# Patient Record
Sex: FEMALE | Race: ASIAN | NOT HISPANIC OR LATINO | Employment: FULL TIME | URBAN - METROPOLITAN AREA
[De-identification: names, ages, dates, MRNs, and addresses within clinical notes are randomized per-mention and may not be internally consistent; named-entity substitution may affect disease eponyms.]

---

## 2017-03-01 ENCOUNTER — EXTERNAL ORDER RESULTS (OUTPATIENT)
Dept: HEALTH INFORMATION MANAGEMENT | Facility: CLINIC | Age: 22
End: 2017-03-01

## 2017-03-01 LAB — PAP SMEAR - HIM PATIENT REPORTED: NEGATIVE

## 2017-04-05 ENCOUNTER — TRANSFERRED RECORDS (OUTPATIENT)
Dept: HEALTH INFORMATION MANAGEMENT | Facility: CLINIC | Age: 22
End: 2017-04-05

## 2017-06-30 ENCOUNTER — TELEPHONE (OUTPATIENT)
Dept: OBGYN | Facility: CLINIC | Age: 22
End: 2017-06-30

## 2017-08-03 ENCOUNTER — TELEPHONE (OUTPATIENT)
Dept: OBGYN | Facility: CLINIC | Age: 22
End: 2017-08-03

## 2017-08-03 ENCOUNTER — HOSPITAL ENCOUNTER (EMERGENCY)
Facility: CLINIC | Age: 22
Discharge: HOME | End: 2017-08-03
Attending: OBSTETRICS & GYNECOLOGY | Admitting: EMERGENCY MEDICINE
Payer: MEDICAID

## 2017-08-03 ENCOUNTER — NURSE TRIAGE (OUTPATIENT)
Dept: NURSING | Facility: CLINIC | Age: 22
End: 2017-08-03

## 2017-08-03 VITALS
HEART RATE: 69 BPM | RESPIRATION RATE: 18 BRPM | DIASTOLIC BLOOD PRESSURE: 75 MMHG | OXYGEN SATURATION: 98 % | WEIGHT: 130 LBS | HEIGHT: 59 IN | SYSTOLIC BLOOD PRESSURE: 118 MMHG | BODY MASS INDEX: 26.21 KG/M2 | TEMPERATURE: 98.6 F

## 2017-08-03 DIAGNOSIS — Z34.80 SUPERVISION OF OTHER NORMAL PREGNANCY, ANTEPARTUM: Primary | ICD-10-CM

## 2017-08-03 DIAGNOSIS — O26.899 PELVIC CRAMPING IN ANTEPARTUM PERIOD: ICD-10-CM

## 2017-08-03 DIAGNOSIS — O26.892 PELVIC PAIN IN ANTEPARTUM PERIOD IN SECOND TRIMESTER: ICD-10-CM

## 2017-08-03 DIAGNOSIS — Z3A.26 26 WEEKS GESTATION OF PREGNANCY: ICD-10-CM

## 2017-08-03 DIAGNOSIS — R10.2 PELVIC PAIN IN ANTEPARTUM PERIOD IN SECOND TRIMESTER: ICD-10-CM

## 2017-08-03 DIAGNOSIS — R07.89 ATYPICAL CHEST PAIN: ICD-10-CM

## 2017-08-03 DIAGNOSIS — R10.2 PELVIC CRAMPING IN ANTEPARTUM PERIOD: ICD-10-CM

## 2017-08-03 LAB
ABO + RH BLD: NORMAL
ABO + RH BLD: NORMAL
ALBUMIN UR-MCNC: NEGATIVE MG/DL
AMPHETAMINES UR QL SCN: NORMAL
APPEARANCE UR: CLEAR
BILIRUB UR QL STRIP: NEGATIVE
BLD GP AB SCN SERPL QL: NORMAL
BLOOD BANK CMNT PATIENT-IMP: NORMAL
CANNABINOIDS UR QL: NORMAL
COCAINE UR QL: NORMAL
COLOR UR AUTO: ABNORMAL
ERYTHROCYTE [DISTWIDTH] IN BLOOD BY AUTOMATED COUNT: 12.6 % (ref 10–15)
FIBRONECTIN FETAL VAG QL: NORMAL
GLUCOSE SERPL-MCNC: 75 MG/DL (ref 70–99)
GLUCOSE UR STRIP-MCNC: NEGATIVE MG/DL
HCT VFR BLD AUTO: 34.7 % (ref 35–47)
HGB BLD-MCNC: 11.5 G/DL (ref 11.7–15.7)
HGB UR QL STRIP: NEGATIVE
HIV1 P24 AG SER QL: NONREACTIVE
HIV1+2 AB SERPL QL IA: NONREACTIVE
KETONES UR STRIP-MCNC: NEGATIVE MG/DL
LEUKOCYTE ESTERASE UR QL STRIP: NEGATIVE
MCH RBC QN AUTO: 30.2 PG (ref 26.5–33)
MCHC RBC AUTO-ENTMCNC: 33.1 G/DL (ref 31.5–36.5)
MCV RBC AUTO: 91 FL (ref 78–100)
MICRO REPORT STATUS: NORMAL
MUCOUS THREADS #/AREA URNS LPF: PRESENT /LPF
NITRATE UR QL: NEGATIVE
OPIATES UR QL SCN: NORMAL
PCP UR QL SCN: NORMAL
PH UR STRIP: 6.5 PH (ref 5–7)
PLATELET # BLD AUTO: 226 10E9/L (ref 150–450)
RAPID HIV INTERNAL CONTROL: NORMAL
RAPID HIV INTERPRETATION: NORMAL
RBC # BLD AUTO: 3.81 10E12/L (ref 3.8–5.2)
RBC #/AREA URNS AUTO: <1 /HPF (ref 0–2)
SP GR UR STRIP: 1.01 (ref 1–1.03)
SPECIMEN EXP DATE BLD: NORMAL
SPECIMEN SOURCE: NORMAL
SQUAMOUS #/AREA URNS AUTO: 4 /HPF (ref 0–1)
URN SPEC COLLECT METH UR: ABNORMAL
UROBILINOGEN UR STRIP-MCNC: NORMAL MG/DL (ref 0–2)
WBC # BLD AUTO: 10.2 10E9/L (ref 4–11)
WBC #/AREA URNS AUTO: <1 /HPF (ref 0–2)
WET PREP SPEC: NORMAL

## 2017-08-03 PROCEDURE — 80307 DRUG TEST PRSMV CHEM ANLYZR: CPT | Performed by: STUDENT IN AN ORGANIZED HEALTH CARE EDUCATION/TRAINING PROGRAM

## 2017-08-03 PROCEDURE — 36415 COLL VENOUS BLD VENIPUNCTURE: CPT | Performed by: STUDENT IN AN ORGANIZED HEALTH CARE EDUCATION/TRAINING PROGRAM

## 2017-08-03 PROCEDURE — 59025 FETAL NON-STRESS TEST: CPT | Mod: 26 | Performed by: OBSTETRICS & GYNECOLOGY

## 2017-08-03 PROCEDURE — 86850 RBC ANTIBODY SCREEN: CPT | Performed by: STUDENT IN AN ORGANIZED HEALTH CARE EDUCATION/TRAINING PROGRAM

## 2017-08-03 PROCEDURE — 86901 BLOOD TYPING SEROLOGIC RH(D): CPT | Performed by: STUDENT IN AN ORGANIZED HEALTH CARE EDUCATION/TRAINING PROGRAM

## 2017-08-03 PROCEDURE — 85027 COMPLETE CBC AUTOMATED: CPT | Performed by: STUDENT IN AN ORGANIZED HEALTH CARE EDUCATION/TRAINING PROGRAM

## 2017-08-03 PROCEDURE — 82947 ASSAY GLUCOSE BLOOD QUANT: CPT | Performed by: STUDENT IN AN ORGANIZED HEALTH CARE EDUCATION/TRAINING PROGRAM

## 2017-08-03 PROCEDURE — 87591 N.GONORRHOEAE DNA AMP PROB: CPT | Performed by: STUDENT IN AN ORGANIZED HEALTH CARE EDUCATION/TRAINING PROGRAM

## 2017-08-03 PROCEDURE — G0499 HEPB SCREEN HIGH RISK INDIV: HCPCS | Performed by: STUDENT IN AN ORGANIZED HEALTH CARE EDUCATION/TRAINING PROGRAM

## 2017-08-03 PROCEDURE — 81001 URINALYSIS AUTO W/SCOPE: CPT | Performed by: STUDENT IN AN ORGANIZED HEALTH CARE EDUCATION/TRAINING PROGRAM

## 2017-08-03 PROCEDURE — 99214 OFFICE O/P EST MOD 30 MIN: CPT | Mod: 27

## 2017-08-03 PROCEDURE — 86900 BLOOD TYPING SEROLOGIC ABO: CPT | Performed by: STUDENT IN AN ORGANIZED HEALTH CARE EDUCATION/TRAINING PROGRAM

## 2017-08-03 PROCEDURE — 82731 ASSAY OF FETAL FIBRONECTIN: CPT | Performed by: STUDENT IN AN ORGANIZED HEALTH CARE EDUCATION/TRAINING PROGRAM

## 2017-08-03 PROCEDURE — 93005 ELECTROCARDIOGRAM TRACING: CPT | Performed by: EMERGENCY MEDICINE

## 2017-08-03 PROCEDURE — 86780 TREPONEMA PALLIDUM: CPT | Performed by: STUDENT IN AN ORGANIZED HEALTH CARE EDUCATION/TRAINING PROGRAM

## 2017-08-03 PROCEDURE — 87806 HIV AG W/HIV1&2 ANTB W/OPTIC: CPT | Performed by: STUDENT IN AN ORGANIZED HEALTH CARE EDUCATION/TRAINING PROGRAM

## 2017-08-03 PROCEDURE — 93010 ELECTROCARDIOGRAM REPORT: CPT | Mod: Z6 | Performed by: EMERGENCY MEDICINE

## 2017-08-03 PROCEDURE — 99284 EMERGENCY DEPT VISIT MOD MDM: CPT | Mod: 25 | Performed by: EMERGENCY MEDICINE

## 2017-08-03 PROCEDURE — 87210 SMEAR WET MOUNT SALINE/INK: CPT | Performed by: STUDENT IN AN ORGANIZED HEALTH CARE EDUCATION/TRAINING PROGRAM

## 2017-08-03 PROCEDURE — 99284 EMERGENCY DEPT VISIT MOD MDM: CPT | Performed by: EMERGENCY MEDICINE

## 2017-08-03 PROCEDURE — 87491 CHLMYD TRACH DNA AMP PROBE: CPT | Performed by: STUDENT IN AN ORGANIZED HEALTH CARE EDUCATION/TRAINING PROGRAM

## 2017-08-03 PROCEDURE — 86762 RUBELLA ANTIBODY: CPT | Performed by: STUDENT IN AN ORGANIZED HEALTH CARE EDUCATION/TRAINING PROGRAM

## 2017-08-03 ASSESSMENT — ENCOUNTER SYMPTOMS
ABDOMINAL PAIN: 1
FEVER: 0

## 2017-08-03 NOTE — IP AVS SNAPSHOT
MRN:[id number]                      After Visit Summary   8/3/2017    Pa Kev Garcia    MRN: [id number]           Thank you!     Thank you for choosing [facility] for your care. Our goal is always to provide you with excellent care. Hearing back from our patients is one way we can continue to improve our services. Please take a few minutes to complete the written survey that you may receive in the mail after you visit with us. Thank you!        Patient Information     Date Of Birth          1995        Designated Caregiver       Most Recent Value    Caregiver    Will someone help with your care after discharge? no      About your hospital stay     You were admitted on:  N/A You last received care in the:  [facility]    You were discharged on:  August 3, 2017       Who to Call     For medical emergencies, please call 911.  For non-urgent questions about your medical care, please call your primary care provider or clinic, None          Attending Provider     Provider Specialty    Mely Farnsworth MD OB/Gyn       Primary Care Provider    Physician No Ref-Primary      Your next 10 appointments already scheduled     Aug 07, 2017  8:15 AM CDT   New Prenatal with RD OB NURSE EDUCATION   [facility]    [address] [place] [id number] [phone]              Further instructions from your care team       Discharge Instructions for Undelivered Patients    Diet:  * Drink 8 to 12 glasses of liquids (milk, juice, water) every day  * You may eat meals and snacks.    Activity:  ** Call your doctor or nurse midwife if your baby is moving less than usual.    Call your provider if you notice:  * Swelling in your face or increased swelling in your hands or legs.  * Headaches that are not relieved by Tylenol (acetaminophen).  * Changes in your vision (blurring; seeing spots or stars).  * Nausea (sick to your stomach) and vomiting  (throwing up).  * Weight gain of 5 pounds per week.  * Heartburn that doesn't go away.  * Signs of bladder infection: Pain when you urinate (use the toilet), needing to go more often or more urgently.  * The bag of ramos (membrane) breaks, or you notice leaking in your underwear.  * Bright red blood in your underwear.  * Abdominal (lower belly) or stomach pain.  * For first baby: Contractions (tightenings) less than 5 minutes apart for one hour or more.  * Second (plus) baby: Contractions (tightenings) less than 10 minutes apart and getting stronger.  * Increase or change in vaginal discharge (note the color and amount).         *CHEST PAIN, UNCERTAIN CAUSE    Based on your exam today, the exact cause of your chest pain is not certain. Your condition does not seem serious at this time, and your pain does not appear to be coming from your heart. However, sometimes the signs of a serious problem take more time to appear. Therefore, watch for the warning signs listed below.  HOME CARE:  1. Rest today and avoid strenuous activity.  2. Take any prescribed medicine as directed.  FOLLOW UP with your doctor in 1-3 days.   GET PROMPT MEDICAL ATTENTION if any of the following occur:    A change in the type of pain: if it feels different, becomes more severe, lasts longer, or begins to spread into your shoulder, arm, neck, jaw or back    Shortness of breath or increased pain with breathing    Weakness, dizziness, or fainting    Cough with blood or dark colored sputum (phlegm)    Fever over 101  F (38.3  C)    Swelling, pain or redness in one leg    6010-5308 [place] [address]. All rights reserved. This information is not intended as a substitute for professional medical care. Always follow your healthcare professional's instructions.      Pending Results     Date and Time Order Name Status Description    8/3/2017 1949 Chlamydia trachomatis PCR In process     8/3/2017 1949 Neisseria  "gonorrhoea PCR In process     8/3/2017 1949 Rubella Antibody IgG Quantitative In process     8/3/2017 1949 Anti Treponema In process     8/3/2017 1949 Hepatitis B surface antigen In process             Statement of Approval     Ordered          17 2156  I have reviewed and agree with all the recommendations and orders detailed in this document.  EFFECTIVE NOW     Approved and electronically signed by:  Mely Farnsworth MD             Admission Information     Date & Time Department Dept. Phone    8/3/2017 [facility] [phone]      Your Vitals Were     Blood Pressure Pulse Temperature Respirations Height Weight    118/75 69 98.6  F (37  C) (Oral) 18 1.499 m (4' 11\") 59 kg (130 lb)    Last Period Pulse Oximetry BMI (Body Mass Index)             2017 98% 26.26 kg/m2         MyChart Information     [employer] lets you send messages to your doctor, view your test results, renew your prescriptions, schedule appointments and more. To sign up, go to [email or web address] . Click on \"Log in\" on the left side of the screen, which will take you to the Welcome page. Then click on \"Sign up Now\" on the right side of the page.     You will be asked to enter the access code listed below, as well as some personal information. Please follow the directions to create your username and password.     Your access code is: [email or web address]  Expires: 2017  9:57 PM     Your access code will  in 90 days. If you need help or a new code, please call your [place] clinic or [phone].        Care EveryWhere ID     This is your Care EveryWhere ID. This could be used by other organizations to access your [place] medical records  [id number]        Equal Access to Services     ROSARIO DUNN : Raymond Samuel, brittany branham, eron kaalmajovan barlow, astrid gibson. So [facility] [phone].    ATENCIÓN: Si habla español, tiene a garg disposición servicios gratuitos de asistencia " lingüísticaCarolina Lowe al [phone].    We comply with applicable federal civil rights laws and [place] laws. We do not discriminate on the basis of race, color, national origin, age, disability sex, sexual orientation or gender identity.               Review of your medicines      START taking        Dose / Directions    Calcium-Magnesium 300-300 MG Tabs   Used for:  Supervision of other normal pregnancy, antepartum        Dose:  1 Dose   Take 1 Dose by mouth 2 times daily   Quantity:  90 tablet   Refills:  3            Where to get your medicines      Some of these will need a paper prescription and others can be bought over the counter. Ask your nurse if you have questions.     Bring a paper prescription for each of these medications     Calcium-Magnesium 300-300 MG Tabs                Protect others around you: Learn how to safely use, store and throw away your medicines at www.disposemymeds.org.             Medication List: This is a list of all your medications and when to take them. Check marks below indicate your daily home schedule. Keep this list as a reference.      Medications           Morning Afternoon Evening Bedtime As Needed    Calcium-Magnesium 300-300 MG Tabs   Take 1 Dose by mouth 2 times daily

## 2017-08-03 NOTE — TELEPHONE ENCOUNTER
Per Dr. Farnsworth, she is unable to see the patient on Monday.  It may be a possibility on Tuesday when she is on call, but she will need to check her schedule to see when it could be accommodated.  Sandra Bonner RN

## 2017-08-03 NOTE — TELEPHONE ENCOUNTER
Pa starting this morning if having cramping on left side of abdomen and is 26 weeks pregnant/due date is 11/6/2017.

## 2017-08-03 NOTE — TELEPHONE ENCOUNTER
"  Reason for Disposition    [1] Intermittent lower abdominal pain AND [2] present > 24 hours    Additional Information    Negative: Followed an abdomen (stomach) injury    Negative: [1] Having contractions or other symptoms of labor AND [2] >= 37 weeks pregnant (i.e., term pregnancy)    Negative: [1] Having contractions or other symptoms of labor AND [2] < 37 weeks pregnant (i.e., )    Negative: [1] Abdominal pain AND [2] pregnant < 20 weeks    Negative: Passed out (i.e., lost consciousness, collapsed and was not responding)    Negative: Shock suspected (e.g., cold/pale/clammy skin, too weak to stand, low BP, rapid pulse)    Negative: Difficult to awaken or acting confused  (e.g., disoriented, slurred speech)    Negative: [1] SEVERE abdominal pain (e.g., excruciating) AND [2] constant AND [3] present > 1 hour    Negative: SEVERE vaginal bleeding (e.g., continuous red blood from vagina, or large blood clots)    Negative: Sounds like a life-threatening emergency to the triager    Negative: [1] Vomiting AND [2] contains red blood or black (\"coffee ground\") material  (Exception: few red streaks in vomit that only happened once)    Negative: MODERATE-SEVERE abdominal pain (e.g., interferes with normal activities, awakens from sleep)    Negative: Vaginal bleeding or spotting    Negative: [1] Baby moving less today (e.g., kick count < 5 in 1 hour or < 10 in 2 hours) AND [2] pregnant 23 or more weeks    Negative: Leakage of fluid from vagina    Negative: New hand or face swelling    Negative: Blurred vision or visual changes    Negative: [1] SEVERE headache AND [2] not relieved with acetaminophen (e.g., Tylenol)    Negative: [1] MILD abdominal pain (e.g., doesn't interfere with normal activities) AND [2] constant AND [3] present > 2 hours    Protocols used: PREGNANCY - ABDOMINAL PAIN GREATER THAN 20 WEEKS EGA-ADULT-  [facility] warm transferred caller through to Clinic.    "

## 2017-08-03 NOTE — LETTER
[facility]  [address]  [facility] [place] [address]  [phone]      August 3, 2017      Kd Garcia  [address] [place] [address]              To Whom It May Concern:    Kd Garcia was seen on labor and delivery tonight for a pregnancy evaluation.  She is unable to work 8/3/17 and 8/4/17.  She may return to work on 8/7/17.         Sincerely,        Mely Farnsworth MD

## 2017-08-03 NOTE — TELEPHONE ENCOUNTER
I received this call From [facility], pt was trying to speak to someone about her situation.    Pt is 26 almost 27 weeks pregnant.  She has not seen any MD during this pregnancy at this point due to insurance.  She has gotten a packet from [employer], asking her to choose a insurance.  She has not turned in but she is gonna choose [facility]    Pt started having contractions since 530 this am.  She is having contractions up to every 5 minutes usually long time in between.    Since has not been seen suggested to go to the Hospital Emergency Room, she hopes to deliver at.  She needs to have a full evaluation now, to see if she is in  labor.    Pt agreed.  Regis MAYFIELD RN

## 2017-08-03 NOTE — TELEPHONE ENCOUNTER
Pt called in stating that she is about 27 weeks pregnant and that due to insurance she has not been seen for this pregnancy yet. The pt states that she has been having cramping since 5 am this morning, sometimes as close together as every 5 mins. Pt denies bleeding or leaking of fluids. Prior to this call the pt called [facility] and they referred her to the ED for evaluation. The pt called our office instead. Pt was scheduled for a New Prenatal with the nurse on Monday August 7th, 2017 and then informed that she should go to the ED for evaluation d/t her ctx/cramping and the fact that she is suppose to work tonight and describes her work as very physical. The pt verbalized understanding and was informed that we would check with SHUBHAM (previous pt of Yue) to see if she could see the pt for a New OB on Monday 08/07/2017 in the afternoon. Please advise if this pt can be added onto Monday's afternoon schedule and what time if so. Myrna Maddox RN

## 2017-08-03 NOTE — IP AVS SNAPSHOT
[facility]    [address]    [place] [address]    Phone:  [phone]                                       After Visit Summary   8/3/2017    Kd Garcia    MRN: [id number]           After Visit Summary Signature Page     I have received my discharge instructions, and my questions have been answered. I have discussed any challenges I see with this plan with the nurse or doctor.    ..........................................................................................................................................  Patient/Patient Representative Signature      ..........................................................................................................................................  Patient Representative Print Name and Relationship to Patient    ..................................................               ................................................  Date                                            Time    ..........................................................................................................................................  Reviewed by Signature/Title    ...................................................              ..............................................  Date                                                            Time

## 2017-08-03 NOTE — ED PROVIDER NOTES
History     Chief Complaint   Patient presents with     Abdominal Pain     26 weeks pregnat. Having abdominal cramping that started this moring with chest pain.     Chest Pain     HPI  Pa Kev Garcia is an otherwise healthy 22 year old 26 week pregnant female who presents today with abdominal pain and chest pain. Patient notes that the chest pain began this morning around 4 AM and notes that she has had 2 previous chest pain episodes in the past week. She notes that when she has these episodes she experiences trouble breathing and it continues for about ~2 hours and then goes away completely. Patient also notes that she has been having menstrual cramps in her abdomen and has not seen an OB GYN because of insurance issues. Patient denies a history of asthma or heart problems. She denies any fevers, leg swelling/pain, and any drug allergies.     I have reviewed the Medications, Allergies, Past Medical and Surgical History, and Social History in the Diurnal system.    Past Medical History:   Diagnosis Date     Papanicolaou smear of cervix with atypical squamous cells of undetermined significance (ASC-US) 5/25/16       Past Surgical History:   Procedure Laterality Date     HC OPEN RX DISTAL RADIUS FX, EXTRA-ARTICULAR  2008    left arm fx       Family History   Problem Relation Age of Onset     Family History Negative Mother      C.A.D. Father 70     MI     Hypertension Father        Social History   Substance Use Topics     Smoking status: Never Smoker     Smokeless tobacco: Never Used     Alcohol use No       No current facility-administered medications for this encounter.      Current Outpatient Prescriptions   Medication     Calcium-Magnesium 300-300 MG TABS        No Known Allergies  Review of Systems   Constitutional: Negative for fever.   Cardiovascular: Positive for chest pain. Negative for leg swelling.   Gastrointestinal: Positive for abdominal pain.   All other systems reviewed and are negative.      Physical  Exam   BP: 124/77  Pulse: 81  Temp: 98.1  F (36.7  C)  Resp: 16  SpO2: 98 %  Physical Exam Exam:  Constitutional: healthy, alert and no distress  Head: Normocephalic. No masses, lesions, tenderness or abnormalities  Neck: Neck supple. No adenopathy. Thyroid symmetric, normal size,, Carotids without bruits.  ENT: ENT exam normal, no neck nodes or sinus tenderness  Cardiovascular: negative, PMI normal. No lifts, heaves, or thrills. RRR. No murmurs, clicks gallops or rub  Respiratory: negative, Percussion normal. Good diaphragmatic excursion. Lungs clear  Gastrointestinal: Abdomen gravid and not tender  : Deferred  Musculoskeletal: extremities normal- no gross deformities noted, gait normal and normal muscle tone  Skin: no suspicious lesions or rashes  Neurologic: Gait normal. Reflexes normal and symmetric. Sensation grossly WNL.  Psychiatric: mentation appears normal and affect normal/bright  Hematologic/Lymphatic/Immunologic: Normal cervical lymph nodes      ED Course     ED Course     Procedures             EKG Interpretation:      Interpreted by Teo Gresham MD  Time reviewed: 18:40  Symptoms at time of EKG: Abdominal pain and chest pain  Rhythm: normal sinus with sinus arrhythmia   Rate: 77 bpm  Axis: normal  Ectopy: none  Conduction: normal  ST Segments/ T Waves: No ST-T wave changes  Q Waves: none  Comparison to prior: No old EKG available     Clinical Impression: Normal sinus rhythm with sinus arrhythmia. Incomplete right bundle branch block.                 Labs Ordered and Resulted from Time of ED Arrival Up to the Time of Departure from the ED - No data to display         Assessments & Plan (with Medical Decision Making)     This is a 22-year-old female who is approximately 26 weeks pregnant with no prenatal care per patient.  Patient states that secondary to not having insurance she is has been unable to get prenatal care.  Last time she had an ultrasound was around 6 weeks of pregnancy and was told at that  time she has intrauterine pregnancy.  For the last few weeks she's been having intermittent chest pain that is related to movement as well as deep breathing.  Patient states that sharp pain lasts for only an hour or 2 and then goes away.  She's had 2 episodes of this.  Today she had some anterior chest pain that was more non-sharp in nature and now is completely resolved.  There is no leg pain or leg swelling with this.  I do not think that this is pulmonary embolism and I do not think this is cardiac in nature.  Patient has an EKG today which does not show any evidence of acute ischemia.  Given her symptoms I think this is atypical chest pain-will need follow-up as an outpatient.    However, she is also having abdominal cramping pain and given the fact that she is 26 weeks, patient will be sent to OB floor for further evaluation and treatment for abdominal cramps.  Patient is sent to OB floor in stable condition.    I have reviewed the nursing notes.    I have reviewed the findings, diagnosis, plan and need for follow up with the patient.    Discharge Medication List as of 8/3/2017  9:57 PM      START taking these medications    Details   Calcium-Magnesium 300-300 MG TABS Take 1 Dose by mouth 2 times daily, Disp-90 tablet, R-3, Local PrintMay substitute a similar calcium magnesium supplement if you do not have this exact one.             Final diagnoses:   Atypical chest pain   Pelvic cramping in antepartum period   IGege, am serving as a trained medical scribe to document services personally performed by Teo Gresham MD, based on the provider's statements to me.    ITeo MD, was physically present and have reviewed and verified the accuracy of this note documented by Gege Aragon.       8/3/2017   [facility], [place], EMERGENCY DEPARTMENT     Teo Gresahm MD  08/06/17 9750

## 2017-08-04 ENCOUNTER — TELEPHONE (OUTPATIENT)
Dept: OBGYN | Facility: CLINIC | Age: 22
End: 2017-08-04

## 2017-08-04 ENCOUNTER — PRE VISIT (OUTPATIENT)
Dept: MATERNAL FETAL MEDICINE | Facility: CLINIC | Age: 22
End: 2017-08-04

## 2017-08-04 DIAGNOSIS — Z34.80 SUPERVISION OF OTHER NORMAL PREGNANCY, ANTEPARTUM: Primary | ICD-10-CM

## 2017-08-04 LAB
C TRACH DNA SPEC QL NAA+PROBE: NORMAL
HBV SURFACE AG SERPL QL IA: NONREACTIVE
INTERPRETATION ECG - MUSE: NORMAL
N GONORRHOEA DNA SPEC QL NAA+PROBE: NORMAL
RUBV IGG SERPL IA-ACNC: 7 IU/ML
SPECIMEN SOURCE: NORMAL
SPECIMEN SOURCE: NORMAL
T PALLIDUM IGG+IGM SER QL: NEGATIVE

## 2017-08-04 NOTE — TELEPHONE ENCOUNTER
Order placed. [facility] appt Wed 8/9 at 3:00pm. Call to patient - patient aware.  Nisreen Levin

## 2017-08-04 NOTE — DISCHARGE INSTRUCTIONS
Discharge Instructions for Undelivered Patients    Diet:  * Drink 8 to 12 glasses of liquids (milk, juice, water) every day  * You may eat meals and snacks.    Activity:  ** Call your doctor or nurse midwife if your baby is moving less than usual.    Call your provider if you notice:  * Swelling in your face or increased swelling in your hands or legs.  * Headaches that are not relieved by Tylenol (acetaminophen).  * Changes in your vision (blurring; seeing spots or stars).  * Nausea (sick to your stomach) and vomiting (throwing up).  * Weight gain of 5 pounds per week.  * Heartburn that doesn't go away.  * Signs of bladder infection: Pain when you urinate (use the toilet), needing to go more often or more urgently.  * The bag of ramos (membrane) breaks, or you notice leaking in your underwear.  * Bright red blood in your underwear.  * Abdominal (lower belly) or stomach pain.  * For first baby: Contractions (tightenings) less than 5 minutes apart for one hour or more.  * Second (plus) baby: Contractions (tightenings) less than 10 minutes apart and getting stronger.  * Increase or change in vaginal discharge (note the color and amount).         *CHEST PAIN, UNCERTAIN CAUSE    Based on your exam today, the exact cause of your chest pain is not certain. Your condition does not seem serious at this time, and your pain does not appear to be coming from your heart. However, sometimes the signs of a serious problem take more time to appear. Therefore, watch for the warning signs listed below.  HOME CARE:  1. Rest today and avoid strenuous activity.  2. Take any prescribed medicine as directed.  FOLLOW UP with your doctor in 1-3 days.   GET PROMPT MEDICAL ATTENTION if any of the following occur:    A change in the type of pain: if it feels different, becomes more severe, lasts longer, or begins to spread into your shoulder, arm, neck, jaw or back    Shortness of breath or increased pain with breathing    Weakness,  dizziness, or fainting    Cough with blood or dark colored sputum (phlegm)    Fever over 101  F (38.3  C)    Swelling, pain or redness in one leg    3585-8491 [employer] [facility] [place], [address] [place] [address], [place] [address]. All rights reserved. This information is not intended as a substitute for professional medical care. Always follow your healthcare professional's instructions.

## 2017-08-04 NOTE — PLAN OF CARE
Pt presents with c/o some cramping and complaint of occasional chest pain, possibly related to heartburn. Pt first presented to ED, where EKG was performed with reassuring results. Pt has had no prenatal care during this pregnancy. Prenatal labs and bedside US performed. Pt encouraged to go to clinic appointment on Monday to establish care. Discharge orders explained and understood. [place] home.

## 2017-08-04 NOTE — PROGRESS NOTES
"[facility]  OB Triage Note    CC: Abdominal cramping     HPI: Ms. Kd Garcia is a 22 year old  at 26w3d by certain LMP, who presents with abdominal cramping. She was transferred from the ED after evaluation for chest pain and was deemed stable. She noticed intermittent abdominal menstrual like cramping starting this morning. She tried laying down and resting, and drinking water, however cramping pain continued to persist. Also has mild back pain, attributed to lifting heavy boxes at work. She denies contractions, leaking fluid, vaginal bleeding.  + Good fetal movement.    She has had no prenatal care in this pregnancy, citing lack of insurance as the reason.      Obstetric Complications  1. No prenatal care     Objective:  Patient Vitals for the past 24 hrs:   BP Temp Temp src Pulse Resp SpO2 Height Weight   17 1913 118/75 98.6  F (37  C) Oral 69 18 - 1.499 m (4' 11\") 59 kg (130 lb)   17 1830 124/77 98.1  F (36.7  C) Oral 81 16 98 % - -     Gen: Well-appearing, NAD  Abd: Soft, gravid, nontender to palpation  Ext: no LE edema b/l    Spec: Mild white vaginal discharge, no blood.   Cervix: Closed and long on digital exam    FHT: , moderate karthikeyan, + accels, no decels, reactive NST  [place]: 0 ctx in 10 mins    Labs:  Results for orders placed or performed during the hospital encounter of 17 (from the past 24 hour(s))   EKG 12 lead   Result Value Ref Range    Interpretation ECG Click View Image link to view waveform and result    ABO/Rh type and screen   Result Value Ref Range    ABO O     RH(D)  Pos     Antibody Screen Neg     Test Valid Only At       [facility]    Specimen Expires 2017    CBC with platelets   Result Value Ref Range    WBC 10.2 4.0 - 11.0 10e9/L    RBC Count 3.81 3.8 - 5.2 10e12/L    Hemoglobin 11.5 (L) 11.7 - 15.7 g/dL    Hematocrit 34.7 (L) 35.0 - 47.0 %    MCV 91 78 - 100 fl    MCH 30.2 26.5 - " 33.0 pg    MCHC 33.1 31.5 - 36.5 g/dL    RDW 12.6 10.0 - 15.0 %    Platelet Count 226 150 - 450 10e9/L   Rapid HIV 1 and 2 Antigen Antibody   Result Value Ref Range    Rapid HIV 1/2 Antibody Nonreactive NR    Rapid HIV 1 p24 Antigen Nonreactive NR    Rapid HIV Interpretation       No HIV-1 or HIV-2 antibodies or HIV-1 p24 antigens were detected.   All rapid HIV 1/2 Ag/Ab tests need to be confirmed by a second non-rapid HIV   1/2 Ag/Ab Combination test. This assay has not been evaluated for    screening, cord blood specimens or individuals less than 12 years of age.      Rapid HIV Internal Control OK    Glucose   Result Value Ref Range    Glucose 75 70 - 99 mg/dL   UA with Microscopic reflex to Culture   Result Value Ref Range    Color Urine Light Yellow     Appearance Urine Clear     Glucose Urine Negative NEG mg/dL    Bilirubin Urine Negative NEG    Ketones Urine Negative NEG mg/dL    Specific Gravity Urine 1.011 1.003 - 1.035    Blood Urine Negative NEG    pH Urine 6.5 5.0 - 7.0 pH    Protein Albumin Urine Negative NEG mg/dL    Urobilinogen mg/dL Normal 0.0 - 2.0 mg/dL    Nitrite Urine Negative NEG    Leukocyte Esterase Urine Negative NEG    Source Unspecified Urine     WBC Urine <1 0 - 2 /HPF    RBC Urine <1 0 - 2 /HPF    Squamous Epithelial /HPF Urine 4 (H) 0 - 1 /HPF    Mucous Urine Present (A) NEG /LPF   Drug abuse scrn 7 UR (/) (RH, SH, UR)   Result Value Ref Range    Amphetamine Qual Urine  NEG     Negative   Cutoff for a negative amphetamine is 500 ng/mL or less.      Cannabinoids Qual Urine  NEG     Negative   Cutoff for a negative cannabinoid is 50 ng/mL or less.      Cocaine Qual Urine  NEG     Negative   Cutoff for a negative cocaine is 300 ng/mL or less.      Opiates Qualitative Urine  NEG     Negative   Cutoff for a negative opiate is 300 ng/mL or less.      Pcp Qual Urine  NEG     Negative   Cutoff for a negative PCP is 25 ng/mL or less.     Wet prep   Result Value Ref Range     Specimen Description Vagina     Wet Prep       No Trichomonas seen  No clue cells seen  No yeast seen  Rare PMNs seen      Micro Report Status FINAL 2017    Fetal fibronectin   Result Value Ref Range    Fetal Fibronectin       Negative  NOTIFED LAZARUS CHATMAN RN 8/3/17 2148 HL  After 24 weeks gestation, a negative fetal fibronectin is reported to be a   predictor of low term risk for  labor in the next 14 days       Assesment/Plan:  Ms. Kd Garcia is a 22 year old  at 26w3d by LMP here for evaluation of  labor.     labor:  - Cervix closed, FFN negative  - Wet prep WNL  - GC/CT pending    No prenatal care:  - Prenatal labs collected today   - Has ob visit on , encourage patient to attend visit  - Needs dating ultrasound. Not completed today.     Chest pain:  - EKG in ED showing normal sinus rhythm with sinus arrhythmia, incomplete right bundle branch block. Per ED, likely atypical chest pain. Recommend follow-up with PCP    -Fetal Well Being   - Category I FHT. Reactive and reassuring      Jesica Naik MD, MHS  Ob/Gyn PGY2  17 10:46 PM    Attending Staff  I have seen and examined the patient separately from the resident.  Patient works 60  Hrs a week from 6 PM to 6 AM every night M-F.   She has only a 30 min break and has to stand and move boxes the entire shift.  The chest pain she was describing to me sounded like musculoskeletal pain across her upper chest.  We discussed that this job is not going to be possible for her to continue as she progresses into the third trimester,lo toward the end of pregnancy.  She will talk to HR and may need a note for work.  For now she felt like the chest pain is better and she does not feel SOB.  Her cervix is closed.  She was discharged home and will take tonight and tomorrow night off work in order to rest.         Mely Farnsworth MD

## 2017-08-04 NOTE — TELEPHONE ENCOUNTER
----- Message from Mely Farnsworth MD sent at 2017  7:25 AM CDT -----  Regarding: pt needs MFM US  Hi ladies,  I saw this patient last night in triage.     at 26w4d with no prenatal care yet.   She has a nurse ob visit for Monday.  Please get her setup for a MFM US as soon as they can fit her in.  Indication is late onset prenatal care.   Thanks,  Dr. MONCADA

## 2017-08-05 ENCOUNTER — HEALTH MAINTENANCE LETTER (OUTPATIENT)
Age: 22
End: 2017-08-05

## 2017-08-07 ENCOUNTER — PRENATAL OFFICE VISIT (OUTPATIENT)
Dept: NURSING | Facility: CLINIC | Age: 22
End: 2017-08-07
Payer: MEDICAID

## 2017-08-07 VITALS
HEART RATE: 69 BPM | TEMPERATURE: 97.8 F | WEIGHT: 141 LBS | DIASTOLIC BLOOD PRESSURE: 66 MMHG | SYSTOLIC BLOOD PRESSURE: 102 MMHG | HEIGHT: 59 IN | BODY MASS INDEX: 28.43 KG/M2

## 2017-08-07 DIAGNOSIS — Z34.90 SUPERVISION OF NORMAL PREGNANCY: Primary | ICD-10-CM

## 2017-08-07 PROBLEM — Z23 NEED FOR TDAP VACCINATION: Status: ACTIVE | Noted: 2017-08-07

## 2017-08-07 PROBLEM — O09.30 LATE PRENATAL CARE: Status: ACTIVE | Noted: 2017-08-07

## 2017-08-07 LAB
ALBUMIN UR-MCNC: NEGATIVE MG/DL
APPEARANCE UR: CLEAR
BILIRUB UR QL STRIP: NEGATIVE
COLOR UR AUTO: YELLOW
GLUCOSE UR STRIP-MCNC: NEGATIVE MG/DL
HGB UR QL STRIP: NEGATIVE
KETONES UR STRIP-MCNC: NEGATIVE MG/DL
LEUKOCYTE ESTERASE UR QL STRIP: NEGATIVE
NITRATE UR QL: NEGATIVE
PH UR STRIP: 6.5 PH (ref 5–7)
SP GR UR STRIP: 1.01 (ref 1–1.03)
URN SPEC COLLECT METH UR: NORMAL
UROBILINOGEN UR STRIP-ACNC: 0.2 EU/DL (ref 0.2–1)

## 2017-08-07 PROCEDURE — 99207 ZZC NO CHARGE NURSE ONLY: CPT

## 2017-08-07 PROCEDURE — 83021 HEMOGLOBIN CHROMOTOGRAPHY: CPT | Mod: 90 | Performed by: OBSTETRICS & GYNECOLOGY

## 2017-08-07 PROCEDURE — 99000 SPECIMEN HANDLING OFFICE-LAB: CPT | Performed by: OBSTETRICS & GYNECOLOGY

## 2017-08-07 PROCEDURE — 81003 URINALYSIS AUTO W/O SCOPE: CPT | Performed by: OBSTETRICS & GYNECOLOGY

## 2017-08-07 PROCEDURE — 36415 COLL VENOUS BLD VENIPUNCTURE: CPT | Performed by: OBSTETRICS & GYNECOLOGY

## 2017-08-07 RX ORDER — PRENATAL VIT/IRON FUM/FOLIC AC 27MG-0.8MG
1 TABLET ORAL DAILY
Qty: 100 TABLET | Refills: 3 | Status: SHIPPED | OUTPATIENT
Start: 2017-08-07 | End: 2019-06-17

## 2017-08-07 NOTE — MR AVS SNAPSHOT
After Visit Summary   8/7/2017    Kd Angulo Jose    MRN: [id number]           Patient Information     Date Of Birth          1995        Visit Information        Provider Department      8/7/2017 8:15 AM RD OB NURSE EDUCATION [facility]        Today's Diagnoses     Supervision of normal pregnancy    -  1       Follow-ups after your visit        Your next 10 appointments already scheduled     Aug 08, 2017  3:00 PM CDT   New Prenatal with Mely Farnsworth MD   [facility]    [address] [place] [id number] [phone]            Aug 09, 2017  3:00 PM CDT   MFM US COMP with [id number]   [facility])    [address]  [facility] [address] [phone]           Wear comfortable clothes and leave your valuables at home.            Aug 09, 2017  3:30 PM CDT   Radiology MD with EDGARDO HOLMAN MD   [facility])    [address]  [facility] [address] [phone]           Please arrive at the time given for your first appointment. This visit is used internally to schedule the physician's time during your ultrasound.              Who to contact     If you have questions or need follow up information about today's clinic visit or your schedule please contact [facility] directly at [phone].  Normal or non-critical lab and imaging results will be communicated to you by MyChart, letter or phone within 4 business days after the clinic has received the results. If you do not hear from us within 7 days, please contact the clinic through Zigmohart or phone. If you have a critical or abnormal lab result, we will notify you by phone as soon as possible.  Submit refill requests through idiag or call your pharmacy and  "they will forward the refill request to us. Please allow 3 business days for your refill to be completed.          Additional Information About Your Visit        Small Demonshart Information     [employer] gives you secure access to your electronic health record. If you see a primary care provider, you can also send messages to your care team and make appointments. If you have questions, please call your primary care clinic.  If you do not have a primary care provider, please call [phone] and they will assist you.        Care EveryWhere ID     This is your Care EveryWhere ID. This could be used by other organizations to access your [facility] medical records  [id number]        Your Vitals Were     Pulse Temperature Height Last Period BMI (Body Mass Index)       69 97.8  F (36.6  C) 4' 11\" (1.499 m) 01/21/2017 28.48 kg/m2        Blood Pressure from Last 3 Encounters:   08/07/17 102/66   08/03/17 118/75   10/25/16 119/80    Weight from Last 3 Encounters:   08/07/17 141 lb (64 kg)   08/03/17 130 lb (59 kg)   10/25/16 129 lb 11.2 oz (58.8 kg)              We Performed the Following     HGB Eval Reflex to ELP or RBC Solubility     UA without Microscopic        Primary Care Provider    Physician No Ref-Primary       No address on file        Equal Access to Services     DUONG DUNN : Hadii stephanie howardo Sorenettaali, waaxda luqadaha, qaybta kaalmada adeegyada, waxay jm gibson. So [facility] [phone].    ATENCIÓN: Si habla español, tiene a garg disposición servicios gratuitos de asistencia lingüística. Llame al [phone].    We comply with applicable federal civil rights laws and [place] laws. We do not discriminate on the basis of race, color, national origin, age, disability sex, sexual orientation or gender identity.            Thank you!     Thank you for choosing [facility]  for your care. Our goal is always to provide you with excellent care. Hearing back from our patients is one way " we can continue to improve our services. Please take a few minutes to complete the written survey that you may receive in the mail after your visit with us. Thank you!             Your Updated Medication List - Protect others around you: Learn how to safely use, store and throw away your medicines at www.disposemymeds.org.          This list is accurate as of: 8/7/17  9:05 AM.  Always use your most recent med list.                   Brand Name Dispense Instructions for use Diagnosis    Calcium-Magnesium 300-300 MG Tabs     90 tablet    Take 1 Dose by mouth 2 times daily    Supervision of other normal pregnancy, antepartum

## 2017-08-07 NOTE — NURSING NOTE
"No chief complaint on file.      Initial /66  Pulse 69  Temp 97.8  F (36.6  C)  Ht 4' 11\" (1.499 m)  Wt 141 lb (64 kg)  LMP 01/21/2017  BMI 28.48 kg/m2 Estimated body mass index is 28.48 kg/(m^2) as calculated from the following:    Height as of this encounter: 4' 11\" (1.499 m).    Weight as of this encounter: 141 lb (64 kg).  Medication Reconciliation: complete    "

## 2017-08-07 NOTE — PROGRESS NOTES
Patient presents for new ob teaching and labs done, fifth pregnancy, late care. Patient was seen in the ED 8/03/17 due to cramping, Dr Farnsworth see her in the ED. Ultrasound scheduled with CHUYITA 8/09/17. Handouts reviewed and given. Patient has appointment with Dr Farnsworth 8/08/17 (call day per Dr PAREDES)      Prenatal OB Questionnaire  Past Medical History  Diabetes   No  Hypertension   No  Heart Disease, mitral valve prolapse, or rheumatic fever?   No  An autoimmune disorder such as Lupus or Rheumatoid Arthritis?   No  Kidney Disease or Urinary Tract Infection?   No  Epilepsy, seizures or spells?   No  Migraine headaches?   No  A stroke or loss of function or sensation?   No  Any other neurological problems?   No  Have you ever been treated for depression?  No  Are you having problems with crying spells or loss of self-esteem?   No  Have you ever required psychiatric care?   No  Have you ever hepatitis, liver disease or jaundice?   No  Have you ever been treated for blood clots in your veins, deep venous thrombosis, inflammation in the veins, thrombosis, phlebitis, pulmonary embolism or varicosities?   No  Have you had excessive bleeding after surgery or dental work?   No  Do you bleed more than other women after a cut or scratch?   No  Do you have a history of anemia?   No  Have you ever been treated for thyroid problems or taken thyroid medication?  No  Do you have any other endocrine problems?  No  Have you ever been in a major accident or suffered serious trauma?   No  Within the last year, has anyone hit slapped, kicked or otherwise hurt you?  No  In the last year, has anyone forced you to have sex when you didn't want to?  No  Have you ever had a blood transfusion?   No  Would you refuse a blood transfusion if a doctor judged it to be medically necessary?   No  If you answered yes, would you rather die than have a blood transfusion?   No  If you answered yes, is this for Protestant reasons?   No  Does anyone in  your home smoke?   No  Do you use tobacco products?  No  Do you drink beer, wine, hard liquor?  No  Do you use any of the following: marijuana, speed, cocaine, heroine, hallucinogens, or other drugs?  No  Is your blood type Rh negative?   No  Have you ever had abnormal antibodies in your blood?   No  Have you ever had asthma?   No  Have you ever had tuberculosis?   No  Do you have any allergies to drugs or over-the-counter medications?   No    Allergies as of 8/7/2017:    Allergies as of 08/07/2017     (No Known Allergies)       Do you have any breast problems?   No  Have you ever ?  Yes  Have you had any gynecological surgical procedures such as cervical conization, a LEEP procedure, laser treatment, cryosurgery of the cervix, or a dilation and curettage, etc?  No  Have you had any other surgical procedures?  Yes  Have you been hospitalized for a nonsurgical reason excluding normal delivery?   No  Have you ever had any anesthetic complications?   No  Have you ever had an abnormal pap smear?   No  Do you have a history of abnormalities of the uterus?   No  Did it take you more than one year to become pregnant?   No  Have you ever been evaluated or treated for infertility?   No  Is there a history of medical problems in your family, which you feel might adversely affect your health or pregnancy?   No  Do you have any other problems we have not asked you about which you feel may be important to this pregnancy?  No    Symptoms since Last Menstrual Period  Do you have any of the following:    *abdominal pain  No  *blood in stool or urine  No  *chest pain  No  *shortness of breath  Yes  *coughing or vomiting up blood No  *heart racing or skipping beats  No  *nausea and vomiting  No  *pain with urination  No  *vaginal discharge or bleeding  No  Current medications are:  Current Outpatient Prescriptions   Medication Sig Dispense Refill     Calcium-Magnesium 300-300 MG TABS Take 1 Dose by mouth 2 times daily 90  tablet 3       Genetic Screening  At the time of birth, will you be 35 years old or older?  No  Has the patient, baby s father, or anyone in either family had:  Thalassemia (Italian, Greek, Mediterranean, or  background only) and an MCV result less than 80?  No  Neural tube defect such as meningomyelocele, spina bifida or anencephaly?  No  Congenital heart defect?  No  Down s syndrome?  No  Ham-Sach s disease (Buddhism, Cajun, Bengali-Somali)?  No  Sickle cell disease or trait ([place])?  No  Hemophilia or other inherited problems of blood coagulation? No  Muscular dystrophy?  No  Cystic Fibrosis?  No  McCook s chorea?  No  Mental retardation/autism? No   If yes, was the person tested for fragile X?  No  Any other inherited genetic or chromosomal disorder?  No  Maternal metabolic disorder (e.g. insulin-dependent diabetes, PKU)? No  A child with birth defects not listed above?  No  Recurrent pregnancy loss or a stillbirth?  No  Has the patient had any medications/street drugs/alcohol since her last menstrual period? No  Does the patient or baby s father have any other genetic risks?  No  Infection History  Do you object to being tested for Hepatitis B? No  Do you object to being tested for HIV? No  Do you feel that you are at high risk for coming in contact with the AIDS virus?  No  Have you ever been treated for tuberculosis?  No  Have you ever received the BCG vaccine for tuberculosis?  No  Have you ever had a positive skin test for tuberculosis? No  Do you live with someone who has tuberculosis?  No  Have you ever been exposed to tuberculosis?  No  Do you have genital herpes?  No  Does your partner have genital herpes?  No  Have you had a rash or viral illness since your last period?  No  Have you ever had Gonorrhea, Chlamydia, Syphilis, venereal warts, trichomoniasis, pelvic inflammatory disease or any other sexually transmitted disease?  No  Do you know if you are a genital group B streptococcus carrier?  No  You have not had chicken pox/varicella  Yes  Have you been vaccinated against chicken pox?  No  Have you had any other infectious disease? No        Early ultrasound screening tool:    Does patient have irregular periods?  No  Did patient use hormonal birth control in the three months prior to positive urine pregnancy test? No  Is the patient breastfeeding?  No  Is the patient 10 weeks or greater at time of education visit?  Yes

## 2017-08-08 ENCOUNTER — PRENATAL OFFICE VISIT (OUTPATIENT)
Dept: OBGYN | Facility: CLINIC | Age: 22
End: 2017-08-08
Payer: MEDICAID

## 2017-08-08 VITALS
HEIGHT: 59 IN | WEIGHT: 143 LBS | DIASTOLIC BLOOD PRESSURE: 69 MMHG | HEART RATE: 78 BPM | SYSTOLIC BLOOD PRESSURE: 96 MMHG | BODY MASS INDEX: 28.83 KG/M2 | OXYGEN SATURATION: 95 %

## 2017-08-08 DIAGNOSIS — Z78.9 NOT IMMUNE TO RUBELLA: ICD-10-CM

## 2017-08-08 DIAGNOSIS — O09.622 HIGH-RISK PREGNANCY, YOUNG MULTIGRAVIDA, SECOND TRIMESTER: Primary | ICD-10-CM

## 2017-08-08 DIAGNOSIS — R87.610 PAPANICOLAOU SMEAR OF CERVIX WITH ATYPICAL SQUAMOUS CELLS OF UNDETERMINED SIGNIFICANCE (ASC-US): ICD-10-CM

## 2017-08-08 DIAGNOSIS — O09.30 LATE PRENATAL CARE: ICD-10-CM

## 2017-08-08 LAB
HGB A1 MFR BLD: 96.9 %
HGB A2 MFR BLD: 2.7 %
HGB C MFR BLD: 0 %
HGB E MFR BLD: 0 %
HGB F MFR BLD: 0.4 %
HGB FRACT BLD ELPH-IMP: NORMAL
HGB OTHER MFR BLD: 0 %
HGB S BLD QL SOLY: NORMAL
HGB S MFR BLD: 0 %
PATH INTERP BLD-IMP: NORMAL

## 2017-08-08 PROCEDURE — 99214 OFFICE O/P EST MOD 30 MIN: CPT | Performed by: OBSTETRICS & GYNECOLOGY

## 2017-08-08 ASSESSMENT — PATIENT HEALTH QUESTIONNAIRE - PHQ9: SUM OF ALL RESPONSES TO PHQ QUESTIONS 1-9: 1

## 2017-08-08 NOTE — NURSING NOTE
Per Dr. Farnsworth, pt should be working 6 hour shifts in the third trimester.  Eveline will be faxing paperwork to be completed.  If she cannot work 6 hour shifts, pt should go on short term disability.  Sandra Bonner RN

## 2017-08-08 NOTE — MR AVS SNAPSHOT
After Visit Summary   8/8/2017    Kd Schultzleonor Garcia    MRN: [id number]           Patient Information     Date Of Birth          1995        Visit Information        Provider Department      8/8/2017 3:00 PM Mely Farnsworth MD [facility]        Today's Diagnoses     High-risk pregnancy, young multigravida, second trimester    -  1    Late prenatal care        Not immune to rubella        Papanicolaou smear of cervix with atypical squamous cells of undetermined significance (ASC-US)           Follow-ups after your visit        Your next 10 appointments already scheduled     Aug 09, 2017  1:00 PM CDT   LAB with RD LAB   [facility] ([facility])    [address] [place] [address] [address]  [facility] [address] [phone]           Patient must bring picture ID. Patient should be prepared to give a urine specimen  Please do not eat 10-12 hours before your appointment if you are coming in fasting for labs on lipids, cholesterol, or glucose (sugar). Pregnant women should follow their Care Team instructions. Water with medications is okay. Do not drink coffee or other fluids. If you have concerns about taking  your medications, please ask at office or if scheduling via Playrcart, send a message by clicking on Secure Messaging, Message Your Care Team.            Aug 09, 2017  3:00 PM CDT   MANDA US COMP with [id number]   [facility])    [address]  [facility] [id number] [phone]           Wear comfortable clothes and leave your valuables at home.            Aug 09, 2017  3:30 PM CDT   Radiology MD with UR MANDA BARKER   [facility])    [address]  [facility] [address] [phone]           Please arrive at the time given for your first appointment. This visit is  "used internally to schedule the physician's time during your ultrasound.              Who to contact     If you have questions or need follow up information about today's clinic visit or your schedule please contact [facility] directly at [phone].  Normal or non-critical lab and imaging results will be communicated to you by BombBombhart, letter or phone within 4 business days after the clinic has received the results. If you do not hear from us within 7 days, please contact the clinic through BombBombhart or phone. If you have a critical or abnormal lab result, we will notify you by phone as soon as possible.  Submit refill requests through Amazon or call your pharmacy and they will forward the refill request to us. Please allow 3 business days for your refill to be completed.          Additional Information About Your Visit        BombBombharTradier Information     [employer] gives you secure access to your electronic health record. If you see a primary care provider, you can also send messages to your care team and make appointments. If you have questions, please call your primary care clinic.  If you do not have a primary care provider, please call [phone] and they will assist you.        Care EveryWhere ID     This is your Care EveryWhere ID. This could be used by other organizations to access your [facility] medical records  [id number]        Your Vitals Were     Pulse Height Last Period Pulse Oximetry Breastfeeding? BMI (Body Mass Index)    78 4' 11\" (1.499 m) 01/17/2017 95% No 28.88 kg/m2       Blood Pressure from Last 3 Encounters:   08/08/17 96/69   08/07/17 102/66   08/03/17 118/75    Weight from Last 3 Encounters:   08/08/17 143 lb (64.9 kg)   08/07/17 141 lb (64 kg)   08/03/17 130 lb (59 kg)              Today, you had the following     No orders found for display       Primary Care Provider    Physician No Ref-Primary       No address on file        Equal Access to Services     DUONG JARA: Hadii " stephanie Samuel, wanghia pastranaioanaha, qamartinta karoma veronicaart, waxwill jm tenaandrearoberto bosch paige. So [facility] [phone].    ATENCIÓN: Si habla español, tiene a garg disposición servicios gratuitos de asistencia lingüística. Llame al [phone].    We comply with applicable federal civil rights laws and [place] laws. We do not discriminate on the basis of race, color, national origin, age, disability sex, sexual orientation or gender identity.            Thank you!     Thank you for choosing [facility]  for your care. Our goal is always to provide you with excellent care. Hearing back from our patients is one way we can continue to improve our services. Please take a few minutes to complete the written survey that you may receive in the mail after your visit with us. Thank you!             Your Updated Medication List - Protect others around you: Learn how to safely use, store and throw away your medicines at www.disposemymeds.org.          This list is accurate as of: 8/8/17 11:59 PM.  Always use your most recent med list.                   Brand Name Dispense Instructions for use Diagnosis    Calcium-Magnesium 300-300 MG Tabs     90 tablet    Take 1 Dose by mouth 2 times daily    Supervision of other normal pregnancy, antepartum       prenatal multivitamin plus iron 27-0.8 MG Tabs per tablet     100 tablet    Take 1 tablet by mouth daily    Supervision of normal pregnancy

## 2017-08-09 ENCOUNTER — OFFICE VISIT (OUTPATIENT)
Dept: MATERNAL FETAL MEDICINE | Facility: CLINIC | Age: 22
End: 2017-08-09
Attending: OBSTETRICS & GYNECOLOGY
Payer: MEDICAID

## 2017-08-09 ENCOUNTER — HOSPITAL ENCOUNTER (OUTPATIENT)
Dept: ULTRASOUND IMAGING | Facility: CLINIC | Age: 22
Discharge: HOME | End: 2017-08-09
Attending: OBSTETRICS & GYNECOLOGY | Admitting: OBSTETRICS & GYNECOLOGY
Payer: MEDICAID

## 2017-08-09 DIAGNOSIS — O26.90 PREGNANCY RELATED CONDITION, UNSPECIFIED TRIMESTER: ICD-10-CM

## 2017-08-09 DIAGNOSIS — O09.32 LATE PRENATAL CARE IN SECOND TRIMESTER: ICD-10-CM

## 2017-08-09 DIAGNOSIS — Z36.89 ENCOUNTER FOR FETAL ANATOMIC SURVEY: Primary | ICD-10-CM

## 2017-08-09 LAB
GLUCOSE 1H P 50 G GLC PO SERPL-MCNC: 119 MG/DL (ref 60–129)
HGB BLD-MCNC: 10.8 G/DL (ref 11.7–15.7)

## 2017-08-09 PROCEDURE — 86780 TREPONEMA PALLIDUM: CPT | Performed by: OBSTETRICS & GYNECOLOGY

## 2017-08-09 PROCEDURE — 36415 COLL VENOUS BLD VENIPUNCTURE: CPT | Performed by: OBSTETRICS & GYNECOLOGY

## 2017-08-09 PROCEDURE — 76811 OB US DETAILED SNGL FETUS: CPT

## 2017-08-09 PROCEDURE — 00000218 ZZHCL STATISTIC OBHBG - HEMOGLOBIN: Performed by: OBSTETRICS & GYNECOLOGY

## 2017-08-09 PROCEDURE — 82950 GLUCOSE TEST: CPT | Performed by: OBSTETRICS & GYNECOLOGY

## 2017-08-09 NOTE — PROGRESS NOTES
"Please see \"Imaging\" tab under \"Chart Review\" for details of today's US.    Emma Newsome, DO    "

## 2017-08-09 NOTE — MR AVS SNAPSHOT
After Visit Summary   8/9/2017    Pa Kev Garcia    MRN: [id number]           Patient Information     Date Of Birth          1995        Visit Information        Provider Department      8/9/2017 3:30 PM Emma Newsome DO [facility] Maternal Fetal Medicine [facility]        Today's Diagnoses     Encounter for fetal anatomic survey    -  1       Follow-ups after your visit        Your next 10 appointments already scheduled     Aug 30, 2017  8:45 AM CDT   MFM US COMPRE SINGLE F/U with [id number]   [facility] Maternal Fetal Medicine Ultrasound - [facility])    [address]  [facility] [address] [phone]           Wear comfortable clothes and leave your valuables at home.            Aug 30, 2017  9:15 AM CDT   Radiology MD with UR MFM MD   [facility] Maternal Fetal Medicine - [facility])    [address]  [facility] [address]   [phone]           Please arrive at the time given for your first appointment. This visit is used internally to schedule the physician's time during your ultrasound.              Future tests that were ordered for you today     Open Future Orders        Priority Expected Expires Ordered    MFM US Comprehensive Single F/U Routine  8/9/2018 8/9/2017            Who to contact     If you have questions or need follow up information about today's clinic visit or your schedule please contact [facility] MATERNAL FETAL MEDICINE [facility] directly at [phone].  Normal or non-critical lab and imaging results will be communicated to you by MyChart, letter or phone within 4 business days after the clinic has received the results. If you do not hear from us within 7 days, please contact the clinic through TheLaddershart or phone. If you have a critical or abnormal lab result, we will notify you by phone as soon as possible.  Submit refill requests through 140Firet or call  your pharmacy and they will forward the refill request to us. Please allow 3 business days for your refill to be completed.          Additional Information About Your Visit        Make YES! Happenhart Information     Acceleforcet gives you secure access to your electronic health record. If you see a primary care provider, you can also send messages to your care team and make appointments. If you have questions, please call your primary care clinic.  If you do not have a primary care provider, please call [phone] and they will assist you.        Care EveryWhere ID     This is your Care EveryWhere ID. This could be used by other organizations to access your [facility] medical records  [id number]        Your Vitals Were     Last Period                   01/17/2017            Blood Pressure from Last 3 Encounters:   08/08/17 96/69   08/07/17 102/66   08/03/17 118/75    Weight from Last 3 Encounters:   08/08/17 64.9 kg (143 lb)   08/07/17 64 kg (141 lb)   08/03/17 59 kg (130 lb)               Primary Care Provider    Physician No Ref-Primary       No address on file        Equal Access to Services     DUONG DUNN : Hadii stephanie encarnacion hadasho Soomaali, waaxda luqadaha, qaybta kaalmada adeegyajovan, astrid bosch . So [facility] [phone].    ATENCIÓN: Si habla español, tiene a garg disposición servicios gratuitos de asistencia lingüística. Llame al [phone].    We comply with applicable federal civil rights laws and [place] laws. We do not discriminate on the basis of race, color, national origin, age, disability sex, sexual orientation or gender identity.            Thank you!     Thank you for choosing [facility]  for your care. Our goal is always to provide you with excellent care. Hearing back from our patients is one way we can continue to improve our services. Please take a few minutes to complete the written survey that you may receive in the mail after your visit with us.  Thank you!             Your Updated Medication List - Protect others around you: Learn how to safely use, store and throw away your medicines at www.disposemymeds.org.          This list is accurate as of: 8/9/17  4:21 PM.  Always use your most recent med list.                   Brand Name Dispense Instructions for use Diagnosis    Calcium-Magnesium 300-300 MG Tabs     90 tablet    Take 1 Dose by mouth 2 times daily    Supervision of other normal pregnancy, antepartum       prenatal multivitamin plus iron 27-0.8 MG Tabs per tablet     100 tablet    Take 1 tablet by mouth daily    Supervision of normal pregnancy

## 2017-08-09 NOTE — PROGRESS NOTES
SUBJECTIVE:   Kd Whiteheadong is a  22 year old female  at 29w0d by reported EDC which is not c/w her LMP  who presents for a new Ob visit.  She did not have insurance coverage so was seen twice in her first trimester at different clinics for US.    First was done at 6 wks at [facility] and the second US was done at 11 wks at [facility].    No records of either US are available today.   Patient feels fetal movement.  Was seen last week on labor and delivery for cramping and in the ED for chest pain, both of which have resolved.   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  She works M-F 6 PM to 6 AM shift.  Cannot sit at all. Not sure she can continue with this kind of schedule at 60 hrs a week.        POBHx:  Obstetric History       T1      L1     SAB1   TAB0   Ectopic0   Multiple0   Live Births1       # Outcome Date GA Lbr Josh/2nd Weight Sex Delivery Anes PTL Lv   5 Current            4 2016     SAB      3 2016     SAB      2 TAB 2015     TAB      1 Term 12 38w4d 05:42 / 00:09 6 lb 7 oz (2.92 kg) M Vag-Spont Local N MARGARET      Name: JUSTIN DENISE PA      Apgar1:  9                Apgar5: 9            Patient Active Problem List   Diagnosis     CARDIOVASCULAR SCREENING; LDL GOAL LESS THAN 160     Not immune to rubella     Papanicolaou smear of cervix with atypical squamous cells of undetermined significance (ASC-US)     Need for Tdap vaccination     Late prenatal care       Past Medical History:   Diagnosis Date     Papanicolaou smear of cervix with atypical squamous cells of undetermined significance (ASC-US) 16       Past Surgical History:   Procedure Laterality Date     HC OPEN RX DISTAL RADIUS FX, EXTRA-ARTICULAR      left arm fx        Current Outpatient Prescriptions   Medication     Prenatal Vit-Fe Fumarate-FA (PRENATAL MULTIVITAMIN PLUS IRON) 27-0.8 MG TABS per tablet     Calcium-Magnesium 300-300 MG TABS     No current  "facility-administered medications for this visit.        No Known Allergies      EXAM:  BP 96/69 (BP Location: Left arm, Patient Position: Sitting, Cuff Size: Adult Regular)  Pulse 78  Ht 4' 11\" (1.499 m)  Wt 143 lb (64.9 kg)  LMP 01/17/2017  SpO2 95%  Breastfeeding? No  BMI 28.88 kg/m2  GENERAL: WDWN F in NAD  HEENT: no abnormalities, faces slightly swollen  NECK: without thyromegaly or adenopathy  CHEST: clear to auscultation  CV: RRR without murmur  BREASTS: no palpable masses or adenopathy, no asymmetry or skin dimpling  ABDOMEN: S, NT, no palpable masses or hepatosplenomegaly  MUSCULOSKELETAL: no obvious abnormalities.  NEUROLOGICAL: normal strength, sensation, mental status  PSYCH: normal affect, appropriate  PELVIC: EG - normal adult female.  BUS - within normal limits.  Vagina - well rugated, no discharge.  Cervix - no lesions, no CMT.   Uterus - FH 30 cm.  Adnexae - no masses or tenderness.  RV - deferred.    FHT's present with doptone      ASSESSMENT/ PLAN:  IUP @ 29w1d     Encounter Diagnoses   Name Primary?     High-risk pregnancy, young multigravida, second trimester Yes     Late prenatal care      Not immune to rubella      Papanicolaou smear of cervix with atypical squamous cells of undetermined significance (ASC-US)       Labs reviewed today.  Will need MMR after delivery.   Discussed routine prenatal care and patient has an M US tomorrow for survey.   She will come back tomorrow for her GCT as well.      Patient was counselled on healthy lifestyle habits and all questions answered.    Will attempt to get early US reports for better dating.  ROS signed today.     Return to Clinic in 2-3 weeks or sooner if problems arise.    discussed her job and her limitations now that she is third trimester.  discussed that she will need to cut back hours to ~ 6 hrs a day since this is a very physical job with lifting and moving boxes and on her feet for nearly 12 hours straight at 60 hrs a week.  She will " talk with HR dept at work.     Mely Farnsworth MD

## 2017-08-10 LAB — T PALLIDUM IGG+IGM SER QL: NEGATIVE

## 2017-09-01 ENCOUNTER — HOSPITAL ENCOUNTER (OUTPATIENT)
Dept: ULTRASOUND IMAGING | Facility: CLINIC | Age: 22
Discharge: HOME | End: 2017-09-01
Attending: OBSTETRICS & GYNECOLOGY | Admitting: OBSTETRICS & GYNECOLOGY
Payer: COMMERCIAL

## 2017-09-01 ENCOUNTER — OFFICE VISIT (OUTPATIENT)
Dept: MATERNAL FETAL MEDICINE | Facility: CLINIC | Age: 22
End: 2017-09-01
Attending: OBSTETRICS & GYNECOLOGY
Payer: COMMERCIAL

## 2017-09-01 DIAGNOSIS — Z36.89 ENCOUNTER FOR FETAL ANATOMIC SURVEY: ICD-10-CM

## 2017-09-01 DIAGNOSIS — O35.9XX0 SUSPECTED FETAL ANOMALY, ANTEPARTUM, NOT APPLICABLE OR UNSPECIFIED FETUS: Primary | ICD-10-CM

## 2017-09-01 PROCEDURE — 76816 OB US FOLLOW-UP PER FETUS: CPT

## 2017-09-01 NOTE — MR AVS SNAPSHOT
After Visit Summary   9/1/2017    Kd Schultzleonor Garcia    MRN: [id number]           Patient Information     Date Of Birth          1995        Visit Information        Provider Department      9/1/2017 11:15 AM Izzy Rodriguez MD [facility] Maternal Fetal Medicine [facility]        Today's Diagnoses     Suspected fetal anomaly, antepartum, not applicable or unspecified fetus    -  1       Follow-ups after your visit        Your next 10 appointments already scheduled     Sep 18, 2017  5:15 PM CDT   ESTABLISHED PRENATAL with Mely Farnsworth MD   [facility]    [address]  [address]  [facility] [address]   [phone]            Sep 28, 2017  8:00 AM CDT   MFM US COMPRE SINGLE F/U with [id number]   [facility] Maternal Fetal Medicine Ultrasound - [place] [facility])    [address]  [facility] [id number] [phone]           Wear comfortable clothes and leave your valuables at home.            Sep 28, 2017  8:30 AM CDT   Radiology MD with UR MANDA BARKER   [facility] Maternal Fetal Medicine - [facility])    [address]  [facility] [id number] [phone]           Please arrive at the time given for your first appointment. This visit is used internally to schedule the physician's time during your ultrasound.              Future tests that were ordered for you today     Open Future Orders        Priority Expected Expires Ordered    MFM US Comprehensive Single F/U Routine 9/29/2017 9/1/2018 9/1/2017            Who to contact     If you have questions or need follow up information about today's clinic visit or your schedule please contact [facility] MATERNAL FETAL MEDICINE [facility] directly at [phone].  Normal or non-critical lab and imaging results will be communicated to you by MyChart, letter or phone within 4 business days after the  clinic has received the results. If you do not hear from us within 7 days, please contact the clinic through Mediafly or phone. If you have a critical or abnormal lab result, we will notify you by phone as soon as possible.  Submit refill requests through Mediafly or call your pharmacy and they will forward the refill request to us. Please allow 3 business days for your refill to be completed.          Additional Information About Your Visit        RevokomharSAVO Information     Mediafly gives you secure access to your electronic health record. If you see a primary care provider, you can also send messages to your care team and make appointments. If you have questions, please call your primary care clinic.  If you do not have a primary care provider, please call [phone] and they will assist you.        Care EveryWhere ID     This is your Care EveryWhere ID. This could be used by other organizations to access your [facility] medical records  [id number]        Your Vitals Were     Last Period                   01/17/2017            Blood Pressure from Last 3 Encounters:   08/08/17 96/69   08/07/17 102/66   08/03/17 118/75    Weight from Last 3 Encounters:   08/08/17 64.9 kg (143 lb)   08/07/17 64 kg (141 lb)   08/03/17 59 kg (130 lb)               Primary Care Provider    Physician No Ref-Primary       No address on file        Equal Access to Services     DUONG DUNN : Hadii stephanie howardo Soomaali, waaxda luqadaha, qaybta kaalmada adeegyada, astrid gibson. So [facility] [phone].    ATENCIÓN: Si habla español, tiene a garg disposición servicios gratuitos de asistencia lingüística. Llame al [phone].    We comply with applicable federal civil rights laws and [place] laws. We do not discriminate on the basis of race, color, national origin, age, disability sex, sexual orientation or gender identity.            Thank you!     Thank you for choosing [facility]   for your care. Our goal is always to provide you with excellent care. Hearing back from our patients is one way we can continue to improve our services. Please take a few minutes to complete the written survey that you may receive in the mail after your visit with us. Thank you!             Your Updated Medication List - Protect others around you: Learn how to safely use, store and throw away your medicines at www.disposemymeds.org.          This list is accurate as of: 9/1/17 12:31 PM.  Always use your most recent med list.                   Brand Name Dispense Instructions for use Diagnosis    Calcium-Magnesium 300-300 MG Tabs     90 tablet    Take 1 Dose by mouth 2 times daily    Supervision of other normal pregnancy, antepartum       prenatal multivitamin plus iron 27-0.8 MG Tabs per tablet     100 tablet    Take 1 tablet by mouth daily    Supervision of normal pregnancy

## 2017-09-01 NOTE — PROGRESS NOTES
Please see ultrasound report under imaging tab for details on ultrasound performed today.    Izzy Rodriguez MD  , OB/GYN  Maternal-Fetal Medicine  [email or web address]  [phone] (Academic office)  [phone] (Pager)

## 2017-09-12 ENCOUNTER — NURSE TRIAGE (OUTPATIENT)
Dept: NURSING | Facility: CLINIC | Age: 22
End: 2017-09-12

## 2017-09-13 NOTE — TELEPHONE ENCOUNTER
"  Reason for Disposition    [1] Pregnant 23 or more weeks AND [2] baby moving less today by kick count  (e.g., kick count < 5 in 1 hour or < 10 in 2 hours)    Additional Information    Negative: Sounds like a life-threatening emergency to the triager    Negative: Injury to abdomen    Negative: [1] Pregnant > 36 weeks AND [2] having contractions or other symptoms of labor    Negative: [1] Pregnant < 37 weeks AND [2] having contractions or other symptoms of labor    Negative: [1] Pregnant > 20 weeks AND [2] abdominal pain    Negative: [1] Pregnant > 20 weeks AND [2] vaginal bleeding or spotting    Negative: Blurred vision or visual change    Negative: [1] SEVERE headache AND [2] not relieved with acetaminophen (e.g., Tylenol)    Negative: Leakage of fluid from vagina    Answer Assessment - Initial Assessment Questions  1. FETAL MOVEMENT: \"Has the baby's movement decreased or changed significantly from normal?\" (e.g., yes, no; describe)      yes  2. NIKIA: \"What date are you expecting to deliver?\"       11/8/17  3. PREGNANCY: \"How many weeks pregnant are you?\"       34 weeks  4. OTHER SYMPTOMS: \"Do you have any other symptoms?\" (e.g., abdominal pain, leaking fluid from vagina, vaginal bleeding, etc.)      [place] and back pain    Protocols used: PREGNANCY - DECREASED FETAL MOVEMENT-ADULT-AH    "

## 2017-09-18 ENCOUNTER — PRENATAL OFFICE VISIT (OUTPATIENT)
Dept: OBGYN | Facility: CLINIC | Age: 22
End: 2017-09-18
Payer: COMMERCIAL

## 2017-09-18 VITALS
DIASTOLIC BLOOD PRESSURE: 65 MMHG | HEART RATE: 80 BPM | SYSTOLIC BLOOD PRESSURE: 106 MMHG | OXYGEN SATURATION: 96 % | BODY MASS INDEX: 29.21 KG/M2 | HEIGHT: 59 IN | WEIGHT: 144.9 LBS

## 2017-09-18 DIAGNOSIS — Z34.83 ENCOUNTER FOR SUPERVISION OF OTHER NORMAL PREGNANCY IN THIRD TRIMESTER: Primary | ICD-10-CM

## 2017-09-18 DIAGNOSIS — Z23 NEED FOR TDAP VACCINATION: ICD-10-CM

## 2017-09-18 PROCEDURE — 99207 ZZC PRENATAL VISIT: CPT | Performed by: OBSTETRICS & GYNECOLOGY

## 2017-09-18 PROCEDURE — 90715 TDAP VACCINE 7 YRS/> IM: CPT | Performed by: OBSTETRICS & GYNECOLOGY

## 2017-09-18 PROCEDURE — 90471 IMMUNIZATION ADMIN: CPT | Performed by: OBSTETRICS & GYNECOLOGY

## 2017-09-18 NOTE — PROGRESS NOTES
34w6d   No complaints.  Baby is moving well.    Taking her prenatal vitamin. rec iron as well.  hgb was 10.8.   Passed GCT.  Position uncertain today. Does not feel vtx.    Will check again next visit.   Tdap today.   SHUBHAM

## 2017-09-18 NOTE — MR AVS SNAPSHOT
After Visit Summary   9/18/2017    Pa Kev Garcia    MRN: [id number]           Patient Information     Date Of Birth          1995        Visit Information        Provider Department      9/18/2017 5:15 PM Mely Farnsworth MD [facility]        Today's Diagnoses     Encounter for supervision of other normal pregnancy in third trimester    -  1    Need for Tdap vaccination           Follow-ups after your visit        Your next 10 appointments already scheduled     Sep 28, 2017  8:00 AM CDT   MFM US COMPRE SINGLE F/U with [id number]   [facility]etal [facility] Ultrasound - [facility])    [address]  [facility] [address] [phone]           Wear comfortable clothes and leave your valuables at home.            Sep 28, 2017  8:30 AM CDT   Radiology MD with UR MFCHUYITA BARKER   [facility] M[facility] - [facility])    [address]  [facility] [address] [phone]           Please arrive at the time given for your first appointment. This visit is used internally to schedule the physician's time during your ultrasound.            Oct 02, 2017 11:15 AM CDT   ESTABLISHED PRENATAL with Mely Farnsworth MD   [facility] ([facility])    [address] [place] [address] [phone]              Who to contact     If you have questions or need follow up information about today's clinic visit or your schedule please contact [facility] directly at [phone].  Normal or non-critical lab and imaging results will be communicated to you by MyChart, letter or phone within 4 business days after the clinic has received the results. If you do not hear from us within 7 days, please contact the clinic through MyChart or phone. If you have a critical or abnormal lab result, we will notify  "you by phone as soon as possible.  Submit refill requests through [email or web address] or call your pharmacy and they will forward the refill request to us. Please allow 3 business days for your refill to be completed.          Additional Information About Your Visit        QDEGA Loyalty Solutions GmbHharNarvalous Information     [email or web address] gives you secure access to your electronic health record. If you see a primary care provider, you can also send messages to your care team and make appointments. If you have questions, please call your primary care clinic.  If you do not have a primary care provider, please call [phone] and they will assist you.        Care EveryWhere ID     This is your Care EveryWhere ID. This could be used by other organizations to access your [facility] medical records  [id number]        Your Vitals Were     Pulse Height Last Period Pulse Oximetry BMI (Body Mass Index)       80 4' 11\" (1.499 m) 01/17/2017 96% 29.27 kg/m2        Blood Pressure from Last 3 Encounters:   09/18/17 106/65   08/08/17 96/69   08/07/17 102/66    Weight from Last 3 Encounters:   09/18/17 144 lb 14.4 oz (65.7 kg)   08/08/17 143 lb (64.9 kg)   08/07/17 141 lb (64 kg)              We Performed the Following     TDAP VACCINE (ADACEL)     VACCINE ADMINISTRATION, INITIAL          Today's Medication Changes          These changes are accurate as of: 9/18/17 11:59 PM.  If you have any questions, ask your nurse or doctor.               Stop taking these medicines if you haven't already. Please contact your care team if you have questions.     Calcium-Magnesium 300-300 MG Tabs   Stopped by:  Mely Farnsworth MD                    Primary Care Provider    Physician No Ref-Primary       No address on file        Equal Access to Services     [facility]: Hadii stephanie Samuel, brittany branham, qaastrid leonard. So [facility] [phone].    ATENCIÓN: Si habla español, tiene a garg disposición servicios gratuitos " de asistencia lingüística. Mynor pennington [phone].    We comply with applicable federal civil rights laws and [place] laws. We do not discriminate on the basis of race, color, national origin, age, disability sex, sexual orientation or gender identity.            Thank you!     Thank you for choosing [facility]  for your care. Our goal is always to provide you with excellent care. Hearing back from our patients is one way we can continue to improve our services. Please take a few minutes to complete the written survey that you may receive in the mail after your visit with us. Thank you!             Your Updated Medication List - Protect others around you: Learn how to safely use, store and throw away your medicines at www.disposemymeds.org.          This list is accurate as of: 9/18/17 11:59 PM.  Always use your most recent med list.                   Brand Name Dispense Instructions for use Diagnosis    prenatal multivitamin plus iron 27-0.8 MG Tabs per tablet     100 tablet    Take 1 tablet by mouth daily    Supervision of normal pregnancy

## 2017-09-25 ENCOUNTER — HOSPITAL ENCOUNTER (OUTPATIENT)
Facility: CLINIC | Age: 22
Discharge: HOME | End: 2017-09-25
Attending: OBSTETRICS & GYNECOLOGY | Admitting: OBSTETRICS & GYNECOLOGY
Payer: COMMERCIAL

## 2017-09-25 ENCOUNTER — TELEPHONE (OUTPATIENT)
Dept: OBGYN | Facility: CLINIC | Age: 22
End: 2017-09-25

## 2017-09-25 VITALS — DIASTOLIC BLOOD PRESSURE: 69 MMHG | SYSTOLIC BLOOD PRESSURE: 109 MMHG | RESPIRATION RATE: 18 BRPM

## 2017-09-25 PROBLEM — Z36.89 ENCOUNTER FOR TRIAGE IN PREGNANT PATIENT: Status: ACTIVE | Noted: 2017-09-25

## 2017-09-25 LAB
A1 MICROGLOB PLACENTAL VAG QL: NEGATIVE
ALBUMIN UR-MCNC: NEGATIVE MG/DL
APPEARANCE UR: CLEAR
BACTERIA #/AREA URNS HPF: ABNORMAL /HPF
BILIRUB UR QL STRIP: NEGATIVE
COLOR UR AUTO: YELLOW
GLUCOSE UR STRIP-MCNC: NEGATIVE MG/DL
HGB UR QL STRIP: NEGATIVE
KETONES UR STRIP-MCNC: 40 MG/DL
LEUKOCYTE ESTERASE UR QL STRIP: NEGATIVE
MUCOUS THREADS #/AREA URNS LPF: PRESENT /LPF
NITRATE UR QL: NEGATIVE
PH UR STRIP: 7 PH (ref 5–7)
RBC #/AREA URNS AUTO: 1 /HPF (ref 0–2)
SOURCE: ABNORMAL
SP GR UR STRIP: 1.01 (ref 1–1.03)
SPECIMEN SOURCE: NORMAL
SQUAMOUS #/AREA URNS AUTO: 6 /HPF (ref 0–1)
UROBILINOGEN UR STRIP-MCNC: NORMAL MG/DL (ref 0–2)
WBC #/AREA URNS AUTO: 2 /HPF (ref 0–2)
WET PREP SPEC: NORMAL

## 2017-09-25 PROCEDURE — 87210 SMEAR WET MOUNT SALINE/INK: CPT | Performed by: INTERNAL MEDICINE

## 2017-09-25 PROCEDURE — 99213 OFFICE O/P EST LOW 20 MIN: CPT | Mod: 25 | Performed by: OBSTETRICS & GYNECOLOGY

## 2017-09-25 PROCEDURE — 87591 N.GONORRHOEAE DNA AMP PROB: CPT | Performed by: INTERNAL MEDICINE

## 2017-09-25 PROCEDURE — 84112 EVAL AMNIOTIC FLUID PROTEIN: CPT | Performed by: INTERNAL MEDICINE

## 2017-09-25 PROCEDURE — 99214 OFFICE O/P EST MOD 30 MIN: CPT

## 2017-09-25 PROCEDURE — 87491 CHLMYD TRACH DNA AMP PROBE: CPT | Performed by: INTERNAL MEDICINE

## 2017-09-25 PROCEDURE — 81001 URINALYSIS AUTO W/SCOPE: CPT | Performed by: INTERNAL MEDICINE

## 2017-09-25 PROCEDURE — 59025 FETAL NON-STRESS TEST: CPT | Mod: 26 | Performed by: OBSTETRICS & GYNECOLOGY

## 2017-09-25 NOTE — TELEPHONE ENCOUNTER
Received call from pt stating she has been having back pain with low stomach pain as well. Stomach tightens when low back pain occurs. Has been occurring since 2:00pm today but has not timed. Denies bleeding. Doesn't know if leaking fluid but is 'always wet'. Positive fetal movement.    [facility] to [facility] and she agreed with plan.    Called [facility] and spoke with Virgie and gave report. At this point realized pt is seen at [facility].

## 2017-09-25 NOTE — IP AVS SNAPSHOT
[facility]    [address]    [place] [address]    Phone:  [phone]                                       After Visit Summary   9/25/2017    Pa Kev Garcia    MRN: [id number]           After Visit Summary Signature Page     I have received my discharge instructions, and my questions have been answered. I have discussed any challenges I see with this plan with the nurse or doctor.    ..........................................................................................................................................  Patient/Patient Representative Signature      ..........................................................................................................................................  Patient Representative Print Name and Relationship to Patient    ..................................................               ................................................  Date                                            Time    ..........................................................................................................................................  Reviewed by Signature/Title    ...................................................              ..............................................  Date                                                            Time

## 2017-09-25 NOTE — IP AVS SNAPSHOT
MRN:[id number]                      After Visit Summary   2017    Pa Kev Garcia    MRN: [id number]           Thank you!     Thank you for choosing [facility] for your care. Our goal is always to provide you with excellent care. Hearing back from our patients is one way we can continue to improve our services. Please take a few minutes to complete the written survey that you may receive in the mail after you visit with us. Thank you!        Patient Information     Date Of Birth          1995        About your hospital stay     You were admitted on:  2017 You last received care in the:   [facility]    You were discharged on:  2017        Reason for your hospital stay       You were seen for evaluation of  cramping. Remember to stay well hydrated at home. You may take a warm (not hot) bath, warm shower, or heating pads for your back. Please keep your OB visit on 10/2.                  Who to Call     For medical emergencies, please call 911.  For non-urgent questions about your medical care, please call your primary care provider or clinic, None          Attending Provider     Provider Specialty    Emma Pardo MD OB/Gyn       Primary Care Provider    Physician No Ref-Primary      After Care Instructions     Diet       Follow this diet upon discharge: Orders Placed This Encounter      Regular Diet Adult                  Your next 10 appointments already scheduled     Sep 28, 2017  8:00 AM CDT   MANDA US COMPRE SINGLE F/U with [id number]   [facility])    [address]  [facility] [address]   [phone]           Wear comfortable clothes and leave your valuables at home.            Sep 28, 2017  8:30 AM CDT   Radiology MD with UR MANDA BARKER   [facility])    [address]  [facility] [address]   [phone]           Please arrive at the time given for your first appointment. This visit is used internally to schedule the physician's time during your ultrasound.            Oct 02, 2017 11:15 AM CDT   ESTABLISHED PRENATAL with Mely Farnsworth MD   [facility] ([facility])    [address] [place] [id number] [phone]              Further instructions from your care team       Discharge Instruction for Undelivered Patients      You were seen for: Labor Assessment  We Consulted: Dr Tamia Hernandez and Dr Pardo    Diet:   Drink 8 to 12 glasses of liquids (milk, juice, water) every day.     Activity:  Count fetal kicks everyday (see handout)     Call your provider if you notice:  Swelling in your face or increased swelling in your hands or legs.  Headaches that are not relieved by Tylenol (acetaminophen).  Changes in your vision (blurring: seeing spots or stars.)  Nausea (sick to your stomach) and vomiting (throwing up).   Weight gain of 5 pounds or more per week.  Heartburn that doesn't go away.  Signs of bladder infection: pain when you urinate (use the toilet), need to go more often and more urgently.  The bag of ramos (rupture of membranes) breaks, or you notice leaking in your underwear.  Bright red blood in your underwear.  Abdominal (lower belly) or stomach pain.  For first baby: Contractions (tightening) less than 5 minutes apart for one hour or more.  Second (plus) baby: Contractions (tightening) less than 10 minutes apart and getting stronger.    Follow-up:  As scheduled in the clinic          Pending Results     Date and Time Order Name Status Description    9/25/2017 1915 Chlamydia trachomatis PCR In process     9/25/2017 1915 Neisseria gonorrhoea PCR In process             Statement of Approval     Ordered          09/25/17 5611  I have reviewed and agree with all the recommendations and orders  detailed in this document.  EFFECTIVE NOW     Approved and electronically signed by:  Emma Hernandez MD             Admission Information     Date & Time Provider Department Dept. Phone    9/25/2017 Emma Pardo MD [facility] [phone]      Your Vitals Were     Blood Pressure Respirations Last Period             109/69 18 01/17/2017         MyChart Information     [employer] gives you secure access to your electronic health record. If you see a primary care provider, you can also send messages to your care team and make appointments. If you have questions, please call your primary care clinic.  If you do not have a primary care provider, please call [phone] and they will assist you.        Care EveryWhere ID     This is your Care EveryWhere ID. This could be used by other organizations to access your [place] medical records  [id number]        Equal Access to Services     DUONG DUNN : Raymond Samuel, waaxjovan doeqrayna, qamelvi kaalkomal barlow, astrid bosch . So [facility] [phone].    ATENCIÓN: Si habla español, tiene a garg disposición servicios gratuitos de asistencia lingüística. Llguido al [phone].    We comply with applicable federal civil rights laws and [place] laws. We do not discriminate on the basis of race, color, national origin, age, disability sex, sexual orientation or gender identity.               Review of your medicines      CONTINUE these medicines which have NOT CHANGED        Dose / Directions    prenatal multivitamin plus iron 27-0.8 MG Tabs per tablet   Used for:  Supervision of normal pregnancy        Dose:  1 tablet   Take 1 tablet by mouth daily   Quantity:  100 tablet   Refills:  3                Protect others around you: Learn how to safely use, store and throw away your medicines at www.disposemymeds.org.             Medication List: This is a list of all your medications and when to take them. Check marks below indicate  your daily home schedule. Keep this list as a reference.      Medications           Morning Afternoon Evening Bedtime As Needed    prenatal multivitamin plus iron 27-0.8 MG Tabs per tablet   Take 1 tablet by mouth daily                                          More Information        Kick Counts  It s normal to worry about your baby s health. One way you can know your baby s doing well is to record the baby s movements once a day. This is called a kick count. You will usually feel your baby move by the 20th week of pregnancy. Remember to take your kick count records to all your appointments with your healthcare provider.       How to Count Kicks    Choose a time when the baby is active, such as after a meal.     Sit comfortably or lie on your side.     The first time the baby moves, write down the time.     Count each movement until the baby has moved 10 times. This can take from 20 minutes to 2 hours.     If the baby hasn t moved 4 times in 1 hour, pat your stomach to wake the baby up.    Write down the time you feel the baby s 10th movement.    Try to do it at the same time each day.  When to Call Your Healthcare Provider  Call your healthcare provider right away if you notice any of the following:    Your baby moves fewer than 10 times in 2 hours while you re doing kick counts.    Your baby moves much less often than on the days before.    You have not felt your baby move all day.    [id number] [facility], [address] [place] [address], [place] [address]. All rights reserved. This information is not intended as a substitute for professional medical care. Always follow your healthcare professional's instructions.

## 2017-09-26 LAB
C TRACH DNA SPEC QL NAA+PROBE: NEGATIVE
N GONORRHOEA DNA SPEC QL NAA+PROBE: NEGATIVE
SPECIMEN SOURCE: NORMAL
SPECIMEN SOURCE: NORMAL

## 2017-09-26 NOTE — DISCHARGE INSTRUCTIONS
Discharge Instruction for Undelivered Patients      You were seen for: Labor Assessment  We Consulted: Dr Tamia Hernandez and Dr Pardo    Diet:   Drink 8 to 12 glasses of liquids (milk, juice, water) every day.     Activity:  Count fetal kicks everyday (see handout)     Call your provider if you notice:  Swelling in your face or increased swelling in your hands or legs.  Headaches that are not relieved by Tylenol (acetaminophen).  Changes in your vision (blurring: seeing spots or stars.)  Nausea (sick to your stomach) and vomiting (throwing up).   Weight gain of 5 pounds or more per week.  Heartburn that doesn't go away.  Signs of bladder infection: pain when you urinate (use the toilet), need to go more often and more urgently.  The bag of ramos (rupture of membranes) breaks, or you notice leaking in your underwear.  Bright red blood in your underwear.  Abdominal (lower belly) or stomach pain.  For first baby: Contractions (tightening) less than 5 minutes apart for one hour or more.  Second (plus) baby: Contractions (tightening) less than 10 minutes apart and getting stronger.    Follow-up:  As scheduled in the clinic

## 2017-09-26 NOTE — PLAN OF CARE
Data: [facility] at 1800.   Reason for maternal/fetal assessment per patient is Rule Out Labor  . Patient is a . Prenatal record reviewed.      Obstetric History       T1      L1     SAB1   TAB0   Ectopic0   Multiple0   Live Births1       # Outcome Date GA Lbr Josh/2nd Weight Sex Delivery Anes PTL Lv   5 Current            4 2016     SAB      3 SAB 2016     SAB      2 TAB 2015     TAB      1 Term 12 38w4d 05:42 / 00:09 2.92 kg (6 lb 7 oz) M Vag-Spont Local N MARGARET      Name: JUSTIN DENISE PA      Apgar1:  9                Apgar5: 9         Medical History:   Past Medical History:   Diagnosis Date     Papanicolaou smear of cervix with atypical squamous cells of undetermined significance (ASC-US) 16   . Gestational Age 34w2d. VSS. Cervix: posterior, fingertip dilated, long, thick and firm.  Fetal movement present. Patient denies cramping, backache, vaginal discharge, pelvic pressure, UTI symptoms, GI problems, bloody show, vaginal bleeding, edema, headache, visual disturbances, epigastric or URQ pain, abdominal pain, rupture of membranes. Support persons not present.  Action: Verbal consent for EFM. Triage assessment completed. EFM applied for continuous monitoring. Uterine assessment done. Fetal assessment: Presumed adequate fetal oxygenation documented (see flow record). Patient education pamphlets given on fetal movement counts. Patient instructed to report change in fetal movement, vaginal leaking of fluid or bleeding, abdominal pain, or any concerns related to the pregnancy to her nurse/physician.   Response: Dr. Hernandez informed of arrival. Plan per provider is discharge home with encouragement to eat and drink. Patient verbalized understanding of education and verbalized agreement with plan. Discharged ambulatory at 2135.

## 2017-09-26 NOTE — PROGRESS NOTES
Labor and Delivery Triage Note    Kd Garcia MRN# [id number]   Age: 22 year old YOB: 1995     Date of Admission: 2017         CC:    Lower abdominal pain,  contractions (rule out  labor)         HPI:   Kd Garcia is a 22 year old  at 34w2d by 9w4d US not consistent w/ LMP complicated by late prenatal care who presents for rule out  labor.    Patient endorses onset of lower abdominal/pelvic pain and low back pain at approx 1430. Pain persisted until 1500, upon which patient moved to left lateral position and pain became more intermittent, however has persisted until currently. She endorses fetal movement, denies loss of fluid or vaginal bleeding. She endorses she's been sleeping most of the day, however feels she is hydrated. She has had loose stools throughout the day today. She endorses a mild HA, denies any vision changes, dyspnea, chest pain, RUQ pain, nausea or vomiting.    OB Problem List:   1. IUP at 34w2d   2. Late entry to prenatal care           Pregnancy history:     OBSTETRIC HISTORY:    Obstetric History       T1      L1     SAB1   TAB0   Ectopic0   Multiple0   Live Births1       # Outcome Date GA Lbr Josh/2nd Weight Sex Delivery Anes PTL Lv   5 Current            4 SAB 2016     SAB      3 SAB 2016     SAB      2 TAB 2015     TAB      1 Term 12 38w4d 05:42 / 00:09 2.92 kg (6 lb 7 oz) M Vag-Spont Local N MARGARET      Name: JUSTIN GARCIA      Apgar1:  9                Apgar5: 9          Prenatal Labs:   Lab Results   Component Value Date    ABO O 2017    RH  Pos 2017    AS Neg 2017    HEPBANG Nonreactive 2017    CHPCRT  2017     Negative   Negative for C. trachomatis rRNA by transcription mediated amplification.   A negative result by transcription mediated amplification does not preclude the   presence of C. trachomatis infection because results are dependent on proper   and adequate collection, absence  of inhibitors, and sufficient rRNA to be   detected.      GCPCRT  08/03/2017     Negative   Negative for N. gonorrhoeae rRNA by transcription mediated amplification.   A negative result by transcription mediated amplification does not preclude the   presence of N. gonorrhoeae infection because results are dependent on proper   and adequate collection, absence of inhibitors, and sufficient rRNA to be   detected.      TREPAB Negative 08/09/2017    RUBELLAABIGG 21 02/07/2012    HGB 10.8 (L) 08/09/2017    HIV Negative 02/07/2012       GBS Status:   Lab Results   Component Value Date    GBS  08/10/2012     Negative: No GBS DNA detected, presumed negative for GBS or number of bacteria   may be below the limit of detection of the assay.   Assay performed on incubated broth culture of specimen using Cepheid   SmartCycler(R) real-time PCR.       Active Problem List  Patient Active Problem List   Diagnosis     CARDIOVASCULAR SCREENING; LDL GOAL LESS THAN 160     Not immune to rubella     Papanicolaou smear of cervix with atypical squamous cells of undetermined significance (ASC-US)     Need for Tdap vaccination     Late prenatal care     Encounter for triage in pregnant patient       Medication Prior to Admission  Prescriptions Prior to Admission   Medication Sig Dispense Refill Last Dose     Prenatal Vit-Fe Fumarate-FA (PRENATAL MULTIVITAMIN PLUS IRON) 27-0.8 MG TABS per tablet Take 1 tablet by mouth daily 100 tablet 3 More than a month at Unknown time           Maternal Past Medical History:     Past Medical History:   Diagnosis Date     Papanicolaou smear of cervix with atypical squamous cells of undetermined significance (ASC-US) 5/25/16                       Family History:     Family History   Problem Relation Age of Onset     C.A.D. Father 70     MI     Hypertension Father             Social History:     Social History     Social History     Marital status: Single     Spouse name: N/A     Number of children: 0      Years of education: N/A     Occupational History     student      Social History Main Topics     Smoking status: Former Smoker     Smokeless tobacco: Never Used     Alcohol use No     Drug use: No     Sexual activity: Yes     Partners: Male      Comment: none     Other Topics Concern     Parent/Sibling W/ Cabg, Mi Or Angioplasty Before 65f 55m? No     Social History Narrative            Review of Systems:   Negative except as noted above in the HPI.         Physical Exam:     Vitals:    17 1836   BP: 109/69   Resp: 18     Gen: NAD.  Cardio: RRR  Resp: CTAB  Abdomen: gravid, soft, nontender.  Cervix: 0/0/high, posterior  Fetal Heart Rate Tracin, moderate variability, + accels, no decels  Tocometer: 3/10    Imaging:    Dating Ultrasound   GA: 9w4d Single IUP, +Fetal cardiac activity   Fetal Anatomy Survey   GA: 27w4d      Single IUP, prominent fetal bladder, otherwise fetal Anatomy WNL, 3 VC, Placenta: posterior           Assessment:   Kd Garcia is a 22 year old  at 34w2d by 9w4d US not consistent w/ LMP who presents for ROPTL. Patient appears comfortable, pressures stable, mild headache, denied tylenol. Cervix closed, posterior. Assess for dehydration, UTI, genital tract infection.        Plan:     ROPTL: Patient currently having  cramping and contractions. She has had loose stools throughout the day and has been sleeping. The loose stools and poor hydration status today may be contributing to her cramping.    -Encourage adequate hydration   -UA    -wet prep negative   -Amnisure negative   -GCchlam pending - will contact patient if positive result   -Continue monitoring FHT and tocometer for change    -Fetal Well Being   - Category I FHT. Reactive and reassuring.   - Continue EFM and [place]    - Prenatal care   - Next OB visit 10/2/17 with Dr. Farnsworth   - Pregnancy dating changed to reflect dating by early US at 9w4d instead of by LMP, as LMP was 1w4d off from early US. This places  patient at 34w2d.    Geoffrey Beebe, MS3  OB service    _________________________________________  I have seen and examined the patient with Geoffrey Beebe and appreciate the note above. In brief, Ms. Garcia is a 22-year-old  at 34w2d here for evaluation of  contractions and to rule out  labor. She reports lower abdominal cramping and low back pain that started at 1400. She has spent most of the day in bed, as she has been tired and had loose stools all day. She thinks she is dehydrated but denies any nausea or vomiting. She reports good fetal movement and denies vaginal bleeding or loss of fluid.    O:  Vitals:    17 1836   BP: 109/69   Resp: 18   Gen - alert, oriented, no acute distress  Rep - clear to auscultation bilaterally  SSE - cervix appears visually closed, scant physiologic discharge, GC/CT and wet prep obtained, cvx external os FT/long/high    A/P: Ms. Garcia is a 22-year-old at  at 34w2d by 9w4d US here for evaluation of  cramping/contractions. Wet prep and UA negative. GC/CT pending (will contact patient if positive results). Cramping has improved over time in triage. FHT reactive and reassuring. D/c to home with instructions to stay adequately hydrated and to use heating pack on back or take warm bath/shower. Keep follow-up OB visit on 10/2 with Dr. Farnsworth.    Emma Hernandez MD  OB/GYN, PGY2  17    Attestation:   This patient was seen and evaluated by me, separately from the house staff team. I have reviewed the note/plan above and agree.     NST reactive and some irritability, but few contractions.  Not in labor. EDC changed to proper dating while here tonight, will f/u in clinic.    Emma Pardo MD

## 2017-10-02 ENCOUNTER — PRENATAL OFFICE VISIT (OUTPATIENT)
Dept: OBGYN | Facility: CLINIC | Age: 22
End: 2017-10-02
Payer: COMMERCIAL

## 2017-10-02 VITALS
WEIGHT: 147.5 LBS | OXYGEN SATURATION: 96 % | DIASTOLIC BLOOD PRESSURE: 56 MMHG | HEIGHT: 59 IN | BODY MASS INDEX: 29.73 KG/M2 | SYSTOLIC BLOOD PRESSURE: 96 MMHG | HEART RATE: 73 BPM | TEMPERATURE: 97.9 F

## 2017-10-02 DIAGNOSIS — O09.30 LATE PRENATAL CARE: Primary | ICD-10-CM

## 2017-10-02 PROCEDURE — 99207 ZZC PRENATAL VISIT: CPT | Performed by: OBSTETRICS & GYNECOLOGY

## 2017-10-02 NOTE — PROGRESS NOTES
35w2d   Working 8 hrs every night.  Getting leg cramps, tired, dizzy, heart has been beating fast.    Hard to keep up that pace with this type of job. Probably will stop working d/t unable to keep up with advanced pregnancy.   Baby is moving well.  Was seen for  ctx's on labor and delivery last week. Cervix was fingertip.  No further ctx's.    Cephalic today by bedside US.  SHUBHAM

## 2017-10-02 NOTE — MR AVS SNAPSHOT
After Visit Summary   10/2/2017    Kd Schultzleonor Garcia    MRN: [id number]           Patient Information     Date Of Birth          1995        Visit Information        Provider Department      10/2/2017 11:15 AM Mely Farnsworth MD [facility]        Today's Diagnoses     Late prenatal care    -  1       Follow-ups after your visit        Your next 10 appointments already scheduled     Oct 04, 2017  8:45 AM CDT   MFM US COMPRE SINGLE F/U with [id number]   [facility])    [address]  [facility] [id number] [phone]           Wear comfortable clothes and leave your valuables at home.            Oct 04, 2017  9:15 AM CDT   Radiology MD with UR MANDA BARKER   [facility])    [address]  [facility] [address]   [phone]           Please arrive at the time given for your first appointment. This visit is used internally to schedule the physician's time during your ultrasound.            Oct 09, 2017 11:30 AM CDT   ESTABLISHED PRENATAL with Izzy Mejia MD   [facility] ([facility])    [address]  [address]  [facility] [address]   [phone]            Oct 16, 2017  1:45 PM CDT   ESTABLISHED PRENATAL with Mely Farnsworth MD   [facility] ([facility]    [address]  [address]  [facility] [address]   [phone]            Oct 24, 2017  1:15 PM CDT   ESTABLISHED PRENATAL with Mely Farnsworth MD   [facility]    [address]  [address]  [facility] [address]   [phone]            Nov 01, 2017 11:15 AM CDT   ESTABLISHED PRENATAL with Mely Farnsworth MD   [facility] ([facility]     "[address] [place] [address] [phone]              Who to contact     If you have questions or need follow up information about today's clinic visit or your schedule please contact [facility] directly at [phone].  Normal or non-critical lab and imaging results will be communicated to you by MyChart, letter or phone within 4 business days after the clinic has received the results. If you do not hear from us within 7 days, please contact the clinic through [employer] or phone. If you have a critical or abnormal lab result, we will notify you by phone as soon as possible.  Submit refill requests through [employer] or call your pharmacy and they will forward the refill request to us. Please allow 3 business days for your refill to be completed.          Additional Information About Your Visit        MyChart Information     [employer] gives you secure access to your electronic health record. If you see a primary care provider, you can also send messages to your care team and make appointments. If you have questions, please call your primary care clinic.  If you do not have a primary care provider, please call [phone] and they will assist you.        Care EveryWhere ID     This is your Care EveryWhere ID. This could be used by other organizations to access your [place] medical records  [id number]        Your Vitals Were     Pulse Temperature Height Last Period Pulse Oximetry BMI (Body Mass Index)    73 97.9  F (36.6  C) (Oral) 4' 11\" (1.499 m) 01/21/2017 96% 29.79 kg/m2       Blood Pressure from Last 3 Encounters:   10/02/17 96/56   09/25/17 109/69   09/18/17 106/65    Weight from Last 3 Encounters:   10/02/17 147 lb 8 oz (66.9 kg)   09/18/17 144 lb 14.4 oz (65.7 kg)   08/08/17 143 lb (64.9 kg)              Today, you had the following     No orders found for display       Primary Care Provider Fax #    Physician No Ref-Primary [phone]       No address on " file        Equal Access to Services     [facility] MONA : Hadii aad ku hadigorrosalia Kathiedeandre, waankurda zeinaioanaha, brendalexie calderónpatricastrid porter. So [facility] [phone].    ATENCIÓN: Si habla español, tiene a garg disposición servicios gratuitos de asistencia lingüística. Llame al [phone].    We comply with applicable federal civil rights laws and [place] laws. We do not discriminate on the basis of race, color, national origin, age, disability, sex, sexual orientation, or gender identity.            Thank you!     Thank you for [place] [facility]  [place] your [place]. Our goal is always to provide you with excellent care. Hearing back from our patients is one way we can continue to improve our services. Please take a few minutes to complete the written survey that you may receive in the mail after your visit with us. Thank you!             Your Updated Medication List - Protect others around you: Learn how to safely use, store and throw away your medicines at www.disposemymeds.org.          This list is accurate as of: 10/2/17  1:53 PM.  Always use your most recent med list.                   Brand Name Dispense Instructions for use Diagnosis    prenatal multivitamin plus iron 27-0.8 MG Tabs per tablet     100 tablet    Take 1 tablet by mouth daily    Supervision of normal pregnancy

## 2017-10-04 ENCOUNTER — OFFICE VISIT (OUTPATIENT)
Dept: MATERNAL FETAL MEDICINE | Facility: CLINIC | Age: 22
End: 2017-10-04
Attending: OBSTETRICS & GYNECOLOGY
Payer: COMMERCIAL

## 2017-10-04 ENCOUNTER — HOSPITAL ENCOUNTER (OUTPATIENT)
Dept: ULTRASOUND IMAGING | Facility: CLINIC | Age: 22
Discharge: HOME | End: 2017-10-04
Attending: OBSTETRICS & GYNECOLOGY | Admitting: OBSTETRICS & GYNECOLOGY
Payer: COMMERCIAL

## 2017-10-04 DIAGNOSIS — Z36.89 ENCOUNTER FOR ULTRASOUND TO CHECK FETAL GROWTH: Primary | ICD-10-CM

## 2017-10-04 DIAGNOSIS — O35.9XX0 SUSPECTED FETAL ANOMALY, ANTEPARTUM, NOT APPLICABLE OR UNSPECIFIED FETUS: ICD-10-CM

## 2017-10-04 PROCEDURE — 76816 OB US FOLLOW-UP PER FETUS: CPT

## 2017-10-04 NOTE — MR AVS SNAPSHOT
After Visit Summary   10/4/2017    Pa Kev Garcia    MRN: [id number]           Patient Information     Date Of Birth          1995        Visit Information        Provider Department      10/4/2017 9:15 AM Emma Newsome DO [facility] Maternal Fetal Medicine [facility]        Today's Diagnoses     Encounter for ultrasound to check fetal growth    -  1       Follow-ups after your visit        Your next 10 appointments already scheduled     Oct 09, 2017 11:30 AM CDT   ESTABLISHED PRENATAL with Izzy Mejia MD   [facility] ([facility])    [address] [facility] [address]  [facility] [address]   [phone]            Oct 16, 2017  1:45 PM CDT   ESTABLISHED PRENATAL with Mely Farnsworth MD   [facility] ([facility])    [address] [facility] [address]  [facility] [address]   [phone]            Oct 24, 2017  1:15 PM CDT   ESTABLISHED PRENATAL with Mely Farnsworth MD   [facility] ([facility])    [address] [facility] [address]  [facility] [address]   [phone]            Oct 25, 2017  8:00 AM CDT   MFM US COMPRE SINGLE F/U with [id number]   [facility] Maternal Fetal Medicine Ultrasound - [facility])    [address]  [facility] [address] [phone]           Wear comfortable clothes and leave your valuables at home.            Oct 25, 2017  8:30 AM CDT   Radiology MD with UR MANDA BARKER   [facility] Maternal Fetal Medicine - [facility])    [address]  [facility] [address]   [phone]           Please arrive at the time given for your first appointment. This visit is used internally to schedule the physician's time during your ultrasound.            Nov 01, 2017 11:15 AM CDT   ESTABLISHED PRENATAL with Mely Farnsworth MD   [place]  [facility] ([facility])    [address] [place] [address] [phone]              Future tests that were ordered for you today     Open Future Orders        Priority Expected Expires Ordered    [facility] Comprehensive Single F/U Routine 10/25/2017 10/4/2018 10/4/2017            Who to contact     If you have questions or need follow up information about today's clinic visit or your schedule please contact [facility] MATERNAL FETAL MEDICINE [facility] directly at [phone].  Normal or non-critical lab and imaging results will be communicated to you by Evehart, letter or phone within 4 business days after the clinic has received the results. If you do not hear from us within 7 days, please contact the clinic through [email or web address] or phone. If you have a critical or abnormal lab result, we will notify you by phone as soon as possible.  Submit refill requests through [email or web address] or call your pharmacy and they will forward the refill request to us. Please allow 3 business days for your refill to be completed.          Additional Information About Your Visit        YouChe.com Information     [email or web address] gives you secure access to your electronic health record. If you see a primary care provider, you can also send messages to your care team and make appointments. If you have questions, please call your primary care clinic.  If you do not have a primary care provider, please call [phone] and they will assist you.        Care EveryWhere ID     This is your Care EveryWhere ID. This could be used by other organizations to access your [place] medical records  [id number]        Your Vitals Were     Last Period                   01/21/2017            Blood Pressure from Last 3 Encounters:   10/02/17 96/56   09/25/17 109/69   09/18/17 106/65    Weight from Last 3 Encounters:   10/02/17 66.9 kg (147 lb 8 oz)   09/18/17 65.7 kg (144 lb 14.4 oz)   08/08/17 64.9 kg (143 lb)                Primary Care Provider Fax #    Physician No Ref-Primary [phone]       No address on file        Equal Access to Services     DUONG DUNN : Hadii aad ku hadigorrosalia Lizzie, brittany zeinaioanaha, eron mason veronicaart, astrid jaminin hayaaru martinkarol proctor lakpru gibson. So [facility] [phone].    ATENCIÓN: Si habla español, tiene a garg disposición servicios gratuitos de asistencia lingüística. Llame al [phone].    We comply with applicable federal civil rights laws and [place] laws. We do not discriminate on the basis of race, color, national origin, age, disability, sex, sexual orientation, or gender identity.            Thank you!     Thank you for choosing [facility]ER[facility]ETAL [facility]  for your care. Our goal is always to provide you with excellent care. Hearing back from our patients is one way we can continue to improve our services. Please take a few minutes to complete the written survey that you may receive in the mail after your visit with us. Thank you!             Your Updated Medication List - Protect others around you: Learn how to safely use, store and throw away your medicines at www.disposemymeds.org.          This list is accurate as of: 10/4/17 10:14 AM.  Always use your most recent med list.                   Brand Name Dispense Instructions for use Diagnosis    prenatal multivitamin plus iron 27-0.8 MG Tabs per tablet     100 tablet    Take 1 tablet by mouth daily    Supervision of normal pregnancy

## 2017-10-09 ENCOUNTER — PRENATAL OFFICE VISIT (OUTPATIENT)
Dept: OBGYN | Facility: CLINIC | Age: 22
End: 2017-10-09
Payer: COMMERCIAL

## 2017-10-09 VITALS
DIASTOLIC BLOOD PRESSURE: 66 MMHG | SYSTOLIC BLOOD PRESSURE: 107 MMHG | BODY MASS INDEX: 29.89 KG/M2 | HEART RATE: 76 BPM | WEIGHT: 148 LBS | TEMPERATURE: 98.2 F

## 2017-10-09 DIAGNOSIS — Z34.93 PRENATAL CARE IN THIRD TRIMESTER: Primary | ICD-10-CM

## 2017-10-09 LAB — HGB BLD-MCNC: 10.7 G/DL (ref 11.7–15.7)

## 2017-10-09 PROCEDURE — 87653 STREP B DNA AMP PROBE: CPT | Performed by: OBSTETRICS & GYNECOLOGY

## 2017-10-09 PROCEDURE — 00000218 ZZHCL STATISTIC OBHBG - HEMOGLOBIN: Performed by: OBSTETRICS & GYNECOLOGY

## 2017-10-09 PROCEDURE — 36416 COLLJ CAPILLARY BLOOD SPEC: CPT | Performed by: OBSTETRICS & GYNECOLOGY

## 2017-10-09 PROCEDURE — 99207 ZZC PRENATAL VISIT: CPT | Performed by: OBSTETRICS & GYNECOLOGY

## 2017-10-09 NOTE — PROGRESS NOTES
Has some cramping and back pain.  No leakage of fluid or vaginal bleeding.  Good fetal movement.  Declines flu shot.  Group B Strep and Hgb today.  RTC weekly.  MISTY

## 2017-10-09 NOTE — MR AVS SNAPSHOT
After Visit Summary   10/9/2017    Kd Schultzleonor Garcia    MRN: [id number]           Patient Information     Date Of Birth          1995        Visit Information        Provider Department      10/9/2017 11:30 AM Izzy Mejia MD [facility]        Today's Diagnoses     Prenatal care in third trimester    -  1       Follow-ups after your visit        Your next 10 appointments already scheduled     Oct 16, 2017  1:45 PM CDT   ESTABLISHED PRENATAL with Mely Farnsworth MD   [facility] ([facility])    [address] [place] [address] [address]  [facility] [address]   [phone]            Oct 24, 2017  1:15 PM CDT   ESTABLISHED PRENATAL with Mely Farnsworth MD   [facility] ([facility])    [address] [place] [address] [phone]            Oct 25, 2017  8:00 AM CDT   MFM US COMPRE SINGLE F/U with URCHUIYTAFMUSCARLOS ALBERTO   [facility] Maternal Fetal Medicine Ultrasound - [facility])    [address]  [facility] [address] [phone]           Wear comfortable clothes and leave your valuables at home.            Oct 25, 2017  8:30 AM CDT   Radiology MD with UR MANDA BARKER   [facility] Maternal Fetal Medicine - [facility])    [address]  [facility] [address]   [phone]           Please arrive at the time given for your first appointment. This visit is used internally to schedule the physician's time during your ultrasound.            Nov 01, 2017 11:15 AM CDT   ESTABLISHED PRENATAL with Mely Farnswotrh MD   [facility] ([facility])    [address] [place] [address] [address]  [facility] [address] [phone]              Who to contact     If you have questions or need follow up information about today's clinic visit or your schedule please  contact [facility] directly at [phone].  Normal or non-critical lab and imaging results will be communicated to you by MyChart, letter or phone within 4 business days after the clinic has received the results. If you do not hear from us within 7 days, please contact the clinic through MyChart or phone. If you have a critical or abnormal lab result, we will notify you by phone as soon as possible.  Submit refill requests through S[employer] Sciences or call your pharmacy and they will forward the refill request to us. Please allow 3 business days for your refill to be completed.          Additional Information About Your Visit        [employer] Information     [employer] gives you secure access to your electronic health record. If you see a primary care provider, you can also send messages to your care team and make appointments. If you have questions, please call your primary care clinic.  If you do not have a primary care provider, please call [phone] and they will assist you.        Care EveryWhere ID     This is your Care EveryWhere ID. This could be used by other organizations to access your [place] medical records  [id number]        Your Vitals Were     Pulse Temperature Last Period BMI (Body Mass Index)          76 98.2  F (36.8  C) (Oral) 01/21/2017 29.89 kg/m2         Blood Pressure from Last 3 Encounters:   10/09/17 107/66   10/02/17 96/56   09/25/17 109/69    Weight from Last 3 Encounters:   10/09/17 148 lb (67.1 kg)   10/02/17 147 lb 8 oz (66.9 kg)   09/18/17 144 lb 14.4 oz (65.7 kg)              Today, you had the following     No orders found for display       Primary Care Provider    Physician No Ref-Primary       NO REF-PRIMARY PHYSICIAN        Equal Access to Services     : Hadii stephanie Samuel, brittany branham, qaybta astrid adler . So [facility] [phone].    ATENCIÓN: Si habla español, tiene a garg disposición servicios  jose de asistencia lingüística. Mynor pennington [phone].    We comply with applicable federal civil rights laws and [place] laws. We do not discriminate on the basis of race, color, national origin, age, disability, sex, sexual orientation, or gender identity.            Thank you!     Thank you for choosing [facility]  for your care. Our goal is always to provide you with excellent care. Hearing back from our patients is one way we can continue to improve our services. Please take a few minutes to complete the written survey that you may receive in the mail after your visit with us. Thank you!             Your Updated Medication List - Protect others around you: Learn how to safely use, store and throw away your medicines at www.disposemymeds.org.          This list is accurate as of: 10/9/17 11:39 AM.  Always use your most recent med list.                   Brand Name Dispense Instructions for use Diagnosis    prenatal multivitamin plus iron 27-0.8 MG Tabs per tablet     100 tablet    Take 1 tablet by mouth daily    Supervision of normal pregnancy

## 2017-10-10 LAB
GP B STREP DNA SPEC QL NAA+PROBE: NEGATIVE
SPECIMEN SOURCE: NORMAL

## 2017-10-16 ENCOUNTER — PRENATAL OFFICE VISIT (OUTPATIENT)
Dept: OBGYN | Facility: CLINIC | Age: 22
End: 2017-10-16
Payer: COMMERCIAL

## 2017-10-16 VITALS
DIASTOLIC BLOOD PRESSURE: 67 MMHG | OXYGEN SATURATION: 96 % | WEIGHT: 149.5 LBS | HEIGHT: 59 IN | HEART RATE: 101 BPM | SYSTOLIC BLOOD PRESSURE: 110 MMHG | TEMPERATURE: 96.9 F | BODY MASS INDEX: 30.14 KG/M2

## 2017-10-16 DIAGNOSIS — Z34.83 ENCOUNTER FOR SUPERVISION OF OTHER NORMAL PREGNANCY IN THIRD TRIMESTER: Primary | ICD-10-CM

## 2017-10-16 PROCEDURE — 99207 ZZC PRENATAL VISIT: CPT | Performed by: OBSTETRICS & GYNECOLOGY

## 2017-10-16 NOTE — MR AVS SNAPSHOT
After Visit Summary   10/16/2017    Kd Schultzleonor Garcia    MRN: [id number]           Patient Information     Date Of Birth          1995        Visit Information        Provider Department      10/16/2017 1:45 PM Mely Farnsworth MD [facility]        Today's Diagnoses     Encounter for supervision of other normal pregnancy in third trimester    -  1       Follow-ups after your visit        Your next 10 appointments already scheduled     Oct 24, 2017  1:15 PM CDT   ESTABLISHED PRENATAL with Mely Farnsworth MD   [facility] ([facility])    [address] [facility]   [facility] [place] [address] [phone]            Oct 25, 2017  8:00 AM CDT   MFM US COMPRE SINGLE F/U with COLLINUSCARLOS ALBERTO   [facility] Maternal Fetal Medicine Ultrasound - [facility])    [address]  [facility] [place] [address] [phone]           Wear comfortable clothes and leave your valuables at home.            Oct 25, 2017  8:30 AM CDT   Radiology MD with EDGARDO HOLMAN MD   [facility] Maternal Fetal Medicine - [facility])    [address]  [facility] [id number] [phone]           Please arrive at the time given for your first appointment. This visit is used internally to schedule the physician's time during your ultrasound.            Nov 01, 2017 11:15 AM CDT   ESTABLISHED PRENATAL with Mely Farnsworth MD   [facility] ([facility])    [address] [place] [address] [address]  [facility] [place] [address] [phone]              Who to contact     If you have questions or need follow up information about today's clinic visit or your schedule please contact [facility] directly at [phone].  Normal or non-critical lab and imaging results will be communicated to you by MyChart, letter or phone within 4 business days  "after the clinic has received the results. If you do not hear from us within 7 days, please contact the clinic through AllofMe or phone. If you have a critical or abnormal lab result, we will notify you by phone as soon as possible.  Submit refill requests through AllofMe or call your pharmacy and they will forward the refill request to us. Please allow 3 business days for your refill to be completed.          Additional Information About Your Visit        [employer] Information     AllofMe gives you secure access to your electronic health record. If you see a primary care provider, you can also send messages to your care team and make appointments. If you have questions, please call your primary care clinic.  If you do not have a primary care provider, please call [phone] and they will assist you.        Care EveryWhere ID     This is your Care EveryWhere ID. This could be used by other organizations to access your [place] medical records  [id number]        Your Vitals Were     Pulse Temperature Height Last Period Pulse Oximetry BMI (Body Mass Index)    101 96.9  F (36.1  C) (Oral) 4' 11\" (1.499 m) 01/21/2017 96% 30.2 kg/m2       Blood Pressure from Last 3 Encounters:   10/16/17 110/67   10/09/17 107/66   10/02/17 96/56    Weight from Last 3 Encounters:   10/16/17 149 lb 8 oz (67.8 kg)   10/09/17 148 lb (67.1 kg)   10/02/17 147 lb 8 oz (66.9 kg)              Today, you had the following     No orders found for display       Primary Care Provider    Physician No Ref-Primary       NO REF-PRIMARY PHYSICIAN        Equal Access to Services     ROSARIO [facility] : Hadii aad ku hadasho Soomaali, waaxda luqadaha, qaybta kaalmada adeegyajovan, astrid bosch . So [facility] [phone].    ATENCIÓN: Si habla español, tiene a garg disposición servicios gratuitos de asistencia lingüística. Llame al [phone].    We comply with applicable federal civil rights laws and [place] laws. We do not discriminate " on the basis of race, color, national origin, age, disability, sex, sexual orientation, or gender identity.            Thank you!     Thank you for choosing [facility]  for your care. Our goal is always to provide you with excellent care. Hearing back from our patients is one way we can continue to improve our services. Please take a few minutes to complete the written survey that you may receive in the mail after your visit with us. Thank you!             Your Updated Medication List - Protect others around you: Learn how to safely use, store and throw away your medicines at www.disposemymeds.org.          This list is accurate as of: 10/16/17  2:15 PM.  Always use your most recent med list.                   Brand Name Dispense Instructions for use Diagnosis    IRON SUPPLEMENT PO           prenatal multivitamin plus iron 27-0.8 MG Tabs per tablet     100 tablet    Take 1 tablet by mouth daily    Supervision of normal pregnancy

## 2017-10-16 NOTE — PROGRESS NOTES
37w2d   No complaints.  Baby is moving well.   GBS was negative.  hgb 10.7 so encouraged to take PNV daily along with iron.   Plans to breast and bottle feed.  discussed nitrous oxide and epidurals. First baby was a natural birth.  Pretty fast.    Baby girl so no circ.  JY

## 2017-10-24 ENCOUNTER — PRENATAL OFFICE VISIT (OUTPATIENT)
Dept: OBGYN | Facility: CLINIC | Age: 22
End: 2017-10-24
Payer: COMMERCIAL

## 2017-10-24 VITALS
SYSTOLIC BLOOD PRESSURE: 110 MMHG | OXYGEN SATURATION: 97 % | HEART RATE: 89 BPM | WEIGHT: 152.3 LBS | DIASTOLIC BLOOD PRESSURE: 64 MMHG | TEMPERATURE: 98 F | BODY MASS INDEX: 30.7 KG/M2 | HEIGHT: 59 IN

## 2017-10-24 DIAGNOSIS — Z34.83 ENCOUNTER FOR SUPERVISION OF OTHER NORMAL PREGNANCY IN THIRD TRIMESTER: Primary | ICD-10-CM

## 2017-10-24 PROCEDURE — 99207 ZZC PRENATAL VISIT: CPT | Performed by: OBSTETRICS & GYNECOLOGY

## 2017-10-24 NOTE — MR AVS SNAPSHOT
After Visit Summary   10/24/2017    Pa Kev Garcia    MRN: [id number]           Patient Information     Date Of Birth          1995        Visit Information        Provider Department      10/24/2017 1:15 PM Mely Farnsworth MD [facility]        Today's Diagnoses     Encounter for supervision of other normal pregnancy in third trimester    -  1       Follow-ups after your visit        Your next 10 appointments already scheduled     Oct 25, 2017  8:00 AM CDT   MFM US COMPRE SINGLE F/U with [id number]   [facility] Maternal Fetal Medicine Ultrasound - [facility])    [address]  [facility] [address] [phone]           Wear comfortable clothes and leave your valuables at home.            Oct 25, 2017  8:30 AM CDT   Radiology MD with UR MANDA BARKER   [facility] Maternal Fetal Medicine - [facility])    [address]  [facility] [address] [phone]           Please arrive at the time given for your first appointment. This visit is used internally to schedule the physician's time during your ultrasound.            Nov 01, 2017 11:15 AM CDT   ESTABLISHED PRENATAL with Mely Farnsworth MD   [facility] ([facility])    [address] [place] [address] [phone]              Who to contact     If you have questions or need follow up information about today's clinic visit or your schedule please contact [facility] directly at [phone].  Normal or non-critical lab and imaging results will be communicated to you by MyChart, letter or phone within 4 business days after the clinic has received the results. If you do not hear from us within 7 days, please contact the clinic through MyChart or phone. If you have a critical or abnormal lab result, we will notify you by phone as soon as  "possible.  Submit refill requests through Moontoast or call your pharmacy and they will forward the refill request to us. Please allow 3 business days for your refill to be completed.          Additional Information About Your Visit        [employer] Information     Moontoast gives you secure access to your electronic health record. If you see a primary care provider, you can also send messages to your care team and make appointments. If you have questions, please call your primary care clinic.  If you do not have a primary care provider, please call [phone] and they will assist you.        Care EveryWhere ID     This is your Care EveryWhere ID. This could be used by other organizations to access your [place] medical records  [id number]        Your Vitals Were     Pulse Temperature Height Last Period Pulse Oximetry BMI (Body Mass Index)    89 98  F (36.7  C) (Oral) 4' 11\" (1.499 m) 01/21/2017 97% 30.76 kg/m2       Blood Pressure from Last 3 Encounters:   10/24/17 110/64   10/16/17 110/67   10/09/17 107/66    Weight from Last 3 Encounters:   10/24/17 152 lb 4.8 oz (69.1 kg)   10/16/17 149 lb 8 oz (67.8 kg)   10/09/17 148 lb (67.1 kg)              Today, you had the following     No orders found for display       Primary Care Provider    Physician No Ref-Primary       NO REF-PRIMARY PHYSICIAN        Equal Access to Services     [facility]: Hadii aad ku hadasho Soomaali, waaxda luqadaha, qaybta kaalmada adeegyada, astrid bosch . So [facility] [phone].    ATENCIÓN: Si habla español, tiene a garg disposición servicios gratuitos de asistencia lingüística. Llame al [phone].    We comply with applicable federal civil rights laws and [place] laws. We do not discriminate on the basis of race, color, national origin, age, disability, sex, sexual orientation, or gender identity.            Thank you!     Thank you for choosing [facility]  for your care. Our goal is always to " provide you with excellent care. Hearing back from our patients is one way we can continue to improve our services. Please take a few minutes to complete the written survey that you may receive in the mail after your visit with us. Thank you!             Your Updated Medication List - Protect others around you: Learn how to safely use, store and throw away your medicines at www.disposemymeds.org.          This list is accurate as of: 10/24/17  1:41 PM.  Always use your most recent med list.                   Brand Name Dispense Instructions for use Diagnosis    IRON SUPPLEMENT PO           prenatal multivitamin plus iron 27-0.8 MG Tabs per tablet     100 tablet    Take 1 tablet by mouth daily    Supervision of normal pregnancy

## 2017-10-25 ENCOUNTER — HOSPITAL ENCOUNTER (OUTPATIENT)
Dept: ULTRASOUND IMAGING | Facility: CLINIC | Age: 22
Discharge: HOME | End: 2017-10-25
Attending: OBSTETRICS & GYNECOLOGY | Admitting: OBSTETRICS & GYNECOLOGY
Payer: COMMERCIAL

## 2017-10-25 ENCOUNTER — OFFICE VISIT (OUTPATIENT)
Dept: MATERNAL FETAL MEDICINE | Facility: CLINIC | Age: 22
End: 2017-10-25
Attending: OBSTETRICS & GYNECOLOGY
Payer: COMMERCIAL

## 2017-10-25 DIAGNOSIS — Z36.89 ENCOUNTER FOR ULTRASOUND TO CHECK FETAL GROWTH: ICD-10-CM

## 2017-10-25 DIAGNOSIS — Z36.89 ENCOUNTER FOR ULTRASOUND TO CHECK FETAL GROWTH: Primary | ICD-10-CM

## 2017-10-25 PROCEDURE — 76816 OB US FOLLOW-UP PER FETUS: CPT

## 2017-10-25 NOTE — MR AVS SNAPSHOT
After Visit Summary   10/25/2017    Pa Kev Garcia    MRN: [id number]           Patient Information     Date Of Birth          1995        Visit Information        Provider Department      10/25/2017 8:30 AM Debbie Miranda MD [facility] Maternal Fetal Medicine [facility]        Today's Diagnoses     Encounter for ultrasound to check fetal growth    -  1       Follow-ups after your visit        Your next 10 appointments already scheduled     Nov 01, 2017 11:15 AM CDT   ESTABLISHED PRENATAL with Mely Farnsworth MD   [facility] ([facility])    [address] [place] [id number] [phone]              Who to contact     If you have questions or need follow up information about today's clinic visit or your schedule please contact [facility] MATERNAL FETAL MEDICINE [facility] directly at [phone].  Normal or non-critical lab and imaging results will be communicated to you by MyChart, letter or phone within 4 business days after the clinic has received the results. If you do not hear from us within 7 days, please contact the clinic through WISErghart or phone. If you have a critical or abnormal lab result, we will notify you by phone as soon as possible.  Submit refill requests through [employer] or call your pharmacy and they will forward the refill request to us. Please allow 3 business days for your refill to be completed.          Additional Information About Your Visit        MyChart Information     [employer] gives you secure access to your electronic health record. If you see a primary care provider, you can also send messages to your care team and make appointments. If you have questions, please call your primary care clinic.  If you do not have a primary care provider, please call [phone] and they will assist you.        Care EveryWhere ID     This is your Care EveryWhere ID. This could be used by other organizations to access  your [facility] medical records  [id number]        Your Vitals Were     Last Period                   01/21/2017            Blood Pressure from Last 3 Encounters:   10/24/17 110/64   10/16/17 110/67   10/09/17 107/66    Weight from Last 3 Encounters:   10/24/17 69.1 kg (152 lb 4.8 oz)   10/16/17 67.8 kg (149 lb 8 oz)   10/09/17 67.1 kg (148 lb)              Today, you had the following     No orders found for display       Primary Care Provider    Physician No Ref-Primary       NO REF-PRIMARY PHYSICIAN        Equal Access to Services     [facility]: Hadii stephanie Samuel, wanghia pastranaadaha, qayblexie kaalmajovan barlow, astrid bosch . So [facility] [phone].    ATENCIÓN: Si habla español, tiene a garg disposición servicios gratuitos de asistencia lingüística. Llame al [phone].    We comply with applicable federal civil rights laws and [place] laws. We do not discriminate on the basis of race, color, national origin, age, disability, sex, sexual orientation, or gender identity.            Thank you!     Thank you for choosing [facility] MATERNAL FETAL MEDICINE [facility]  for your care. Our goal is always to provide you with excellent care. Hearing back from our patients is one way we can continue to improve our services. Please take a few minutes to complete the written survey that you may receive in the mail after your visit with us. Thank you!             Your Updated Medication List - Protect others around you: Learn how to safely use, store and throw away your medicines at www.disposemymeds.org.          This list is accurate as of: 10/25/17 10:31 AM.  Always use your most recent med list.                   Brand Name Dispense Instructions for use Diagnosis    IRON SUPPLEMENT PO           prenatal multivitamin plus iron 27-0.8 MG Tabs per tablet     100 tablet    Take 1 tablet by mouth daily    Supervision of normal pregnancy

## 2017-10-25 NOTE — PROGRESS NOTES
"Please see \"Imaging\" tab under \"Chart Review\" for details of today's visit.    Debbie Miranda    "

## 2017-11-01 ENCOUNTER — PRENATAL OFFICE VISIT (OUTPATIENT)
Dept: OBGYN | Facility: CLINIC | Age: 22
End: 2017-11-01
Payer: COMMERCIAL

## 2017-11-01 VITALS
DIASTOLIC BLOOD PRESSURE: 68 MMHG | WEIGHT: 153.7 LBS | HEART RATE: 83 BPM | TEMPERATURE: 98.4 F | BODY MASS INDEX: 31.04 KG/M2 | SYSTOLIC BLOOD PRESSURE: 104 MMHG

## 2017-11-01 DIAGNOSIS — Z34.83 ENCOUNTER FOR SUPERVISION OF OTHER NORMAL PREGNANCY IN THIRD TRIMESTER: Primary | ICD-10-CM

## 2017-11-01 PROCEDURE — 59425 ANTEPARTUM CARE ONLY: CPT | Performed by: OBSTETRICS & GYNECOLOGY

## 2017-11-01 PROCEDURE — 99207 ZZC PRENATAL VISIT: CPT | Performed by: OBSTETRICS & GYNECOLOGY

## 2017-11-01 NOTE — PROGRESS NOTES
39w4d  No complaints.  Baby is moving well.  Nothing is happening in terms of labor.   MFM US last week showed normal growth at 24% 6-10.    discussed IOL next week if still pregnant.  SHUBHAM

## 2017-11-01 NOTE — MR AVS SNAPSHOT
After Visit Summary   11/1/2017    Pa Kev Garcia    MRN: [id number]           Patient Information     Date Of Birth          1995        Visit Information        Provider Department      11/1/2017 11:15 AM Mely Farnsworth MD [facility]        Today's Diagnoses     Encounter for supervision of other normal pregnancy in third trimester    -  1       Follow-ups after your visit        Your next 10 appointments already scheduled     Nov 07, 2017 10:45 AM CST   ESTABLISHED PRENATAL with Mely Farnsworth MD   [facility] ([facility]    [address] [place] [id number] [phone]              Who to contact     If you have questions or need follow up information about today's clinic visit or your schedule please contact [facility] directly at [phone].  Normal or non-critical lab and imaging results will be communicated to you by MyChart, letter or phone within 4 business days after the clinic has received the results. If you do not hear from us within 7 days, please contact the clinic through SolAeroMedhart or phone. If you have a critical or abnormal lab result, we will notify you by phone as soon as possible.  Submit refill requests through [employer]ader or call your pharmacy and they will forward the refill request to us. Please allow 3 business days for your refill to be completed.          Additional Information About Your Visit        MyChart Information     [employer] gives you secure access to your electronic health record. If you see a primary care provider, you can also send messages to your care team and make appointments. If you have questions, please call your primary care clinic.  If you do not have a primary care provider, please call [phone] and they will assist you.        Care EveryWhere ID     This is your Care EveryWhere ID. This could be used by other organizations to access your  [facility] medical records  [id number]        Your Vitals Were     Pulse Temperature Last Period BMI (Body Mass Index)          83 98.4  F (36.9  C) (Oral) 01/21/2017 31.04 kg/m2         Blood Pressure from Last 3 Encounters:   11/01/17 104/68   10/24/17 110/64   10/16/17 110/67    Weight from Last 3 Encounters:   11/01/17 153 lb 11.2 oz (69.7 kg)   10/24/17 152 lb 4.8 oz (69.1 kg)   10/16/17 149 lb 8 oz (67.8 kg)              Today, you had the following     No orders found for display       Primary Care Provider    Physician No Ref-Primary       NO REF-PRIMARY PHYSICIAN        Equal Access to Services     [facility] : Raymond Samuel, brittany branham, eron kaalmajovan barlow, astrid gibson. So [facility] [phone].    ATENCIÓN: Si habla español, tiene a garg disposición servicios gratuitos de asistencia lingüística. Llame al [phone].    We comply with applicable federal civil rights laws and [place] laws. We do not discriminate on the basis of race, color, national origin, age, disability, sex, sexual orientation, or gender identity.            Thank you!     Thank you for choosing [facility]  for your care. Our goal is always to provide you with excellent care. Hearing back from our patients is one way we can continue to improve our services. Please take a few minutes to complete the written survey that you may receive in the mail after your visit with us. Thank you!             Your Updated Medication List - Protect others around you: Learn how to safely use, store and throw away your medicines at www.disposemymeds.org.          This list is accurate as of: 11/1/17 12:13 PM.  Always use your most recent med list.                   Brand Name Dispense Instructions for use Diagnosis    IRON SUPPLEMENT PO           prenatal multivitamin plus iron 27-0.8 MG Tabs per tablet     100 tablet    Take 1 tablet by mouth daily    Supervision of normal pregnancy

## 2017-11-04 ENCOUNTER — HOSPITAL ENCOUNTER (INPATIENT)
Facility: CLINIC | Age: 22
LOS: 1 days | Discharge: HOME | End: 2017-11-05
Attending: OBSTETRICS & GYNECOLOGY | Admitting: OBSTETRICS & GYNECOLOGY
Payer: COMMERCIAL

## 2017-11-04 ENCOUNTER — NURSE TRIAGE (OUTPATIENT)
Dept: NURSING | Facility: CLINIC | Age: 22
End: 2017-11-04

## 2017-11-04 LAB
ABO + RH BLD: NORMAL
ABO + RH BLD: NORMAL
BLD GP AB SCN SERPL QL: NORMAL
BLOOD BANK CMNT PATIENT-IMP: NORMAL
HGB BLD-MCNC: 13.3 G/DL (ref 11.7–15.7)
PLATELET # BLD AUTO: 241 10E9/L (ref 150–450)
SPECIMEN EXP DATE BLD: NORMAL
T PALLIDUM IGG+IGM SER QL: NEGATIVE

## 2017-11-04 PROCEDURE — 12000028 ZZH R&B OB UMMC

## 2017-11-04 PROCEDURE — 86900 BLOOD TYPING SEROLOGIC ABO: CPT | Performed by: OBSTETRICS & GYNECOLOGY

## 2017-11-04 PROCEDURE — 86901 BLOOD TYPING SEROLOGIC RH(D): CPT | Performed by: OBSTETRICS & GYNECOLOGY

## 2017-11-04 PROCEDURE — 25000132 ZZH RX MED GY IP 250 OP 250 PS 637: Performed by: OBSTETRICS & GYNECOLOGY

## 2017-11-04 PROCEDURE — 86850 RBC ANTIBODY SCREEN: CPT | Performed by: OBSTETRICS & GYNECOLOGY

## 2017-11-04 PROCEDURE — 86780 TREPONEMA PALLIDUM: CPT | Performed by: OBSTETRICS & GYNECOLOGY

## 2017-11-04 PROCEDURE — 59410 OBSTETRICAL CARE: CPT | Mod: GC | Performed by: OBSTETRICS & GYNECOLOGY

## 2017-11-04 PROCEDURE — 85018 HEMOGLOBIN: CPT | Performed by: OBSTETRICS & GYNECOLOGY

## 2017-11-04 PROCEDURE — 85049 AUTOMATED PLATELET COUNT: CPT | Performed by: OBSTETRICS & GYNECOLOGY

## 2017-11-04 PROCEDURE — 72200001 ZZH LABOR CARE VAGINAL DELIVERY SINGLE

## 2017-11-04 PROCEDURE — 25000128 H RX IP 250 OP 636

## 2017-11-04 PROCEDURE — 10907ZC DRAINAGE OF AMNIOTIC FLUID, THERAPEUTIC FROM PRODUCTS OF CONCEPTION, VIA NATURAL OR ARTIFICIAL OPENING: ICD-10-PCS | Performed by: OBSTETRICS & GYNECOLOGY

## 2017-11-04 RX ORDER — LIDOCAINE HYDROCHLORIDE 10 MG/ML
INJECTION, SOLUTION EPIDURAL; INFILTRATION; INTRACAUDAL; PERINEURAL
Status: DISCONTINUED
Start: 2017-11-04 | End: 2017-11-04 | Stop reason: WASHOUT

## 2017-11-04 RX ORDER — OXYTOCIN/0.9 % SODIUM CHLORIDE 30/500 ML
340 PLASTIC BAG, INJECTION (ML) INTRAVENOUS CONTINUOUS PRN
Status: DISCONTINUED | OUTPATIENT
Start: 2017-11-04 | End: 2017-11-05 | Stop reason: HOSPADM

## 2017-11-04 RX ORDER — AMOXICILLIN 250 MG
1-2 CAPSULE ORAL 2 TIMES DAILY
Status: DISCONTINUED | OUTPATIENT
Start: 2017-11-04 | End: 2017-11-05 | Stop reason: HOSPADM

## 2017-11-04 RX ORDER — OXYTOCIN/0.9 % SODIUM CHLORIDE 30/500 ML
PLASTIC BAG, INJECTION (ML) INTRAVENOUS
Status: DISCONTINUED
Start: 2017-11-04 | End: 2017-11-04 | Stop reason: HOSPADM

## 2017-11-04 RX ORDER — LANOLIN 100 %
OINTMENT (GRAM) TOPICAL
Status: DISCONTINUED | OUTPATIENT
Start: 2017-11-04 | End: 2017-11-05 | Stop reason: HOSPADM

## 2017-11-04 RX ORDER — ONDANSETRON 2 MG/ML
4 INJECTION INTRAMUSCULAR; INTRAVENOUS EVERY 6 HOURS PRN
Status: DISCONTINUED | OUTPATIENT
Start: 2017-11-04 | End: 2017-11-04

## 2017-11-04 RX ORDER — IBUPROFEN 600 MG/1
600 TABLET, FILM COATED ORAL EVERY 6 HOURS PRN
Qty: 60 TABLET | Refills: 0 | Status: SHIPPED | OUTPATIENT
Start: 2017-11-04 | End: 2017-11-05

## 2017-11-04 RX ORDER — SODIUM CHLORIDE, SODIUM LACTATE, POTASSIUM CHLORIDE, CALCIUM CHLORIDE 600; 310; 30; 20 MG/100ML; MG/100ML; MG/100ML; MG/100ML
INJECTION, SOLUTION INTRAVENOUS CONTINUOUS
Status: DISCONTINUED | OUTPATIENT
Start: 2017-11-04 | End: 2017-11-04

## 2017-11-04 RX ORDER — OXYTOCIN 10 [USP'U]/ML
10 INJECTION, SOLUTION INTRAMUSCULAR; INTRAVENOUS
Status: COMPLETED | OUTPATIENT
Start: 2017-11-04 | End: 2017-11-04

## 2017-11-04 RX ORDER — AMOXICILLIN 250 MG
1-2 CAPSULE ORAL 2 TIMES DAILY
Qty: 100 TABLET | Refills: 0 | Status: SHIPPED | OUTPATIENT
Start: 2017-11-04 | End: 2017-11-05

## 2017-11-04 RX ORDER — IBUPROFEN 800 MG/1
800 TABLET, FILM COATED ORAL
Status: COMPLETED | OUTPATIENT
Start: 2017-11-04 | End: 2017-11-04

## 2017-11-04 RX ORDER — BISACODYL 10 MG
10 SUPPOSITORY, RECTAL RECTAL DAILY PRN
Status: DISCONTINUED | OUTPATIENT
Start: 2017-11-06 | End: 2017-11-05 | Stop reason: HOSPADM

## 2017-11-04 RX ORDER — MISOPROSTOL 200 UG/1
TABLET ORAL
Status: DISCONTINUED
Start: 2017-11-04 | End: 2017-11-04 | Stop reason: WASHOUT

## 2017-11-04 RX ORDER — OXYTOCIN 10 [USP'U]/ML
INJECTION, SOLUTION INTRAMUSCULAR; INTRAVENOUS
Status: COMPLETED
Start: 2017-11-04 | End: 2017-11-04

## 2017-11-04 RX ORDER — OXYTOCIN/0.9 % SODIUM CHLORIDE 30/500 ML
100-340 PLASTIC BAG, INJECTION (ML) INTRAVENOUS CONTINUOUS PRN
Status: DISCONTINUED | OUTPATIENT
Start: 2017-11-04 | End: 2017-11-04

## 2017-11-04 RX ORDER — IBUPROFEN 400 MG/1
400-800 TABLET, FILM COATED ORAL EVERY 6 HOURS PRN
Status: DISCONTINUED | OUTPATIENT
Start: 2017-11-04 | End: 2017-11-05 | Stop reason: HOSPADM

## 2017-11-04 RX ORDER — ACETAMINOPHEN 325 MG/1
650 TABLET ORAL EVERY 4 HOURS PRN
Qty: 100 TABLET | Refills: 0 | Status: SHIPPED | OUTPATIENT
Start: 2017-11-04 | End: 2017-11-05

## 2017-11-04 RX ORDER — OXYTOCIN/0.9 % SODIUM CHLORIDE 30/500 ML
100 PLASTIC BAG, INJECTION (ML) INTRAVENOUS CONTINUOUS
Status: DISCONTINUED | OUTPATIENT
Start: 2017-11-04 | End: 2017-11-05 | Stop reason: HOSPADM

## 2017-11-04 RX ORDER — LIDOCAINE 40 MG/G
CREAM TOPICAL
Status: DISCONTINUED | OUTPATIENT
Start: 2017-11-04 | End: 2017-11-04

## 2017-11-04 RX ORDER — ACETAMINOPHEN 325 MG/1
650 TABLET ORAL EVERY 4 HOURS PRN
Status: DISCONTINUED | OUTPATIENT
Start: 2017-11-04 | End: 2017-11-04

## 2017-11-04 RX ORDER — ACETAMINOPHEN 325 MG/1
650 TABLET ORAL EVERY 4 HOURS PRN
Status: DISCONTINUED | OUTPATIENT
Start: 2017-11-04 | End: 2017-11-05 | Stop reason: HOSPADM

## 2017-11-04 RX ORDER — NALOXONE HYDROCHLORIDE 0.4 MG/ML
.1-.4 INJECTION, SOLUTION INTRAMUSCULAR; INTRAVENOUS; SUBCUTANEOUS
Status: DISCONTINUED | OUTPATIENT
Start: 2017-11-04 | End: 2017-11-04

## 2017-11-04 RX ORDER — OXYCODONE AND ACETAMINOPHEN 5; 325 MG/1; MG/1
1 TABLET ORAL
Status: DISCONTINUED | OUTPATIENT
Start: 2017-11-04 | End: 2017-11-04

## 2017-11-04 RX ORDER — HYDROCORTISONE 2.5 %
CREAM (GRAM) TOPICAL 3 TIMES DAILY PRN
Status: DISCONTINUED | OUTPATIENT
Start: 2017-11-04 | End: 2017-11-05 | Stop reason: HOSPADM

## 2017-11-04 RX ORDER — PRENATAL VIT/IRON FUM/FOLIC AC 27MG-0.8MG
1 TABLET ORAL DAILY
Status: DISCONTINUED | OUTPATIENT
Start: 2017-11-04 | End: 2017-11-05 | Stop reason: HOSPADM

## 2017-11-04 RX ORDER — METHYLERGONOVINE MALEATE 0.2 MG/ML
200 INJECTION INTRAVENOUS
Status: DISCONTINUED | OUTPATIENT
Start: 2017-11-04 | End: 2017-11-04

## 2017-11-04 RX ORDER — OXYTOCIN 10 [USP'U]/ML
10 INJECTION, SOLUTION INTRAMUSCULAR; INTRAVENOUS
Status: DISCONTINUED | OUTPATIENT
Start: 2017-11-04 | End: 2017-11-05 | Stop reason: HOSPADM

## 2017-11-04 RX ORDER — MISOPROSTOL 200 UG/1
400 TABLET ORAL
Status: DISCONTINUED | OUTPATIENT
Start: 2017-11-04 | End: 2017-11-05 | Stop reason: HOSPADM

## 2017-11-04 RX ORDER — NALOXONE HYDROCHLORIDE 0.4 MG/ML
.1-.4 INJECTION, SOLUTION INTRAMUSCULAR; INTRAVENOUS; SUBCUTANEOUS
Status: DISCONTINUED | OUTPATIENT
Start: 2017-11-04 | End: 2017-11-05 | Stop reason: HOSPADM

## 2017-11-04 RX ORDER — CARBOPROST TROMETHAMINE 250 UG/ML
250 INJECTION, SOLUTION INTRAMUSCULAR
Status: DISCONTINUED | OUTPATIENT
Start: 2017-11-04 | End: 2017-11-04

## 2017-11-04 RX ADMIN — OXYTOCIN 10 UNITS: 10 INJECTION, SOLUTION INTRAMUSCULAR; INTRAVENOUS at 08:30

## 2017-11-04 RX ADMIN — IBUPROFEN 800 MG: 800 TABLET ORAL at 09:28

## 2017-11-04 RX ADMIN — PRENATAL VIT W/ FE FUMARATE-FA TAB 27-0.8 MG 1 TABLET: 27-0.8 TAB at 18:29

## 2017-11-04 RX ADMIN — SENNOSIDES AND DOCUSATE SODIUM 1 TABLET: 8.6; 5 TABLET ORAL at 19:46

## 2017-11-04 NOTE — PLAN OF CARE
Problem: Patient Care Overview  Goal: Plan of Care/Patient Progress Review  Outcome: Improving  Data: Pa Kev Garcia transferred to 7142 via wheelchair at 1125. Baby transferred via parent's arms.  Action: Receiving unit notified of transfer: Yes. Patient and family notified of room change. Report given to VANITA Grier at 11:25. Belongings sent to receiving unit. Accompanied by Registered Nurse. Oriented patient to surroundings. Call light within reach. ID bands double-checked with receiving RN.  Response: Patient tolerated transfer and is stable.

## 2017-11-04 NOTE — IP AVS SNAPSHOT
[facility]    [address] [address]    Phone:  [phone]                                       After Visit Summary   11/4/2017    Kd Garcia    MRN: [id number]           After Visit Summary Signature Page     I have received my discharge instructions, and my questions have been answered. I have discussed any challenges I see with this plan with the nurse or doctor.    ..........................................................................................................................................  Patient/Patient Representative Signature      ..........................................................................................................................................  Patient Representative Print Name and Relationship to Patient    ..................................................               ................................................  Date                                            Time    ..........................................................................................................................................  Reviewed by Signature/Title    ...................................................              ..............................................  Date                                                            Time

## 2017-11-04 NOTE — PROVIDER NOTIFICATION
"   11/04/17 1100   Provider Notification   Provider Name/Title Dr. Pardo   Method of Notification In Department   Request Evaluate-Remote   Notification Reason Status Update   Provider notified of instance of lightheadedness when up to void. Pt. Returned to bed, normotensive, no increase in lochia, slightly tachycardic (100's) juice and crackers given, pt reported feeling \"much better\" following food. Plan per provider to transfer to postpartum.   "

## 2017-11-04 NOTE — PROVIDER NOTIFICATION
11/04/17 1020   Provider Notification   Provider Name/Title Edvin   Method of Notification In Department   Request Evaluate-Remote   Notification Reason Other  (U tox request)   Provider notifed of pt receiving 2 U/S in 1st and 2nd trimester, but no prenatal care until approx 26 weeks. Provider declines need for u tox on mom and baby.

## 2017-11-04 NOTE — PLAN OF CARE
Problem: Postpartum (Vaginal Delivery) (Adult,Obstetrics,Pediatric)  Goal: Signs and Symptoms of Listed Potential Problems Will be Absent, Minimized or Managed (Postpartum)  Signs and symptoms of listed potential problems will be absent, minimized or managed by discharge/transition of care (reference Postpartum (Vaginal Delivery) (Adult,Obstetrics,Pediatric) CPG).   Outcome: Improving  Pa is doing well.  Pain is adequately controlled with ibuprofen.  Stable vitals signs, afebrile.   Good appetite.  Fundus firm at U/U with small flow. Breastfeeding well. Continue with plan of care.

## 2017-11-04 NOTE — PLAN OF CARE
Problem: Patient Care Overview  Goal: Plan of Care/Patient Progress Review  Outcome: Improving  Pt is doing well. Pain is well-controlled with Ibuprofen. No fevers. No history of foul-smelling vaginal discharge. Good appetite. Denies chest pain, shortness of breath, nausea or vomiting.  Fundus is U/U and bleeding is scant. Breastfeeding well.

## 2017-11-04 NOTE — H&P
[facility], [place]    History and Physical  Obstetrics and Gynecology     Date of Admission:  2017    Assessment & Plan   Kd Garcia is a 22 year old female who presents with spontaneous labor  ASSESSMENT:   IUP @ 40w0d in active labor.  NST reactive.  Category  I    PLAN:   Admit - see IP orders  Anticipate     Rupalluh HuffEmmajohn Sherman    History of Present Illness   Kd Garcia is a 22 year old female  40w0d  Estimated Date of Delivery: 17 is calculated from Patient's last menstrual period was 2017. is [facility]  in active labor.    PRENATAL COURSE  Prenatal course was essentially uncomplicated except for late prenatal care     Recent Labs   Lab Test  17   ABO  O   RH   Pos   AS  Neg     Rhogam not indicated   Recent Labs   Lab Test  10/09/17   1143  17   1525   HEPBANG   --   Nonreactive  Nonreactive   HIAGAB   --    --   Nonreactive   HIV-1 p24 Ag & HIV-1/HIV-2 Ab Not Detected     GBS  Negative   --    --    RUQIGG   --   7  8       Past Medical History    I have reviewed this patient's medical history and updated it with pertinent information if needed.   Past Medical History:   Diagnosis Date     Papanicolaou smear of cervix with atypical squamous cells of undetermined significance (ASC-US) 16       Past Surgical History   I have reviewed this patient's surgical history and updated it with pertinent information if needed.  Past Surgical History:   Procedure Laterality Date     HC OPEN RX DISTAL RADIUS FX, EXTRA-ARTICULAR  2008    left arm fx       Prior to Admission Medications   Prior to Admission Medications   Prescriptions Last Dose Informant Patient Reported? Taking?   Ferrous Sulfate (IRON SUPPLEMENT PO)   Yes No   Prenatal Vit-Fe Fumarate-FA (PRENATAL MULTIVITAMIN PLUS IRON) 27-0.8 MG TABS per tablet   No No   Sig: Take 1 tablet by mouth daily      Facility-Administered Medications: None      Allergies   No Known Allergies    Social History   I have reviewed this patient's social history and updated it with pertinent information if needed. Kd Garcia  reports that she has quit smoking. She has never used smokeless tobacco. She reports that she does not drink alcohol or use illicit drugs.    Family History   I have reviewed this patient's family history and updated it with pertinent information if needed.   Family History   Problem Relation Age of Onset     C.A.D. Father 70     MI     Hypertension Father        Immunization History   Influenza vaccination status: patient refused vaccination.    Physical Exam                      Vital Signs with Ranges       Abdomen: gravid, single vertex fetus, non-tender, EFW 7 lbs 2 ounces  Cervical Exam: 8/ 100/anterior lip/ soft/ 0    Fetal Heart Tones: 135 baseline, moderate variablility, + accels, no decels and Category I  TOCO:   frequency q 3 minutes    Constitutional: healthy, alert and active   Respiratory: no increased work of breathing  Cardiovascular: no edema  Skin/Extremites: normal skin color, texture, turgor  Neurologic: Awake, alert, oriented to name, place and time.    Neuropsychiatric: General: normal, calm and normal eye contact

## 2017-11-04 NOTE — L&D DELIVERY NOTE
Delivery Summary     Kd Garcia is a 22 year old  at 40w0d by 9w4d ultrasound who presented in active labor.  When she was initially checked she was found to be 8/100/0.  She progressed to complete and +2 at 0818 at which time she started pushing. No pain medications were given.  AROM with clear fluid was performed at 0821 and normal spontaneous vaginal delivery of vigorous female infant occurred at 0823 in the TIFFANIE position.  Infant was placed on maternal abdomen.  After one minute delay cord was clamped and cut.  Cord segment collected and cord blood collected.  Intact placenta with three vessel cord delivered intact with controlled cord traction.  10 units of IM pitocin administered.  Uterine fundus moderate tone on initial massage and improved with continued massage.  Perineum was inspected and found to be intact.  Superficial right labial laceration hemostatic.  QBL 615ml    Dr. Pardo present for entire delivery.    Mony Arciniega MD  OBGYN PGY3    I was present for delivery of viable female infant and placenta. No repair needed. No complications, stable condition.  Emma Pardo MD      Delivery Summary    Gestational Age:  Gestational Age: 40w0d     Maternal temperature range:  Temp  Av.6  F (36.4  C)  Min: 97.3  F (36.3  C)  Max: 97.9  F (36.6  C)    Membranes ruptured for:   0h 02m     GBS status:  Lab Results   Component Value Date    GBS Negative 10/09/2017       Antibiotic Status:  Antibiotics         IV Antibiotic Given         Additional Management      Fetal Status Prior to  Delivery Category 1    Fetal Status Comments         Sepsis Prebirth Score:       Sepsis Postbirth Score:       Determination based on clinical exam after birth:       Disposition:            Labor Event Times    Labor onset date:  17 Onset time:   4:00 AM   Dilation complete date:  17 Complete time:   8:18 AM   Start pushing date/time:  2017 0820            Labor Length    1st  Stage (hrs):  4 (min):  18   2nd Stage (hrs):  0 (min):  5   3rd Stage (hrs):  0 (min):  5      Labor Events     labor?:  No    steroids:  None   Labor Type:  Spontaneous   Predominate monitoring during 1st stage:  continuous electronic fetal monitoring      Antibiotics received during labor?:  No      Rupture identifier:  Rupture 1   Rupture date/time: 17 0821   Rupture type:  Artificial Rupture of Membranes   Fluid color:  Clear   Fluid odor:  Normal      1:1 continuous labor support provided by?:  RN Labor partogram used?:  no         Delivery/Placenta Date and Time    Delivery Date:  17 Delivery Time:   8:23 AM   Placenta Date/Time:  2017  8:28 AM   Oxytocin given at the time of delivery:  after delivery of placenta      Vaginal Counts    Initial count performed by 2 team members:   Two Team Members   VANITA Ramos Dr.          [place] [employer]   Initial counts 2  5    Added to count       Final counts 2  5       Placed during labor Accounted for at the end of labor   NA NA   NA NA   NA NA      Final count performed by 2 team members:   Two Team Members   VANITA Ramos Dr.         Final count correct?:  Yes         Apgars    Living status:  Living    1 Minute 5 Minute 10 Minute 15 Minute 20 Minute   Skin color: 0  1       Heart rate: 2  2       Reflex irritability: 2  2       Muscle tone: 2  2       Respiratory effort: 2  2       Total: 8  9          Apgars assigned by:  FRED WALDRON      Cord    Vessels:  3 Vessels Complications:  None   Cord Blood Disposition:  Lab Gases Sent?:  No         [place] Resuscitation    Methods:  None, Oximetry      [place] Care at Delivery:   baby girl.  Initial cry but color remained dusky.  Brought to the warmer ~ 2 in of age and placed pulse ox. 85% RA.  Dried and stimulated color pinked up, O2 to 92%   Output in Delivery Room:  Voided       Measurements    Weight:  7 lb Length:  1'  "7.75\"   Head circumference:  33 cm       Skin to Skin and Feeding Plan    Skin to skin initiation date/time: 11/4/17 0823   Skin to skin with:  Mother   Skin to skin end date/time: 11/4/17 0825      Breastfeeding initiated date/time:  11/4/2017 0900   How do you plan to feed your baby:  Breastfeeding      Labor Events and Shoulder Dystocia    Fetal Tracing Prior to Delivery:  Category 1   Shoulder dystocia present?:  Neg            Delivery (Maternal) (Provider to Complete) (941139)    Episiotomy:  None   Labial laceration:  right Repaired?:  No         Mother's Information  Mother: Kd Garcia [facility] #[id number]    Start of Mother's Information     IO Blood Loss  11/04/17 0400 - 11/04/17 1625    Mom's I/O Activity            End of Mother's Information  Mother: Kd Garcia [facility] #[id number]            Delivery - Provider to Complete ([id number])    Delivering clinician:  GRECIA ABRAMS   Attempted Delivery Types (Choose all that apply):  Spontaneous Vaginal Delivery   Delivery Type (Choose the 1 that will go to the Birth History):  Vaginal, Spontaneous Delivery                     Other personnel:   Provider Role   KOFI ARCINIEGA             Placenta    Delayed Cord Clamping:  Done   Date/Time:  11/4/2017  8:28 AM   Removal:  Spontaneous   Disposition:  Hospital disposal      Anesthesia    Method:  None         Presentation and Position    Presentation:  Vertex    Occiput Anterior                    Kofi Arciniega MD  "

## 2017-11-04 NOTE — PLAN OF CARE
Problem: Patient Care Overview  Goal: Plan of Care/Patient Progress Review  Outcome: Completed Date Met:  17  Vaginal Delivery Note   of viable Female with Dr. Pardo in attendance.  Nursery RN Davis present.  Infant with spontaneous cry, to mother's abdomen, dried and stimulated.  APGAR at 1 minute:  8 and APGAR at 5 minutes:  9.  Placenta delivered with out complication, 10 units of pitocin IM, no laceration, no repair, james cares provided.  Mother and baby in stable condition.

## 2017-11-04 NOTE — DISCHARGE SUMMARY
VAGINAL DELIVERY DISCHARGE SUMMARY    Admit date: 2017  Discharge date: 17    Admit Dx:   - 22 year old  at 40w0d  - spontaneous labor  -rubella non-immune  -late to prenatal care    Discharge Dx:  - Same as above, s/p     Procedures:  - Spontaneous vaginal delivery    Intrapartum Course:  Kd Garcia is a 22 year old  at 40w0d by 9w4d ultrasound who presented in active labor.  When she was initially checked she was found to be 8/100/0.  She progressed to complete and +2 at 0818 at which time she started pushing.  AROM with clear fluid was performed at 0821 and normal spontaneous vaginal delivery of vigorous female infant occurred at 0823 in the TIFFANIE position.  Infant was placed on maternal abdomen.  After one minute delay cord was clamped and cut.  Cord segment collected and cord blood collected.  Intact placenta with three vessel cord delivered intact with controlled cord traction.  10 units of IM pitocin administered.  Uterine fundus moderate tone on initial massage and improved with continued massage.  Perineum was inspected and found to be intact.  Superficial right labial laceration hemostatic.  QBL 615ml    Dr. Pardo present for entire delivery. Please see her admit H&P for full details of her PMH, PSH, Meds, Allergies and exam on admit.    Postpartum course:  Her postpartum course was uncomplicated. On PPD#1, she was meeting all of her postpartum goals and deemed stable for discharge. She was voiding without difficulty, tolerating a regular diet without nausea and vomiting, her pain was well controlled on oral pain medicines and her lochia was appropriate. Her hemoglobin prior to delivery was 13.3 and after delivery was 11.3. Her Rh status was positive, and Rhogam was not indicated. At the time of discharge, she was breast feeding her infant and desired condoms for contraception.    Discharge/Disposition:  Ms. Kd Garcia was discharged to home in stable condition with the  following instructions/medications:  - Call for temperature > 100.4, foul smelling vaginal discharge, bleeding > 1 pad per hour x 2 hrs, pain not controlled by oral pain meds, severe constipation or severe nausea or vomiting.  - She was instructed to follow-up with her primary OB in 6 weeks for a routine postpartum visit.  - She was instructed to continue her PNV on discharge if she wished to breast feed her infant.  - She was discharged home with the following medications:      Review of your medicines      START taking       Dose / Directions    acetaminophen 325 MG tablet   Commonly known as:  TYLENOL        Dose:  650 mg   Take 2 tablets (650 mg) by mouth every 4 hours as needed for mild pain or fever (greater than or equal to 38  C /100.4  F (oral) or 38.5  C/ 101.4  F (core).)   Quantity:  100 tablet   Refills:  0       ibuprofen 600 MG tablet   Commonly known as:  ADVIL/MOTRIN        Dose:  600 mg   Take 1 tablet (600 mg) by mouth every 6 hours as needed for moderate pain   Quantity:  60 tablet   Refills:  0       senna-docusate 8.6-50 MG per tablet   Commonly known as:  SENOKOT-S;PERICOLACE        Dose:  1-2 tablet   Take 1-2 tablets by mouth 2 times daily   Quantity:  100 tablet   Refills:  0         CONTINUE these medicines which have NOT CHANGED       Dose / Directions    IRON SUPPLEMENT PO        Refills:  0       prenatal multivitamin plus iron 27-0.8 MG Tabs per tablet   Used for:  Supervision of normal pregnancy        Dose:  1 tablet   Take 1 tablet by mouth daily   Quantity:  100 tablet   Refills:  3            Where to get your medicines      Some of these will need a paper prescription and others can be bought over the counter. Ask your nurse if you have questions.     You don't need a prescription for these medications      acetaminophen 325 MG tablet     ibuprofen 600 MG tablet     senna-docusate 8.6-50 MG per tablet             Mony Arciniega MD  OBGYN PGY3      Physician Attestation   I,  Rupal Sherman, personally examined and evaluated this patient.  I discussed the patient with the resident and care team, and agree with the assessment and plan of care as documented in the resident s note of 11/05/17  [date].      I personally reviewed vital signs, medications, labs and exam.    Key findings: Doing well. Declines rx for OTC meds. Plans condoms. RTC 6 weeks  Rupal Sherman  Date of Service (when I saw the patient): 11/05/17

## 2017-11-04 NOTE — TELEPHONE ENCOUNTER
"  Reason for Disposition    [1] History of prior delivery (multipara) AND [2] contractions < 10 minutes apart AND [3] present 1 hour    Additional Information    Negative: Passed out (i.e., lost consciousness, collapsed and was not responding)    Negative: Shock suspected (e.g., cold/pale/clammy skin, too weak to stand, low BP, rapid pulse)    Negative: Difficult to awaken or acting confused  (e.g., disoriented, slurred speech)    Negative: [1] SEVERE abdominal pain (e.g., excruciating) AND [2] constant AND [3] present > 1 hour    Negative: Severe bleeding (e.g., continuous red blood from vagina, or large blood clots)    Negative: Umbilical cord hanging out of the vagina (shiny, white, curled appearance, \"like telephone cord\")    Negative: Uncontrollable urge to push (i.e., feels like baby is coming out now)    Negative: Can see baby    Negative: Sounds like a life-threatening emergency to the triager    Negative: Pregnant < 37 weeks (i.e., )    Negative: [1] Uncertain delivery date AND [2] possibly pregnant < 37 weeks (i.e., )    Negative: [1] First baby (primipara) AND [2] contractions < 6 minutes apart  AND [3] present 2 hours    Answer Assessment - Initial Assessment Questions  1. ONSET: \"When did the symptoms begin?\"         0400  2. CONTRACTIONS: \"Describe the contractions that you are having.\" (e.g., duration, frequency, regularity, severity)      Every 5 minutes  3. NIKIA: \"What date are you expecting to deliver?\"      17  4. PARITY: \"Have you had a baby before?\" If yes, \"How long did the labor last?\"      second  5. FETAL MOVEMENT: \"Has the baby's movement decreased or changed significantly from normal?\"      ok  6. OTHER SYMPTOMS: \"Do you have any other symptoms?\" (e.g., leaking fluid from vagina, vaginal bleeding, fever, hand/facial swelling)      no    Protocols used: PREGNANCY - LABOR-ADULT-AH    "

## 2017-11-04 NOTE — IP AVS SNAPSHOT
MRN:[id number]                      After Visit Summary   11/4/2017    Kd Garcia    MRN: [id number]           Thank you!     Thank you for choosing [facility] for your care. Our goal is always to provide you with excellent care. Hearing back from our patients is one way we can continue to improve our services. Please take a few minutes to complete the written survey that you may receive in the mail after you visit with us. Thank you!        Patient Information     Date Of Birth          1995        About your hospital stay     You were admitted on:  November 4, 2017 [facility]    You were discharged on:  November 5, 2017        Reason for your hospital stay       Maternity care                  Who to Call     For medical emergencies, please call 911.  For non-urgent questions about your medical care, please call your primary care provider or clinic, None          Attending Provider     Provider Specialty    Rupal Sherman MD OB/Gyn    Emma Pardo MD OB/Gyn       Primary Care Provider    Physician No Ref-Primary      After Care Instructions     Activity       Review discharge instructions            Diet       Resume previous diet            Discharge Instructions - Postpartum visit       Schedule postpartum visit with your provider and return to clinic in 6 weeks.                  Follow-up Appointments     Follow Up and recommended labs and tests       Routine postpartum visit in 6 weeks.                  Your next 10 appointments already scheduled     Nov 07, 2017 10:45 AM CST   ESTABLISHED PRENATAL with Mely Farnsworth MD   [facility]    [address] [place] [id number] [phone]              Pending Results     No orders found for last 3 day(s).            Statement of Approval     Ordered          11/05/17 1030  I have reviewed and agree with all the  recommendations and orders detailed in this document.  EFFECTIVE NOW     Approved and electronically signed by:  Rupal Sherman MD           11/05/17 1031  I have reviewed and agree with all the recommendations and orders detailed in this document.  EFFECTIVE NOW     Approved and electronically signed by:  Rupal Sherman MD             Admission Information     Date & Time Provider Department Dept. Phone    11/4/2017 Emma Pardo MD [facility] [phone]      Your Vitals Were     Blood Pressure Temperature Respirations Last Period          108/68 98  F (36.7  C) (Oral) 16 01/21/2017        MyChart Information     et[facility]ches gives you secure access to your electronic health record. If you see a primary care provider, you can also send messages to your care team and make appointments. If you have questions, please call your primary care clinic.  If you do not have a primary care provider, please call [phone] and they will assist you.        Care EveryWhere ID     This is your Care EveryWhere ID. This could be used by other organizations to access your [place] medical records  [id number]        Equal Access to Services     [facility] HospitalFLAVIA : Hadii stephanie Samuel, waankurda yonas, qaybta kaalkomal barlow, astrid gibson. So [facility] [phone].    ATENCIÓN: Si habla español, tiene a garg disposición servicios gratuitos de asistencia lingüística. Mynor al [phone].    We comply with applicable federal civil rights laws and [place] laws. We do not discriminate on the basis of race, color, national origin, age, disability, sex, sexual orientation, or gender identity.               Review of your medicines      START taking        Dose / Directions    acetaminophen 325 MG tablet   Commonly known as:  TYLENOL        Dose:  650 mg   Take 2 tablets (650 mg) by mouth every 4 hours as needed for mild pain or fever (greater than or equal to 38? C /100.4? F  (oral) or 38.5? C/ 101.4? F (core).)   Quantity:  100 tablet   Refills:  0       ibuprofen 600 MG tablet   Commonly known as:  ADVIL/MOTRIN        Dose:  600 mg   Take 1 tablet (600 mg) by mouth every 6 hours as needed for moderate pain   Quantity:  60 tablet   Refills:  0       senna-docusate 8.6-50 MG per tablet   Commonly known as:  SENOKOT-S;PERICOLACE        Dose:  1-2 tablet   Take 1-2 tablets by mouth 2 times daily   Quantity:  100 tablet   Refills:  0         CONTINUE these medicines which have NOT CHANGED        Dose / Directions    IRON SUPPLEMENT PO        Refills:  0       prenatal multivitamin plus iron 27-0.8 MG Tabs per tablet   Used for:  Supervision of normal pregnancy        Dose:  1 tablet   Take 1 tablet by mouth daily   Quantity:  100 tablet   Refills:  3            Where to get your medicines      Some of these will need a paper prescription and others can be bought over the counter. Ask your nurse if you have questions.     You don't need a prescription for these medications     acetaminophen 325 MG tablet    ibuprofen 600 MG tablet    senna-docusate 8.6-50 MG per tablet                Protect others around you: Learn how to safely use, store and throw away your medicines at www.disposemymeds.org.             Medication List: This is a list of all your medications and when to take them. Check marks below indicate your daily home schedule. Keep this list as a reference.      Medications           Morning Afternoon Evening Bedtime As Needed    acetaminophen 325 MG tablet   Commonly known as:  TYLENOL   Take 2 tablets (650 mg) by mouth every 4 hours as needed for mild pain or fever (greater than or equal to 38? C /100.4? F (oral) or 38.5? C/ 101.4? F (core).)                                ibuprofen 600 MG tablet   Commonly known as:  ADVIL/MOTRIN   Take 1 tablet (600 mg) by mouth every 6 hours as needed for moderate pain   Last time this was given:  800 mg on 11/5/2017  2:38 AM                                 IRON SUPPLEMENT PO                                prenatal multivitamin plus iron 27-0.8 MG Tabs per tablet   Take 1 tablet by mouth daily   Last time this was given:  1 tablet on 11/5/2017  9:28 AM                                senna-docusate 8.6-50 MG per tablet   Commonly known as:  SENOKOT-S;PERICOLACE   Take 1-2 tablets by mouth 2 times daily   Last time this was given:  2 tablets on 11/5/2017  9:28 AM

## 2017-11-04 NOTE — PLAN OF CARE
Data: Patient admitted to room 479 at 0705. Patient is a . Prenatal record reviewed.   Obstetric History       T2      L2     SAB1   TAB0   Ectopic0   Multiple0   Live Births2       # Outcome Date GA Lbr Josh/2nd Weight Sex Delivery Anes PTL Lv   5 Term 17 40w0d 04:18 / 00:05 3.175 kg (7 lb) F Vag-Spont None N MARGARET      Name: HOWIEBABY1 PA      Apgar1:  8                Apgar5: 9   4 2016     SAB      3 SAB 2016     SAB      2 TAB 2015     TAB      1 Term 12 38w4d 05:42 / 00:09 2.92 kg (6 lb 7 oz) M Vag-Spont Local N MARGARET      Name: JUSTIN DENISE PA      Apgar1:  9                Apgar5: 9      .  Medical History:   Past Medical History:   Diagnosis Date     Papanicolaou smear of cervix with atypical squamous cells of undetermined significance (ASC-US) 16   .  Gestational age 40w0d. Vital signs per doc flowsheet. Fetal movement present. Patient reports No chief complaint on file.   as reason for admission. Support persons Foua present.  Action: Care of patient assumed at arrival. Verbal consent for EFM, external fetal monitors applied. Admission assessment completed. Patient and support persons educated on labor process. Patient instructed to report change in fetal movement, contractions, vaginal leaking of fluid or bleeding, abdominal pain, or any concerns related to the pregnancy to her nurse/physician. Patient oriented to room, call light in reach.   Response: Dr. Sherman informed of patient arrival, report of labor. Plan per provider is to admit patient. Patient verbalized understanding of education and verbalized agreement with plan. Patient coping with labor.

## 2017-11-05 VITALS — TEMPERATURE: 98 F | RESPIRATION RATE: 16 BRPM | SYSTOLIC BLOOD PRESSURE: 108 MMHG | DIASTOLIC BLOOD PRESSURE: 68 MMHG

## 2017-11-05 LAB — HGB BLD-MCNC: 11.3 G/DL (ref 11.7–15.7)

## 2017-11-05 PROCEDURE — 25000128 H RX IP 250 OP 636: Performed by: OBSTETRICS & GYNECOLOGY

## 2017-11-05 PROCEDURE — 85018 HEMOGLOBIN: CPT | Performed by: OBSTETRICS & GYNECOLOGY

## 2017-11-05 PROCEDURE — 90707 MMR VACCINE SC: CPT | Performed by: OBSTETRICS & GYNECOLOGY

## 2017-11-05 PROCEDURE — 25000132 ZZH RX MED GY IP 250 OP 250 PS 637: Performed by: OBSTETRICS & GYNECOLOGY

## 2017-11-05 PROCEDURE — 36415 COLL VENOUS BLD VENIPUNCTURE: CPT | Performed by: OBSTETRICS & GYNECOLOGY

## 2017-11-05 RX ORDER — ACETAMINOPHEN 325 MG/1
650 TABLET ORAL EVERY 4 HOURS PRN
Qty: 100 TABLET | Refills: 0 | COMMUNITY
Start: 2017-11-05 | End: 2018-08-24

## 2017-11-05 RX ORDER — AMOXICILLIN 250 MG
1-2 CAPSULE ORAL 2 TIMES DAILY
Qty: 100 TABLET | Refills: 0 | COMMUNITY
Start: 2017-11-05 | End: 2018-08-24

## 2017-11-05 RX ORDER — IBUPROFEN 600 MG/1
600 TABLET, FILM COATED ORAL EVERY 6 HOURS PRN
Qty: 60 TABLET | Refills: 0 | COMMUNITY
Start: 2017-11-05 | End: 2018-08-24

## 2017-11-05 RX ADMIN — IBUPROFEN 800 MG: 400 TABLET ORAL at 02:38

## 2017-11-05 RX ADMIN — SENNOSIDES AND DOCUSATE SODIUM 2 TABLET: 8.6; 5 TABLET ORAL at 09:28

## 2017-11-05 RX ADMIN — MEASLES, MUMPS, AND RUBELLA VIRUS VACCINE LIVE 0.5 ML: 1000; 12500; 1000 INJECTION, POWDER, LYOPHILIZED, FOR SUSPENSION SUBCUTANEOUS at 11:43

## 2017-11-05 RX ADMIN — PRENATAL VIT W/ FE FUMARATE-FA TAB 27-0.8 MG 1 TABLET: 27-0.8 TAB at 09:28

## 2017-11-05 NOTE — PLAN OF CARE
Data: Vital signs stable, assessments within normal limits.   Action: Review of care plan, teaching, and discharge instructions done with Patient. Infant and mother identification with ID bands done and verification with patient signature obtained.  Response: Patient states understanding and is comfortable with infant cares and feeding. Questions about postpartum and baby care addressed.  Patient discharged to boarding status.

## 2017-11-05 NOTE — PLAN OF CARE
Problem: Patient Care Overview  Goal: Plan of Care/Patient Progress Review  Outcome: Improving  Patient needs minimal assistance getting baby to achieve a deeper latch. Demonstrated to patient how to properly hand express after each breastfeed. Able to express about 0.2ml of colostrum. Patient requesting formula after multiple unsuccessful breastfeeds and continuing to show signs of hunger after each attempt. Patient given education about the importance of exclusively breastfeeding and healthy benefits associated with. Agreed to try using syringe vs bottle for formula. Fundus firm and midline, U2. No complaints of pain reported. Nurse in report relayed to me that utox and meconium not needed per labor rn and MD. Labor rn wrote in note that utox also not needed for patient and baby per Dr. Pardo. Vss

## 2017-11-05 NOTE — PROGRESS NOTES
Postpartum Progress Note  Kd Garcia  [id number]    Subjective:  Patient reports that she is feeling well this morning.  She is having minimal low abdominal cramping, which is improved with ibuprofen.  Lochia is less than a menses.  She is ambulating without dizziness, tolerating regular diet without nausea or vomiting, voiding without issues, and passing flatus.  She plans condoms for birth control.  She feels ready for discharge to home today.    Objective:  Vitals:    17 1135 17 1513 17 1944 17 0000   BP: 114/66 107/67 109/70 99/54   Resp: 18 18 18 16   Temp: 97.8  F (36.6  C) 97.6  F (36.4  C) 97.6  F (36.4  C) 98  F (36.7  C)   TempSrc: Oral Oral Oral Oral       I/O last 3 completed shifts:  In: -   Out: 496 [Blood:496]    General: awake, alert, answering questions appropriately, in no acute distress, comfortable  Heart: regular rate and rhythm  Lungs: clear to auscultation bilaterally  Abdomen: Soft, non-tender, non-distended; fundus firm and at 3cm below the level of the umbilicus  Extremities: none edema in BLE    Labs:  Hemoglobin   Date Value Ref Range Status   2017 11.3 (L) 11.7 - 15.7 g/dL Final     Hemoglobin   Date Value Ref Range Status   2017 11.3 (L) 11.7 - 15.7 g/dL Final   2017 13.3 11.7 - 15.7 g/dL Final       Assessment/Plan: 22 year old  who is postpartum day number 1 s/p .  Currently stable and doing well.    - Routine post-partum care.      Ambulating, voiding without difficulty.     Tolerating regular diet, passing flatus.  - Heme:    Hgb 13.3 > QBL 615ml> am Hgb  - Baby:     doing well  - Contraception:   condoms  - Rh positive, rhogam eval not indicated  - Rubella non-immune, MMR ordered     Dispo: discharge to home today    Mony Arciniega MD  OBGYN PGY3      Physician Attestation   I, Rupal Sherman, personally examined and evaluated this patient.  I discussed the patient with the resident and care team, and agree with the  assessment and plan of care as documented in the resident s note of 11/05/17  [date].      I personally reviewed vital signs, medications, labs and exam.    Key findings: Doing well. Declines rx for OTC meds. Plans condoms. RTC 6 weeks  Rupal Sherman  Date of Service (when I saw the patient): 11/05/17

## 2017-12-14 ENCOUNTER — OFFICE VISIT (OUTPATIENT)
Dept: FAMILY MEDICINE | Facility: CLINIC | Age: 22
End: 2017-12-14
Payer: COMMERCIAL

## 2017-12-14 VITALS
TEMPERATURE: 97.6 F | SYSTOLIC BLOOD PRESSURE: 120 MMHG | RESPIRATION RATE: 24 BRPM | HEART RATE: 72 BPM | DIASTOLIC BLOOD PRESSURE: 70 MMHG | HEIGHT: 59 IN | BODY MASS INDEX: 26.61 KG/M2 | WEIGHT: 132 LBS | OXYGEN SATURATION: 98 %

## 2017-12-14 DIAGNOSIS — L20.84 INTRINSIC ECZEMA: Primary | ICD-10-CM

## 2017-12-14 DIAGNOSIS — E66.3 OVERWEIGHT (BMI 25.0-29.9): ICD-10-CM

## 2017-12-14 PROCEDURE — 99214 OFFICE O/P EST MOD 30 MIN: CPT | Performed by: FAMILY MEDICINE

## 2017-12-14 RX ORDER — TRIAMCINOLONE ACETONIDE 1 MG/G
CREAM TOPICAL
Qty: 80 G | Refills: 0 | Status: SHIPPED | OUTPATIENT
Start: 2017-12-14 | End: 2019-01-15

## 2017-12-14 NOTE — PROGRESS NOTES
SUBJECTIVE:   Pa Kev Garcia is a 22 year old female who presents to clinic today for the following health issues:    Rash      Duration: x 1 month-onset 2 hrs post delivery started on RT index finger and now is spreading to Lt hand lat thenar eminence and other Rt fingers     Has had similar blisters on hands, lo in winter, since 12 y/o but in remission for some yrs     Son HAS eczema     Description  Location: Right Hand, Index Finger  Itching: severe    Intensity:  severe    Accompanying signs and symptoms: None    History (similar episodes/previous evaluation): None    Precipitating or alleviating factors:  New exposures:  None  Recent travel: no      Therapies tried and outcome: Aveeno and Vaseline/No Relief    OVERWEIGHT    -BMI=26.72  \-is one mo pp   -is nursing so will lose wt that way     ANEMIA     - Hgb low and now last mo to 11.4   -delivery in 11-17   -was low enough to requir FE po while pregnant     Problem list and histories reviewed & adjusted, as indicated.  Additional history: as documented    Labs reviewed in EPIC    [facility] and [facility]  Problems       Reviewed and updated as needed this visit by Provider         ROS:  C: NEGATIVE for fever, chills, change in weight  I: NEGATIVE for worrisome rashes, moles or lesions--blisters and eschars of hands   E: NEGATIVE for vision changes or irritation  E/M: NEGATIVE for ear, mouth and throat problems  R: NEGATIVE for significant cough or SOB  B: NEGATIVE for masses, tenderness or discharge  CV: NEGATIVE for chest pain, palpitations or peripheral edema  GI: NEGATIVE for nausea, abdominal pain, heartburn, or change in bowel habits  : NEGATIVE for frequency, dysuria, or hematuria  M: NEGATIVE for significant arthralgias or myalgia  N: NEGATIVE for weakness, dizziness or paresthesias  E: NEGATIVE for temperature intolerance, skin/hair changes  H: NEGATIVE for bleeding problems  P: NEGATIVE for  "changes in mood or affect    OBJECTIVE:     /70  Pulse 72  Temp 97.6  F (36.4  C) (Tympanic)  Resp 24  Ht 4' 11\" (1.499 m)  Wt 132 lb (59.9 kg)  LMP 01/21/2017  SpO2 98%  BMI 26.66 kg/m2  Body mass index is 26.66 kg/(m^2).  GENERAL: healthy, alert and no distress  EYES: Eyes grossly normal to inspection, PERRL and conjunctivae and sclerae normal  RESP: lungs clear to auscultation - no rales, rhonchi or wheezes  MS: no gross musculoskeletal defects noted, no edema  SKIN: no suspicious lesions or rashes--blisters and excoriated ulcers & eschars -mostly int of index finger but new blister on 4th finger on Rt and lat of hand on Lt   NEURO: Normal strength and tone, mentation intact and speech normal  PSYCH: mentation appears normal, affect normal/bright    Diagnostic Test Results:  none     ASSESSMENT/PLAN:               ICD-10-CM    1. Intrinsic eczema onset in winter since 12 y/o but in remission till recent delivery in 11-17 L20.84 triamcinolone (KENALOG) 0.1 % cream       Patient Instructions   1.  Weight Loss Tips  1. Do not eat after 6 hrs before your expected bedtime  2. Have your heaviest meal for breakfast, a slightly lighter meal at lunch and a snack 6 hrs before bed  3. No sugar/calorie drinks except milk ie no fruit juice, pop, alcohol.  4. Drink milk 30min before meals to decrease your hunger. Also it is excellent as part of your last meal of the day snack  5. Drink lots of water  6. Increase fiber in diet: all bran cereal, salads, popcorn etc  7. Have only one small serving of fruit a day about 1/2 cup (as this is high in sugar)  8. EXERCISE is the bottom line. Without it, you will gain weight even on a low calorie diet. Best if done 2-3X a day as can    Being overweight contributes to high blood pressure and high cholesterol, both of which cause heart attacks, strokes and kidney failure, prediabetes and diabetes, arthritis, and liver disease     2. No difference was  Noted by patients in a " double blind study when given codeine, tylenol ( acetaminophen) or ibuprofen (all in identical pills). They felt no difference in pain relief. Since ibuprofen and the NSAIDs  causes kidney damage, esophageal damage with heartburn, and can increase the risk of esophageal and stomach cancer and ulcers,and colonic strictures. They also cause increased risk of heart attack .   I recommend that you use tylenol(acetaminophen) for pain. Use the acetaminophen ES  Which has 500mgm/tablet You can take up to 2 tablets 4 times a day as need for pain.  If this is not enough, you can add in ibuprofen or aleve(naprosyn) with 2 glasses of fluid and some food-to protect the stomach and esophagus. Please let us know if you are continuing to take ibuprofen or aleve, as we will need to periodically check your kidney function with a blood test.    3.  Please eat iron in diet : beets, molasses ,  red meat and greens eg spinach   So you will raise your Hgb or iron will go up and you'll feel better     4.  Steroid creams   Over the counter hydrocortisone cream-may apply 2-4 X a day   Decreases red, itch and scaling     Cover  The steroid cream with vaseline(or vaseline  intensive care)   Then saran wrap and hold it on with a cut open  Sock or sleeve of a T-shirt or a shower cap if on the scalp     Weight management plan: Discussed healthy diet and exercise guidelines and patient will follow up in 3 months in clinic to re-evaluate.    counselled pt re the eczema and chronicity thereof. It may recede again, but with the + fam hx, may cont   Needs to Rx while there but is only a holding measure   Nelia Rothman MD  [facility]

## 2017-12-14 NOTE — MR AVS SNAPSHOT
After Visit Summary   12/14/2017    Pa Kev Garcia    MRN: [id number]           Patient Information     Date Of Birth          1995        Visit Information        Provider Department      12/14/2017 8:40 AM Nelia Rothman MD [facility]        Today's Diagnoses     Intrinsic eczema onset in winter since 12 y/o but in remission till recent delivery     -  1    Screening for malignant neoplasm of cervix        Need for HPV vaccine        Need for prophylactic vaccination and inoculation against influenza          Care Instructions    1.  Weight Loss Tips  1. Do not eat after 6 hrs before your expected bedtime  2. Have your heaviest meal for breakfast, a slightly lighter meal at lunch and a snack 6 hrs before bed  3. No sugar/calorie drinks except milk ie no fruit juice, pop, alcohol.  4. Drink milk 30min before meals to decrease your hunger. Also it is excellent as part of your last meal of the day snack  5. Drink lots of water  6. Increase fiber in diet: all bran cereal, salads, popcorn etc  7. Have only one small serving of fruit a day about 1/2 cup (as this is high in sugar)  8. EXERCISE is the bottom line. Without it, you will gain weight even on a low calorie diet. Best if done 2-3X a day as can    Being overweight contributes to high blood pressure and high cholesterol, both of which cause heart attacks, strokes and kidney failure, prediabetes and diabetes, arthritis, and liver disease     2. No difference was  Noted by patients in a double blind study when given codeine, tylenol ( acetaminophen) or ibuprofen (all in identical pills). They felt no difference in pain relief. Since ibuprofen and the NSAIDs  causes kidney damage, esophageal damage with heartburn, and can increase the risk of esophageal and stomach cancer and ulcers,and colonic strictures. They also cause increased risk of heart attack .   I recommend that you use tylenol(acetaminophen)  for pain. Use the acetaminophen ES  Which has 500mgm/tablet You can take up to 2 tablets 4 times a day as need for pain.  If this is not enough, you can add in ibuprofen or aleve(naprosyn) with 2 glasses of fluid and some food-to protect the stomach and esophagus. Please let us know if you are continuing to take ibuprofen or aleve, as we will need to periodically check your kidney function with a blood test.    3.  Please eat iron in diet : beets, molasses ,  red meat and greens eg spinach   So you will raise your Hgb or iron will go up and you'll feel better     4.  Steroid creams   Over the counter hydrocortisone cream-may apply 2-4 X a day   Decreases red, itch and scaling     Cover  The steroid cream with vaseline(or vaseline  intensive care)   Then saran wrap and hold it on with a cut open  Sock or sleeve of a T-shirt or a shower cap if on the scalp             Follow-ups after your visit        Your next 10 appointments already scheduled     Dec 18, 2017  1:45 PM CST   [facility] with Emma Pardo [place]   [facility]    [address] [place] [id number] [phone]              Who to contact     If you have questions or need follow up information about today's clinic visit or your schedule please contact [facility] directly at [phone].  Normal or non-critical lab and imaging results will be communicated to you by MyChart, letter or phone within 4 business days after the clinic has received the results. If you do not hear from us within 7 days, please contact the clinic through Sanivationhart or phone. If you have a critical or abnormal lab result, we will notify you by phone as soon as possible.  Submit refill requests through [employer] or call your pharmacy and they will forward the refill request to us. Please allow 3 business days for your refill to be completed.          Additional  "Information About Your Visit        MyChart Information     ERLinkhart gives you secure access to your electronic health record. If you see a primary care provider, you can also send messages to your care team and make appointments. If you have questions, please call your primary care clinic.  If you do not have a primary care provider, please call [phone] and they will assist you.        Care EveryWhere ID     This is your Care EveryWhere ID. This could be used by other organizations to access your [place] medical records  [id number]        Your Vitals Were     Pulse Temperature Respirations Height Last Period Pulse Oximetry    72 97.6  F (36.4  C) (Tympanic) 24 4' 11\" (1.499 m) 01/21/2017 98%    BMI (Body Mass Index)                   26.66 kg/m2            Blood Pressure from Last 3 Encounters:   12/14/17 120/70   11/05/17 108/68   11/01/17 104/68    Weight from Last 3 Encounters:   12/14/17 132 lb (59.9 kg)   11/01/17 153 lb 11.2 oz (69.7 kg)   10/24/17 152 lb 4.8 oz (69.1 kg)              Today, you had the following     No orders found for display       Primary Care Provider Fax #    Physician No Ref-Primary [phone]       No address on file        Equal Access to Services     DUONG DUNN : Hadii aad ku hadasho Soomaali, waaxda luqadaha, qaybta kaalmada adeegyada, waxay jaminin jeri proctor lasonal . So [facility] [phone].    ATENCIÓN: Si habla español, tiene a garg disposición servicios gratuitos de asistencia lingüística. Llame al [phone].    We comply with applicable federal civil rights laws and [place] laws. We do not discriminate on the basis of race, color, national origin, age, disability, sex, sexual orientation, or gender identity.            Thank you!     Thank you for choosing [facility]  for your care. Our goal is always to provide you with excellent care. Hearing back from our patients is one way we can continue to improve our services. Please " take a few minutes to complete the written survey that you may receive in the mail after your visit with us. Thank you!             Your Updated Medication List - Protect others around you: Learn how to safely use, store and throw away your medicines at www.disposemymeds.org.          This list is accurate as of: 17  9:15 AM.  Always use your most recent med list.                   Brand Name Dispense Instructions for use Diagnosis    acetaminophen 325 MG tablet    TYLENOL    100 tablet    Take 2 tablets (650 mg) by mouth every 4 hours as needed for mild pain or fever (greater than or equal to 38? C /100.4? F (oral) or 38.5? C/ 101.4? F (core).)     (normal spontaneous vaginal delivery)       ibuprofen 600 MG tablet    ADVIL/MOTRIN    60 tablet    Take 1 tablet (600 mg) by mouth every 6 hours as needed for moderate pain     (normal spontaneous vaginal delivery)       IRON SUPPLEMENT PO           prenatal multivitamin plus iron 27-0.8 MG Tabs per tablet     100 tablet    Take 1 tablet by mouth daily    Supervision of normal pregnancy       senna-docusate 8.6-50 MG per tablet    SENOKOT-S;PERICOLACE    100 tablet    Take 1-2 tablets by mouth 2 times daily     (normal spontaneous vaginal delivery)

## 2017-12-14 NOTE — PATIENT INSTRUCTIONS
1.  Weight Loss Tips  1. Do not eat after 6 hrs before your expected bedtime  2. Have your heaviest meal for breakfast, a slightly lighter meal at lunch and a snack 6 hrs before bed  3. No sugar/calorie drinks except milk ie no fruit juice, pop, alcohol.  4. Drink milk 30min before meals to decrease your hunger. Also it is excellent as part of your last meal of the day snack  5. Drink lots of water  6. Increase fiber in diet: all bran cereal, salads, popcorn etc  7. Have only one small serving of fruit a day about 1/2 cup (as this is high in sugar)  8. EXERCISE is the bottom line. Without it, you will gain weight even on a low calorie diet. Best if done 2-3X a day as can    Being overweight contributes to high blood pressure and high cholesterol, both of which cause heart attacks, strokes and kidney failure, prediabetes and diabetes, arthritis, and liver disease     2. No difference was  Noted by patients in a double blind study when given codeine, tylenol ( acetaminophen) or ibuprofen (all in identical pills). They felt no difference in pain relief. Since ibuprofen and the NSAIDs  causes kidney damage, esophageal damage with heartburn, and can increase the risk of esophageal and stomach cancer and ulcers,and colonic strictures. They also cause increased risk of heart attack .   I recommend that you use tylenol(acetaminophen) for pain. Use the acetaminophen ES  Which has 500mgm/tablet You can take up to 2 tablets 4 times a day as need for pain.  If this is not enough, you can add in ibuprofen or aleve(naprosyn) with 2 glasses of fluid and some food-to protect the stomach and esophagus. Please let us know if you are continuing to take ibuprofen or aleve, as we will need to periodically check your kidney function with a blood test.    3.  Please eat iron in diet : beets, molasses ,  red meat and greens eg spinach   So you will raise your Hgb or iron will go up and you'll feel better     4.  Steroid creams   Over the  counter hydrocortisone cream-may apply 2-4 X a day   Decreases red, itch and scaling     Cover  The steroid cream with vaseline(or vaseline  intensive care)   Then saran wrap and hold it on with a cut open  Sock or sleeve of a T-shirt or a shower cap if on the scalp

## 2017-12-14 NOTE — Clinical Note
Please abstract the following data from this visit with this patient into the appropriate field in Epic:  Pap smear done on this date: 03/2017 (approximately), by this group: NA, results were Negative.

## 2017-12-14 NOTE — NURSING NOTE
"Chief Complaint   Patient presents with     Derm Problem     /70  Pulse 72  Temp 97.6  F (36.4  C) (Tympanic)  Resp 24  Ht 4' 11\" (1.499 m)  Wt 132 lb (59.9 kg)  LMP 01/21/2017  SpO2 98%  BMI 26.66 kg/m2 Estimated body mass index is 26.66 kg/(m^2) as calculated from the following:    Height as of this encounter: 4' 11\" (1.499 m).    Weight as of this encounter: 132 lb (59.9 kg).  BP completed using cuff size: regular   Lawanda Dalal CMA    Health Maintenance Due   Topic Date Due     HPV IMMUNIZATION (1 of 3 - Female 3 Dose Series) 07/31/2005     PAP Q1 YR DIAGNOSTIC  05/25/2017     INFLUENZA VACCINE (SYSTEM ASSIGNED)  09/01/2017     Health Maintenance reviewed at today's visit patient asked to schedule/complete:   Cervical Cancer:  Patient agrees to schedule  Immunizations:  Patient agrees to schedule    "

## 2018-02-01 ENCOUNTER — TELEPHONE (OUTPATIENT)
Dept: PEDIATRICS | Facility: CLINIC | Age: 23
End: 2018-02-01

## 2018-02-01 DIAGNOSIS — Z91.89 BREASTFEEDING PROBLEM: Primary | ICD-10-CM

## 2018-02-01 RX ORDER — BREAST PUMP
1 EACH MISCELLANEOUS PRN
Qty: 1 EACH | Refills: 0 | Status: SHIPPED | OUTPATIENT
Start: 2018-02-01 | End: 2018-08-24

## 2018-04-07 ENCOUNTER — HEALTH MAINTENANCE LETTER (OUTPATIENT)
Age: 23
End: 2018-04-07

## 2018-08-22 DIAGNOSIS — O36.80X0 PREGNANCY WITH INCONCLUSIVE FETAL VIABILITY: Primary | ICD-10-CM

## 2018-08-22 NOTE — PROGRESS NOTES
Pt has NOB scheduled with ultrasound. Needed order to be placed. Future ultrasound order placed and linked with appt. Closing encounter.

## 2018-08-24 ENCOUNTER — RADIANT APPOINTMENT (OUTPATIENT)
Dept: ULTRASOUND IMAGING | Facility: CLINIC | Age: 23
End: 2018-08-24
Payer: COMMERCIAL

## 2018-08-24 ENCOUNTER — PRENATAL OFFICE VISIT (OUTPATIENT)
Dept: MIDWIFE SERVICES | Facility: CLINIC | Age: 23
End: 2018-08-24
Payer: COMMERCIAL

## 2018-08-24 VITALS — SYSTOLIC BLOOD PRESSURE: 102 MMHG | DIASTOLIC BLOOD PRESSURE: 68 MMHG | WEIGHT: 132 LBS | BODY MASS INDEX: 26.66 KG/M2

## 2018-08-24 DIAGNOSIS — O36.80X0 PREGNANCY WITH INCONCLUSIVE FETAL VIABILITY: ICD-10-CM

## 2018-08-24 DIAGNOSIS — Z32.00 UNCONFIRMED PREGNANCY: Primary | ICD-10-CM

## 2018-08-24 PROBLEM — Z23 NEED FOR TDAP VACCINATION: Status: RESOLVED | Noted: 2017-08-07 | Resolved: 2018-08-24

## 2018-08-24 PROBLEM — E66.3 OVERWEIGHT (BMI 25.0-29.9): Status: RESOLVED | Noted: 2017-12-14 | Resolved: 2018-08-24

## 2018-08-24 PROBLEM — Z36.89 ENCOUNTER FOR TRIAGE IN PREGNANT PATIENT: Status: RESOLVED | Noted: 2017-09-25 | Resolved: 2018-08-24

## 2018-08-24 PROBLEM — Z34.83 ENCOUNTER FOR SUPERVISION OF OTHER NORMAL PREGNANCY IN THIRD TRIMESTER: Status: RESOLVED | Noted: 2017-10-24 | Resolved: 2018-08-24

## 2018-08-24 PROBLEM — O09.30 LATE PRENATAL CARE: Status: RESOLVED | Noted: 2017-08-07 | Resolved: 2018-08-24

## 2018-08-24 LAB
ABO + RH BLD: NORMAL
ABO + RH BLD: NORMAL
SPECIMEN EXP DATE BLD: NORMAL

## 2018-08-24 PROCEDURE — 86901 BLOOD TYPING SEROLOGIC RH(D): CPT | Performed by: ADVANCED PRACTICE MIDWIFE

## 2018-08-24 PROCEDURE — 76817 TRANSVAGINAL US OBSTETRIC: CPT | Performed by: OBSTETRICS & GYNECOLOGY

## 2018-08-24 PROCEDURE — 84702 CHORIONIC GONADOTROPIN TEST: CPT | Performed by: ADVANCED PRACTICE MIDWIFE

## 2018-08-24 PROCEDURE — 99213 OFFICE O/P EST LOW 20 MIN: CPT | Performed by: ADVANCED PRACTICE MIDWIFE

## 2018-08-24 PROCEDURE — 36415 COLL VENOUS BLD VENIPUNCTURE: CPT | Performed by: ADVANCED PRACTICE MIDWIFE

## 2018-08-24 PROCEDURE — 86900 BLOOD TYPING SEROLOGIC ABO: CPT | Performed by: ADVANCED PRACTICE MIDWIFE

## 2018-08-24 NOTE — PROGRESS NOTES
Kd Garcia is a 23 year old co-habitating ko,  who presents for a new ob visit. Results of ultrasound are inconclusive for viable IUP.     Pa gave birth to her daughter 9 months ago. She had normal menses through 2018. She was then 3 weeks late on her menses, then started her period on ; it lasted for 7 days (very heavy for 3 days). On , she had light pink spotting x1 with wiping and brown spotting with wiping on .      she took a pregnancy test with a very faint line. Continued to take pregnancy tests with faint lines. On  and  the pregnancy tests had dark lines.            Patient's last menstrual period was 2018..           Current medications are:    Current Outpatient Prescriptions:      acetaminophen (TYLENOL) 325 MG tablet, Take 2 tablets (650 mg) by mouth every 4 hours as needed for mild pain or fever (greater than or equal to 38  C /100.4  F (oral) or 38.5  C/ 101.4  F (core).), Disp: 100 tablet, Rfl: 0     Ferrous Sulfate (IRON SUPPLEMENT PO), , Disp: , Rfl:      ibuprofen (ADVIL/MOTRIN) 600 MG tablet, Take 1 tablet (600 mg) by mouth every 6 hours as needed for moderate pain, Disp: 60 tablet, Rfl: 0     Misc. Devices (BREAST PUMP) MISC, 1 each as needed, Disp: 1 each, Rfl: 0     Prenatal Vit-Fe Fumarate-FA (PRENATAL MULTIVITAMIN PLUS IRON) 27-0.8 MG TABS per tablet, Take 1 tablet by mouth daily, Disp: 100 tablet, Rfl: 3     senna-docusate (SENOKOT-S;PERICOLACE) 8.6-50 MG per tablet, Take 1-2 tablets by mouth 2 times daily, Disp: 100 tablet, Rfl: 0     triamcinolone (KENALOG) 0.1 % cream, Apply sparingly to affected area three times daily as needed, Disp: 80 g, Rfl: 0     Advanced Maternal Age:  No    She  reports that she has quit smoking. She has never used smokeless tobacco.    Last PHQ-9 score on record =   PHQ-9 SCORE 2017   Total Score -   Total Score 1     Last GAD7 score on record = No flowsheet data found.  Alcohol Score =  0      PAST MEDICAL/SURGICAL HISTORY  Past Medical History:   Diagnosis Date     Papanicolaou smear of cervix with atypical squamous cells of undetermined significance (ASC-US) 5/25/16     Past Surgical History:   Procedure Laterality Date     HC OPEN RX DISTAL RADIUS FX, EXTRA-ARTICULAR  2008    left arm fx       FAMILY HISTORY  Family History   Problem Relation Age of Onset     Unknown/Adopted Mother      C.A.D. Father 70     MI     Hypertension Father      Diabetes No family hx of      Coronary Artery Disease No family hx of      Hyperlipidemia No family hx of      Cerebrovascular Disease No family hx of      Breast Cancer No family hx of      Colon Cancer No family hx of      Prostate Cancer No family hx of      Other Cancer No family hx of      Depression No family hx of      Anxiety Disorder No family hx of      Mental Illness No family hx of      Substance Abuse No family hx of      Anesthesia Reaction No family hx of      Asthma No family hx of      Osteoperosis No family hx of      Genetic Disorder No family hx of      Thyroid Disease No family hx of      Obesity No family hx of          ROS:  12 point review of systems negative other than symptoms noted below.      PHYSICAL EXAM  Vitals: /68  Wt 132 lb (59.9 kg)  LMP 07/03/2018  Breastfeeding? No  BMI 26.66 kg/m2  BMI= Body mass index is 26.66 kg/(m^2).     No other exam done today.      ASSESSMENT/PLAN:   Unconfirmed pregnancy      ICD-10-CM    1. Unconfirmed pregnancy Z32.00 HCG Quantitative Pregnancy, Blood (ASB552)     ABO and Rh       Results for orders placed or performed during the hospital encounter of 11/04/17   Anti Treponema   Result Value Ref Range    Treponema pallidum Antibody Negative NEG^Negative   Hemoglobin   Result Value Ref Range    Hemoglobin 13.3 11.7 - 15.7 g/dL   Platelet count   Result Value Ref Range    Platelet Count 241 150 - 450 10e9/L   Hemoglobin   Result Value Ref Range    Hemoglobin 11.3 (L) 11.7 - 15.7 g/dL    ABO/Rh type and screen   Result Value Ref Range    ABO O     RH(D) Pos     Antibody Screen Neg     Test Valid Only At          [facility]    Specimen Expires 11/07/2017      Preliminary ultrasound results show a retroverted uterus with 2 small anechoic areas in endo 1= 0.4x 0.5x 0.2 cm, 2= 0.3x 0.4x 0.3 cm.     We discussed the possibility of this being (1) a missed AB (2) a pregnancy too early to date. Due to her home pregnancy tests showing increasingly darker positive lines in recent days, I am inclined to believe that this is an early pregnancy. We discussed starting a prenatal vitamin, ensuring medications taken are compatible with pregnancy, and abstaining from alcohol. Additionally I explained there is a small chance that this may be a multiple pregnancy. 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 I explained that if this is a pregnancy and it is not the right time for it, I can give her resources to discuss her options in more detail.     Plan: (1) draw quant HCG today and MyChart results. She is aware that these results are not conclusive at this time. (2) lab only appointment next week at her convenience for another quant HCG to determine SAB verses viable IUP. (3) Ultrasound in 2 weeks to determine dating/viable IUP.     Discussed s/sx to be aware of relating to SAB and ectopic pregnancy. She states understanding and will call CNM with concerns.     Elisabeth Lozano, DNP, APRN, CNM

## 2018-08-24 NOTE — MR AVS SNAPSHOT
After Visit Summary   8/24/2018    Pa Kev Garcia    MRN: [id number]           Patient Information     Date Of Birth          1995        Visit Information        Provider Department      8/24/2018 10:30 AM Elisabeth Lozano APRN CNM; WE TRIAGE [facility]        Today's Diagnoses     Unconfirmed pregnancy    -  1       Follow-ups after your visit        Follow-up notes from your care team     Return in about 2 weeks (around 9/7/2018) for Ultrasound.      Your next 10 appointments already scheduled     Aug 28, 2018  9:00 AM CDT   LAB with [facility]   [facility] ([facility])    [address]  [place] [address]   [phone]           Please do not eat 10-12 hours before your appointment if you are coming in fasting for labs on lipids, cholesterol, or glucose (sugar). This does not apply to pregnant women. Water, hot tea and black coffee (with nothing added) are okay. Do not drink other fluids, diet soda or chew gum.            Sep 07, 2018 11:10 AM CDT   US OB TRANSVAGINAL ONLY with WEUS1   [facility] ([facility])    [address]  [place] [address]   [phone]           Please bring a list of your medicines (including vitamins, minerals and over-the-counter drugs). Also, tell your doctor about any allergies you may have. Wear comfortable clothes and leave your valuables at home.  Drink four 8-ounce glasses of fluid an hour before your exam. If you need to empty your bladder before your exam, try to release only a little urine. Then, drink another glass of fluid.  You may have up to two family members in the exam room. If you bring a small child, an adult must be there to care for him or her. No video or camera photography during the procedure.  Please call the Imaging Department at your exam site with any questions.            Sep 07, 2018  11:40 AM CDT   ESTABLISHED PRENATAL with PETER Avilez CNM   [facility] ([facility])    [address] [place] [address] [address]  [place] [address] [phone]              Who to contact     If you have questions or need follow up information about today's clinic visit or your schedule please contact [facility] directly at [phone].  Normal or non-critical lab and imaging results will be communicated to you by Light[employer] by LEDhart, letter or phone within 4 business days after the clinic has received the results. If you do not hear from us within 7 days, please contact the clinic through Light[employer] LEDhart or phone. If you have a critical or abnormal lab result, we will notify you by phone as soon as possible.  Submit refill requests through App[employer] or call your pharmacy and they will forward the refill request to us. Please allow 3 business days for your refill to be completed.          Additional Information About Your Visit        Appointuit Information     App[employer] gives you secure access to your electronic health record. If you see a primary care provider, you can also send messages to your care team and make appointments. If you have questions, please call your primary care clinic.  If you do not have a primary care provider, please call [phone] and they will assist you.        Care EveryWhere ID     This is your Care EveryWhere ID. This could be used by other organizations to access your [place] medical records  [id number]        Your Vitals Were     Last Period Breastfeeding? BMI (Body Mass Index)             07/03/2018 No 26.66 kg/m2          Blood Pressure from Last 3 Encounters:   08/24/18 102/68   12/14/17 120/70   11/05/17 108/68    Weight from Last 3 Encounters:   08/24/18 132 lb (59.9 kg)   12/14/17 132 lb (59.9 kg)   11/01/17 153 lb 11.2 oz (69.7 kg)              We Performed the Following     ABO and Rh     HCG Quantitative Pregnancy,  Blood ([id number])        Primary Care Provider Fax #    Physician No Ref-Primary [phone]       No address on file        Equal Access to Services     DUONG DUNN : Hadii aad ku hadirina Samuel, brittany brookben, eron barlow, astrid gamble So [facility] [phone].    ATENCIÓN: Si habla español, tiene a garg disposición servicios gratuitos de asistencia lingüística. Llame al [phone].    We comply with applicable federal civil rights laws and [place] laws. We do not discriminate on the basis of race, color, national origin, age, disability, sex, sexual orientation, or gender identity.            Thank you!     Thank you for choosing [facility]  for your care. Our goal is always to provide you with excellent care. Hearing back from our patients is one way we can continue to improve our services. Please take a few minutes to complete the written survey that you may receive in the mail after your visit with us. Thank you!             Your Updated Medication List - Protect others around you: Learn how to safely use, store and throw away your medicines at www.disposemymeds.org.          This list is accurate as of 18 10:59 AM.  Always use your most recent med list.                   Brand Name Dispense Instructions for use Diagnosis    acetaminophen 325 MG tablet    TYLENOL    100 tablet    Take 2 tablets (650 mg) by mouth every 4 hours as needed for mild pain or fever (greater than or equal to 38? C /100.4? F (oral) or 38.5? C/ 101.4? F (core).)     (normal spontaneous vaginal delivery)       breast pump Misc     1 each    1 each as needed    Breastfeeding problem       ibuprofen 600 MG tablet    ADVIL/MOTRIN    60 tablet    Take 1 tablet (600 mg) by mouth every 6 hours as needed for moderate pain     (normal spontaneous vaginal delivery)       IRON SUPPLEMENT PO           prenatal multivitamin plus iron 27-0.8 MG Tabs per tablet     100  tablet    Take 1 tablet by mouth daily    Supervision of normal pregnancy       senna-docusate 8.6-50 MG per tablet    SENOKOT-S;PERICOLACE    100 tablet    Take 1-2 tablets by mouth 2 times daily     (normal spontaneous vaginal delivery)       triamcinolone 0.1 % cream    KENALOG    80 g    Apply sparingly to affected area three times daily as needed    Intrinsic eczema

## 2018-08-25 LAB — B-HCG SERPL-ACNC: <1 IU/L (ref 0–5)

## 2018-08-27 ENCOUNTER — MYC MEDICAL ADVICE (OUTPATIENT)
Dept: MIDWIFE SERVICES | Facility: CLINIC | Age: 23
End: 2018-08-27

## 2018-11-07 ENCOUNTER — ALLIED HEALTH/NURSE VISIT (OUTPATIENT)
Dept: NURSING | Facility: CLINIC | Age: 23
End: 2018-11-07
Payer: COMMERCIAL

## 2018-11-07 DIAGNOSIS — Z23 NEED FOR PROPHYLACTIC VACCINATION AND INOCULATION AGAINST INFLUENZA: Primary | ICD-10-CM

## 2018-11-07 PROCEDURE — 99207 ZZC NO CHARGE NURSE ONLY: CPT

## 2018-11-07 PROCEDURE — 90686 IIV4 VACC NO PRSV 0.5 ML IM: CPT

## 2018-11-07 PROCEDURE — 90471 IMMUNIZATION ADMIN: CPT

## 2018-11-07 NOTE — MR AVS SNAPSHOT
After Visit Summary   11/7/2018    Pa Kev Garcia    MRN: [id number]           Patient Information     Date Of Birth          1995        Visit Information        Provider Department      11/7/2018 1:10 PM CARE COORDINATOR [facility]        Today's Diagnoses     Need for prophylactic vaccination and inoculation against influenza    -  1       Follow-ups after your visit        Who to contact     If you have questions or need follow up information about today's clinic visit or your schedule please contact [facility] directly at [phone].  Normal or non-critical lab and imaging results will be communicated to you by [email or web address], letter or phone within 4 business days after the clinic has received the results. If you do not hear from us within 7 days, please contact the clinic through [employer] or phone. If you have a critical or abnormal lab result, we will notify you by phone as soon as possible.  Submit refill requests through [employer] or call your pharmacy and they will forward the refill request to us. Please allow 3 business days for your refill to be completed.          Additional Information About Your Visit        MyChart Information     [employer] gives you secure access to your electronic health record. If you see a primary care provider, you can also send messages to your care team and make appointments. If you have questions, please call your primary care clinic.  If you do not have a primary care provider, please call [phone] and they will assist you.        Care EveryWhere ID     This is your Care EveryWhere ID. This could be used by other organizations to access your [place] medical records  [id number]         Blood Pressure from Last 3 Encounters:   08/24/18 102/68   12/14/17 120/70   11/05/17 108/68    Weight from Last 3 Encounters:   08/24/18 132 lb (59.9 kg)   12/14/17 132 lb (59.9 kg)   11/01/17 153 lb 11.2 oz (69.7 kg)               We Performed the Following     FLU VACCINE, SPLIT VIRUS, IM (QUADRIVALENT) [45377]- >3 YRS     Vaccine Administration, Initial [42102]        Primary Care Provider Fax #    Physician No Ref-Primary [phone]       No address on file        Equal Access to Services     DUONG DUNN : Raymond encarnacion fanny Saumel, waankurda luqadaha, brendata kaalkomal barlow, astrid jaminin hayaaru martinkarol barberroberto gibson. So [facility] [phone].    ATENCIÓN: Si habla español, tiene a garg disposición servicios gratuitos de asistencia lingüística. Llame al [phone].    We comply with applicable federal civil rights laws and [place] laws. We do not discriminate on the basis of race, color, national origin, age, disability, sex, sexual orientation, or gender identity.            Thank you!     Thank you for choosing [facility]  for your care. Our goal is always to provide you with excellent care. Hearing back from our patients is one way we can continue to improve our services. Please take a few minutes to complete the written survey that you may receive in the mail after your visit with us. Thank you!             Your Updated Medication List - Protect others around you: Learn how to safely use, store and throw away your medicines at www.disposemymeds.org.          This list is accurate as of 11/7/18  2:25 PM.  Always use your most recent med list.                   Brand Name Dispense Instructions for use Diagnosis    prenatal multivitamin plus iron 27-0.8 MG Tabs per tablet     100 tablet    Take 1 tablet by mouth daily    Supervision of normal pregnancy       triamcinolone 0.1 % cream    KENALOG    80 g    Apply sparingly to affected area three times daily as needed    Intrinsic eczema

## 2019-01-15 ENCOUNTER — PRENATAL OFFICE VISIT (OUTPATIENT)
Dept: MIDWIFE SERVICES | Facility: CLINIC | Age: 24
End: 2019-01-15
Payer: COMMERCIAL

## 2019-01-15 ENCOUNTER — ANCILLARY PROCEDURE (OUTPATIENT)
Dept: ULTRASOUND IMAGING | Facility: CLINIC | Age: 24
End: 2019-01-15
Payer: COMMERCIAL

## 2019-01-15 VITALS
BODY MASS INDEX: 25.8 KG/M2 | SYSTOLIC BLOOD PRESSURE: 129 MMHG | HEIGHT: 60 IN | WEIGHT: 131.4 LBS | HEART RATE: 89 BPM | DIASTOLIC BLOOD PRESSURE: 87 MMHG

## 2019-01-15 DIAGNOSIS — Z32.00 UNCONFIRMED PREGNANCY: ICD-10-CM

## 2019-01-15 DIAGNOSIS — Z11.3 ENCOUNTER FOR SCREENING EXAMINATION FOR SEXUALLY TRANSMITTED DISEASE: ICD-10-CM

## 2019-01-15 DIAGNOSIS — Z34.81 SUPERVISION OF NORMAL INTRAUTERINE PREGNANCY IN MULTIGRAVIDA IN FIRST TRIMESTER: Primary | ICD-10-CM

## 2019-01-15 PROBLEM — L20.84 INTRINSIC ECZEMA: Status: RESOLVED | Noted: 2017-12-14 | Resolved: 2019-01-15

## 2019-01-15 PROBLEM — Z34.80 SUPERVISION OF NORMAL INTRAUTERINE PREGNANCY IN MULTIGRAVIDA: Status: ACTIVE | Noted: 2019-01-15

## 2019-01-15 LAB
ABO + RH BLD: NORMAL
ABO + RH BLD: NORMAL
ALBUMIN UR-MCNC: NEGATIVE MG/DL
APPEARANCE UR: CLEAR
BILIRUB UR QL STRIP: NEGATIVE
BLD GP AB SCN SERPL QL: NORMAL
BLOOD BANK CMNT PATIENT-IMP: NORMAL
COLOR UR AUTO: YELLOW
ERYTHROCYTE [DISTWIDTH] IN BLOOD BY AUTOMATED COUNT: 12.2 % (ref 10–15)
GLUCOSE UR STRIP-MCNC: NEGATIVE MG/DL
HCT VFR BLD AUTO: 40 % (ref 35–47)
HGB BLD-MCNC: 13.4 G/DL (ref 11.7–15.7)
HGB UR QL STRIP: NEGATIVE
KETONES UR STRIP-MCNC: NEGATIVE MG/DL
LEUKOCYTE ESTERASE UR QL STRIP: NEGATIVE
MCH RBC QN AUTO: 30.3 PG (ref 26.5–33)
MCHC RBC AUTO-ENTMCNC: 33.5 G/DL (ref 31.5–36.5)
MCV RBC AUTO: 91 FL (ref 78–100)
NITRATE UR QL: NEGATIVE
PH UR STRIP: 5.5 PH (ref 5–7)
PLATELET # BLD AUTO: 327 10E9/L (ref 150–450)
RBC # BLD AUTO: 4.42 10E12/L (ref 3.8–5.2)
SOURCE: NORMAL
SP GR UR STRIP: >1.03 (ref 1–1.03)
SPECIMEN EXP DATE BLD: NORMAL
TSH SERPL DL<=0.005 MIU/L-ACNC: 0.66 MU/L (ref 0.4–4)
UROBILINOGEN UR STRIP-ACNC: 0.2 EU/DL (ref 0.2–1)
WBC # BLD AUTO: 10.9 10E9/L (ref 4–11)

## 2019-01-15 PROCEDURE — 99207 ZZC FIRST OB VISIT: CPT | Performed by: ADVANCED PRACTICE MIDWIFE

## 2019-01-15 PROCEDURE — 99000 SPECIMEN HANDLING OFFICE-LAB: CPT | Performed by: ADVANCED PRACTICE MIDWIFE

## 2019-01-15 PROCEDURE — 85027 COMPLETE CBC AUTOMATED: CPT | Performed by: ADVANCED PRACTICE MIDWIFE

## 2019-01-15 PROCEDURE — 87340 HEPATITIS B SURFACE AG IA: CPT | Performed by: ADVANCED PRACTICE MIDWIFE

## 2019-01-15 PROCEDURE — 86901 BLOOD TYPING SEROLOGIC RH(D): CPT | Performed by: ADVANCED PRACTICE MIDWIFE

## 2019-01-15 PROCEDURE — 87389 HIV-1 AG W/HIV-1&-2 AB AG IA: CPT | Performed by: ADVANCED PRACTICE MIDWIFE

## 2019-01-15 PROCEDURE — 86762 RUBELLA ANTIBODY: CPT | Performed by: ADVANCED PRACTICE MIDWIFE

## 2019-01-15 PROCEDURE — 86780 TREPONEMA PALLIDUM: CPT | Performed by: ADVANCED PRACTICE MIDWIFE

## 2019-01-15 PROCEDURE — 84443 ASSAY THYROID STIM HORMONE: CPT | Performed by: ADVANCED PRACTICE MIDWIFE

## 2019-01-15 PROCEDURE — 87591 N.GONORRHOEAE DNA AMP PROB: CPT | Performed by: ADVANCED PRACTICE MIDWIFE

## 2019-01-15 PROCEDURE — 86900 BLOOD TYPING SEROLOGIC ABO: CPT | Performed by: ADVANCED PRACTICE MIDWIFE

## 2019-01-15 PROCEDURE — 87086 URINE CULTURE/COLONY COUNT: CPT | Performed by: ADVANCED PRACTICE MIDWIFE

## 2019-01-15 PROCEDURE — 36415 COLL VENOUS BLD VENIPUNCTURE: CPT | Performed by: ADVANCED PRACTICE MIDWIFE

## 2019-01-15 PROCEDURE — 40000791 ZZHCL STATISTIC VERIFI PRENATAL TRISOMY 21,18,13: Mod: 90 | Performed by: ADVANCED PRACTICE MIDWIFE

## 2019-01-15 PROCEDURE — 87491 CHLMYD TRACH DNA AMP PROBE: CPT | Performed by: ADVANCED PRACTICE MIDWIFE

## 2019-01-15 PROCEDURE — 81003 URINALYSIS AUTO W/O SCOPE: CPT | Performed by: ADVANCED PRACTICE MIDWIFE

## 2019-01-15 PROCEDURE — 86850 RBC ANTIBODY SCREEN: CPT | Performed by: ADVANCED PRACTICE MIDWIFE

## 2019-01-15 PROCEDURE — 76817 TRANSVAGINAL US OBSTETRIC: CPT | Performed by: OBSTETRICS & GYNECOLOGY

## 2019-01-15 ASSESSMENT — ANXIETY QUESTIONNAIRES
6. BECOMING EASILY ANNOYED OR IRRITABLE: SEVERAL DAYS
2. NOT BEING ABLE TO STOP OR CONTROL WORRYING: NOT AT ALL
5. BEING SO RESTLESS THAT IT IS HARD TO SIT STILL: NOT AT ALL
IF YOU CHECKED OFF ANY PROBLEMS ON THIS QUESTIONNAIRE, HOW DIFFICULT HAVE THESE PROBLEMS MADE IT FOR YOU TO DO YOUR WORK, TAKE CARE OF THINGS AT HOME, OR GET ALONG WITH OTHER PEOPLE: NOT DIFFICULT AT ALL
1. FEELING NERVOUS, ANXIOUS, OR ON EDGE: NOT AT ALL
3. WORRYING TOO MUCH ABOUT DIFFERENT THINGS: NOT AT ALL

## 2019-01-15 ASSESSMENT — MIFFLIN-ST. JEOR: SCORE: 1267.53

## 2019-01-15 ASSESSMENT — PATIENT HEALTH QUESTIONNAIRE - PHQ9
SUM OF ALL RESPONSES TO PHQ QUESTIONS 1-9: 8
5. POOR APPETITE OR OVEREATING: NOT AT ALL

## 2019-01-15 NOTE — PROGRESS NOTES
SUBJECTIVE:     HPI:    This is a 23 year old female patient,  who presents for her first obstetrical visit. She had her last two baby's at [place] with MDs, she reports no problems with either pregnancy or delivery, she also had 2 SABs, one missed at 14 weeks, and one early. Her 7 yo is in 1st grade and her daughter is 2 yo.     NIKIA: 2019, by Last Menstrual Period.  She is 10w0d weeks.  Her cycles are irregular.  Her last menstrual period was normal.   Since her LMP, she has experienced  nausea, emesis, fatigue, headache, loss of appetite, lightheadedness, constipation and Weight Loss, Dizziness).   She denies abdominal pain, vaginal discharge, dysuria, pelvic pain, urinary urgency, urinary frequency, vaginal bleeding and hemorrhoids.    Additional History:     Have you travelled during the pregnancy?No  Have your sexual partner(s) travelled during the pregnancy?No      HISTORY:   Planned Pregnancy: Yes  Marital Status: Single  Occupation: Student CMA  Living in Household: Significant Other and Children    Past History:  Her past medical history   Past Medical History:   Diagnosis Date     Intrinsic eczema onset in winter since 12 y/o but in remission till recent delivery in 2017     Papanicolaou smear of cervix with atypical squamous cells of undetermined significance (ASC-US) 16   .      She has a history of  none    Since her last LMP she denies use of alcohol, tobacco and street drugs.    Past medical, surgical, social and family history were reviewed and updated in [facility].    OB HISTORY  Obstetric History       T2      L2     SAB2   TAB1   Ectopic0   Multiple0   Live Births2       # Outcome Date GA Lbr Josh/2nd Weight Sex Delivery Anes PTL Lv   6 Current            5 Term 17 40w0d 04:18 / 00:05 3.175 kg (7 lb) F Vag-Spont None N MARGARET      Name: HOWIE,BABY1 PA      Apgar1:  8                Apgar5: 9   4 SAB 2016     SAB      3 SAB 2016     SAB      2 TAB 2015      TAB      1 Term 08/22/12 38w4d 05:42 / 00:09 2.92 kg (6 lb 7 oz) M Vag-Spont Local N MARGARET      Name: JUSTIN DENISE PA      Apgar1:  9                Apgar5: 9          History of GDM: No,  PTL : No,  History of HTN in pregnancy: No,  Thrombocytopenia: No,  Shoulder dystocia: No,  Vacuum Extraction: No  PPH: No   3rd of 4th degree laceration: No.   Other complications: No    PERSONAL HISTORY  Exercise Habits:  none irregularly days per week.  Employment: Student.  Her job involves light activity with no potential for toxic exposure.    Travel plans:  are none planned.   Diet: eats regular meals  Abuse concerns? No  Hgb A1c screen:  BMI > 30: No, 1st degree family DM: No, History of GDM: No, PCOS: No, High risk ethnicity: No          Current Outpatient Medications   Medication     Prenatal Vit-Fe Fumarate-FA (PRENATAL MULTIVITAMIN PLUS IRON) 27-0.8 MG TABS per tablet     No current facility-administered medications for this visit.        ROS:   12 point review of systems negative other than symptoms noted below.  Constitutional: Fatigue, Loss of Appetite and Weight Loss  Head: none  Cardiovascular: Lightheadedness  Gastrointestinal: Constipation, Nausea and Vomiting  Neurologic: Dizziness and Headaches      OBJECTIVE:     EXAM:  /87 (BP Location: Left arm, Patient Position: Sitting, Cuff Size: Adult Regular)   Pulse 89   Ht 1.524 m (5')   Wt 59.6 kg (131 lb 6.4 oz)   LMP 11/06/2018   Breastfeeding? No   BMI 25.66 kg/m   Body mass index is 25.66 kg/m .    GENERAL: healthy, alert and no distress  EYES: Eyes grossly normal to inspection, PERRL and conjunctivae and sclerae normal  HENT: ear canals and TM's normal, nose and mouth without ulcers or lesions  NECK: no adenopathy, no asymmetry, masses, or scars and thyroid normal to palpation  RESP: lungs clear to auscultation - no rales, rhonchi or wheezes  BREAST: normal without masses, tenderness or nipple discharge and no palpable axillary masses or adenopathy  CV:  regular rate and rhythm, normal S1 S2, no S3 or S4, no murmur, click or rub, no peripheral edema and peripheral pulses strong  ABDOMEN: soft, nontender, no hepatosplenomegaly, no masses and bowel sounds normal  MS: no gross musculoskeletal defects noted, no edema  SKIN: no suspicious lesions or rashes  NEURO: Normal strength and tone, mentation intact and speech normal  PSYCH: mentation appears normal, affect normal/bright  PELVIC EXAM:  Vulva: No external lesions, BUS WNL  Vagina: Moist, pink, discharge normal  well rugated, no lesions  Cervix:anterior, smooth, pink, no visible lesions, neg CMT, GC swab obtained  Uterus: Normal size, anteverted, non-tender, mobile  Ovaries: No mass, non-tender  Rectal exam: deferred      ASSESSMENT/PLAN:       ICD-10-CM    1. Supervision of normal intrauterine pregnancy in multigravida in first trimester Z34.81 ABO/Rh type and screen     Hepatitis B surface antigen     CBC with platelets     HIV Antigen Antibody Combo     Rubella Antibody IgG Quantitative     Treponema Abs w Reflex to RPR and Titer     Urine Culture Aerobic Bacterial     *UA reflex to Microscopic     Non Invasive Prenatal Test Cell Free DNA     TSH with free T4 reflex   2. Encounter for screening examination for sexually transmitted disease Z11.3 NEISSERIA GONORRHOEA PCR     CHLAMYDIA TRACHOMATIS PCR       23 year old , 10w0d weeks of pregnancy with NIKIA of 2019, by Last Menstrual Period    Discussed as follows:  Need for pap PP as patient declined today, history of ascus pap at 20, negative in 2017, missed pap in 2018  Discussed preparent in addition to innatal, patient declines  Reviewed midwifery care    Counseling given:   - Follow up in 4-6 weeks for return OB visit.  - Recommended weight gain for pregnancy: 15-25 lbs.  -Discussed smaller frequent meals and increasing hydration to help with dizziness and double vision, follow up if it persists  COUNSELING    Instructed on use of triage nurse line  and contacting the on call CNM after hours in an emergency.      Reviewed CNM philosophy, call schedule for labor and delivery, and FSH for delivery    1st OB handout given outlining appointment spacing and CNM information    Reviewed exercise and nutrition    Will call patient with lab results when available       PLAN/PATIENT INSTRUCTIONS:  1st OB handout given  Meet the midwife information provided      PETER Manzo CNM    Prenatal OB Questionnaire      Allergies as of 1/15/2019:    Allergies as of 01/15/2019     (No Known Allergies)       Current medications are:  Current Outpatient Medications   Medication Sig Dispense Refill     Prenatal Vit-Fe Fumarate-FA (PRENATAL MULTIVITAMIN PLUS IRON) 27-0.8 MG TABS per tablet Take 1 tablet by mouth daily (Patient not taking: Reported on 1/15/2019) 100 tablet 3         Early ultrasound screening tool:    Does patient have irregular periods?  Yes  Did patient use hormonal birth control in the three months prior to positive urine pregnancy test? No  Is the patient breastfeeding?  Yes  Is the patient 10 weeks or greater at time of education visit?  Yes

## 2019-01-16 LAB
BACTERIA SPEC CULT: NO GROWTH
C TRACH DNA SPEC QL NAA+PROBE: NEGATIVE
HBV SURFACE AG SERPL QL IA: NONREACTIVE
HIV 1+2 AB+HIV1 P24 AG SERPL QL IA: NONREACTIVE
Lab: NORMAL
N GONORRHOEA DNA SPEC QL NAA+PROBE: NEGATIVE
RUBV IGG SERPL IA-ACNC: 19 IU/ML
SPECIMEN SOURCE: NORMAL
T PALLIDUM AB SER QL: NONREACTIVE

## 2019-01-24 ENCOUNTER — TELEPHONE (OUTPATIENT)
Dept: MIDWIFE SERVICES | Facility: CLINIC | Age: 24
End: 2019-01-24

## 2019-01-24 NOTE — TELEPHONE ENCOUNTER
Innatal results: negative    TEST RESULT INTERPRETATION   Chromosome 21 No aneuploidy detected  Results consistent with two copies of chromosome 21   Chromosome 18 No aneuploidy detected  Results consistent with two copies of chromosome 18   Chromosome 13 No aneuploidy detected  Results consistent with two copies of chromosome 13   Sex Chromosome No aneuploidy detected  Results consistent with two sex chromosomes:  female     Called pt with results. Gender revealed to pt over the phone per her request.  Pt verbalized understanding and no further questions.

## 2019-01-24 NOTE — TELEPHONE ENCOUNTER
Attempted to call pt with no answer or vm.  Results are still pending. Will call her when results come in.

## 2019-01-30 ENCOUNTER — MYC MEDICAL ADVICE (OUTPATIENT)
Dept: MIDWIFE SERVICES | Facility: CLINIC | Age: 24
End: 2019-01-30

## 2019-02-05 LAB — NON INVASIVE PRENATAL TEST CELL FREE DNA: NORMAL

## 2019-02-11 ENCOUNTER — NURSE TRIAGE (OUTPATIENT)
Dept: NURSING | Facility: CLINIC | Age: 24
End: 2019-02-11

## 2019-02-11 ENCOUNTER — TELEPHONE (OUTPATIENT)
Dept: MIDWIFE SERVICES | Facility: CLINIC | Age: 24
End: 2019-02-11

## 2019-02-11 ENCOUNTER — HOSPITAL ENCOUNTER (EMERGENCY)
Facility: CLINIC | Age: 24
Discharge: HOME | End: 2019-02-11
Attending: EMERGENCY MEDICINE | Admitting: EMERGENCY MEDICINE
Payer: COMMERCIAL

## 2019-02-11 VITALS
WEIGHT: 132 LBS | OXYGEN SATURATION: 96 % | HEART RATE: 94 BPM | SYSTOLIC BLOOD PRESSURE: 92 MMHG | TEMPERATURE: 99.1 F | DIASTOLIC BLOOD PRESSURE: 58 MMHG | HEIGHT: 60 IN | BODY MASS INDEX: 25.91 KG/M2 | RESPIRATION RATE: 18 BRPM

## 2019-02-11 DIAGNOSIS — R19.7 VOMITING AND DIARRHEA: ICD-10-CM

## 2019-02-11 DIAGNOSIS — R11.10 VOMITING AND DIARRHEA: ICD-10-CM

## 2019-02-11 DIAGNOSIS — B34.9 VIRAL SYNDROME: ICD-10-CM

## 2019-02-11 LAB
ALBUMIN SERPL-MCNC: 3.6 G/DL (ref 3.4–5)
ALBUMIN UR-MCNC: 10 MG/DL
ALP SERPL-CCNC: 69 U/L (ref 40–150)
ALT SERPL W P-5'-P-CCNC: 17 U/L (ref 0–50)
ANION GAP SERPL CALCULATED.3IONS-SCNC: 11 MMOL/L (ref 3–14)
APPEARANCE UR: CLEAR
AST SERPL W P-5'-P-CCNC: 14 U/L (ref 0–45)
BASOPHILS # BLD AUTO: 0 10E9/L (ref 0–0.2)
BASOPHILS NFR BLD AUTO: 0.1 %
BILIRUB SERPL-MCNC: 0.5 MG/DL (ref 0.2–1.3)
BILIRUB UR QL STRIP: NEGATIVE
BUN SERPL-MCNC: 9 MG/DL (ref 7–30)
CALCIUM SERPL-MCNC: 9 MG/DL (ref 8.5–10.1)
CHLORIDE SERPL-SCNC: 103 MMOL/L (ref 94–109)
CO2 SERPL-SCNC: 21 MMOL/L (ref 20–32)
COLOR UR AUTO: YELLOW
CREAT SERPL-MCNC: 0.46 MG/DL (ref 0.52–1.04)
DIFFERENTIAL METHOD BLD: ABNORMAL
EOSINOPHIL # BLD AUTO: 0 10E9/L (ref 0–0.7)
EOSINOPHIL NFR BLD AUTO: 0 %
ERYTHROCYTE [DISTWIDTH] IN BLOOD BY AUTOMATED COUNT: 12.6 % (ref 10–15)
FLUAV+FLUBV AG SPEC QL: NEGATIVE
FLUAV+FLUBV AG SPEC QL: NEGATIVE
GFR SERPL CREATININE-BSD FRML MDRD: >90 ML/MIN/{1.73_M2}
GLUCOSE SERPL-MCNC: 77 MG/DL (ref 70–99)
GLUCOSE UR STRIP-MCNC: NEGATIVE MG/DL
HCT VFR BLD AUTO: 37.3 % (ref 35–47)
HGB BLD-MCNC: 12.9 G/DL (ref 11.7–15.7)
HGB UR QL STRIP: ABNORMAL
IMM GRANULOCYTES # BLD: 0.1 10E9/L (ref 0–0.4)
IMM GRANULOCYTES NFR BLD: 0.5 %
KETONES UR STRIP-MCNC: 80 MG/DL
LEUKOCYTE ESTERASE UR QL STRIP: NEGATIVE
LYMPHOCYTES # BLD AUTO: 0.5 10E9/L (ref 0.8–5.3)
LYMPHOCYTES NFR BLD AUTO: 3.7 %
MCH RBC QN AUTO: 30.5 PG (ref 26.5–33)
MCHC RBC AUTO-ENTMCNC: 34.6 G/DL (ref 31.5–36.5)
MCV RBC AUTO: 88 FL (ref 78–100)
MONOCYTES # BLD AUTO: 0.6 10E9/L (ref 0–1.3)
MONOCYTES NFR BLD AUTO: 4.3 %
MUCOUS THREADS #/AREA URNS LPF: PRESENT /LPF
NEUTROPHILS # BLD AUTO: 13.1 10E9/L (ref 1.6–8.3)
NEUTROPHILS NFR BLD AUTO: 91.4 %
NITRATE UR QL: NEGATIVE
NRBC # BLD AUTO: 0 10*3/UL
NRBC BLD AUTO-RTO: 0 /100
PH UR STRIP: 6 PH (ref 5–7)
PLATELET # BLD AUTO: 254 10E9/L (ref 150–450)
POTASSIUM SERPL-SCNC: 3.2 MMOL/L (ref 3.4–5.3)
PROT SERPL-MCNC: 7.6 G/DL (ref 6.8–8.8)
RBC # BLD AUTO: 4.23 10E12/L (ref 3.8–5.2)
RBC #/AREA URNS AUTO: <1 /HPF (ref 0–2)
SODIUM SERPL-SCNC: 135 MMOL/L (ref 133–144)
SOURCE: ABNORMAL
SP GR UR STRIP: 1.02 (ref 1–1.03)
SPECIMEN SOURCE: NORMAL
SQUAMOUS #/AREA URNS AUTO: 1 /HPF (ref 0–1)
UROBILINOGEN UR STRIP-MCNC: NORMAL MG/DL (ref 0–2)
WBC # BLD AUTO: 14.3 10E9/L (ref 4–11)
WBC #/AREA URNS AUTO: 1 /HPF (ref 0–5)

## 2019-02-11 PROCEDURE — 96375 TX/PRO/DX INJ NEW DRUG ADDON: CPT

## 2019-02-11 PROCEDURE — 99285 EMERGENCY DEPT VISIT HI MDM: CPT | Mod: 25

## 2019-02-11 PROCEDURE — 25000132 ZZH RX MED GY IP 250 OP 250 PS 637: Performed by: EMERGENCY MEDICINE

## 2019-02-11 PROCEDURE — 96361 HYDRATE IV INFUSION ADD-ON: CPT

## 2019-02-11 PROCEDURE — 25800030 ZZH RX IP 258 OP 636: Performed by: EMERGENCY MEDICINE

## 2019-02-11 PROCEDURE — 80053 COMPREHEN METABOLIC PANEL: CPT | Performed by: EMERGENCY MEDICINE

## 2019-02-11 PROCEDURE — 25000128 H RX IP 250 OP 636: Performed by: EMERGENCY MEDICINE

## 2019-02-11 PROCEDURE — 81001 URINALYSIS AUTO W/SCOPE: CPT | Performed by: EMERGENCY MEDICINE

## 2019-02-11 PROCEDURE — 87804 INFLUENZA ASSAY W/OPTIC: CPT | Performed by: EMERGENCY MEDICINE

## 2019-02-11 PROCEDURE — 25000128 H RX IP 250 OP 636

## 2019-02-11 PROCEDURE — 96374 THER/PROPH/DIAG INJ IV PUSH: CPT

## 2019-02-11 PROCEDURE — 85025 COMPLETE CBC W/AUTO DIFF WBC: CPT | Performed by: EMERGENCY MEDICINE

## 2019-02-11 PROCEDURE — 76705 ECHO EXAM OF ABDOMEN: CPT

## 2019-02-11 RX ORDER — ACETAMINOPHEN 500 MG
1000 TABLET ORAL ONCE
Status: COMPLETED | OUTPATIENT
Start: 2019-02-11 | End: 2019-02-11

## 2019-02-11 RX ORDER — DIPHENHYDRAMINE HYDROCHLORIDE 50 MG/ML
INJECTION INTRAMUSCULAR; INTRAVENOUS
Status: COMPLETED
Start: 2019-02-11 | End: 2019-02-11

## 2019-02-11 RX ORDER — DIPHENHYDRAMINE HYDROCHLORIDE 50 MG/ML
25 INJECTION INTRAMUSCULAR; INTRAVENOUS ONCE
Status: COMPLETED | OUTPATIENT
Start: 2019-02-11 | End: 2019-02-11

## 2019-02-11 RX ORDER — METOCLOPRAMIDE HYDROCHLORIDE 5 MG/ML
10 INJECTION INTRAMUSCULAR; INTRAVENOUS ONCE
Status: COMPLETED | OUTPATIENT
Start: 2019-02-11 | End: 2019-02-11

## 2019-02-11 RX ADMIN — METOCLOPRAMIDE 10 MG: 5 INJECTION, SOLUTION INTRAMUSCULAR; INTRAVENOUS at 19:41

## 2019-02-11 RX ADMIN — DIPHENHYDRAMINE HYDROCHLORIDE 25 MG: 50 INJECTION INTRAMUSCULAR; INTRAVENOUS at 20:24

## 2019-02-11 RX ADMIN — ACETAMINOPHEN 1000 MG: 500 TABLET, FILM COATED ORAL at 19:38

## 2019-02-11 RX ADMIN — SODIUM CHLORIDE, POTASSIUM CHLORIDE, SODIUM LACTATE AND CALCIUM CHLORIDE 1000 ML: 600; 310; 30; 20 INJECTION, SOLUTION INTRAVENOUS at 19:38

## 2019-02-11 ASSESSMENT — ENCOUNTER SYMPTOMS
ABDOMINAL PAIN: 1
COUGH: 0
DIARRHEA: 1
NAUSEA: 1
VOMITING: 1
HEADACHES: 1
HEMATURIA: 0
DYSURIA: 0
CHILLS: 1
FEVER: 1
FREQUENCY: 0
LIGHT-HEADEDNESS: 1

## 2019-02-11 ASSESSMENT — MIFFLIN-ST. JEOR: SCORE: 1270.25

## 2019-02-11 NOTE — TELEPHONE ENCOUNTER
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   Spoke with triage at OB Gyn earlier and advised to be seen in ED for  new or worsening symptoms   Triage protocol reviewed   advised to proceed to ED for worsening symptoms   Understands and will comply   Bee Maloney RN  FNA        Reason for Disposition    [1] Fever > 100.5 F (38.1 C) AND [2] NO cold symptoms (e.g., runny nose, cough)    Additional Information    Negative: Shock suspected (e.g., cold/pale/clammy skin, too weak to stand, low BP, rapid pulse)    Negative: Difficult to awaken or acting confused  (e.g., disoriented, slurred speech)    Negative: Rash with purple (or blood-colored) spots or dots    Negative: Sounds like a life-threatening emergency to the triager    Negative: [1] Abdominal pain AND [2] pregnant > 20 weeks    Negative: [1] Abdominal pain AND [2] pregnant < 20 weeks    Negative: Fever onset within 24 hours of receiving vaccine    Negative: Other symptom is present, see that guideline  (e.g., sore throat, earache, diarrhea, vomiting)    Negative: [1] Headache AND [2] stiff neck (can't touch chin to chest)    Negative: Difficulty breathing    Negative: IV drug abuse    Negative: Fever > 104 F (40 C)    Negative: [1] Fever > 100.5 F (38.1 C) AND [2] indwelling urinary catheter (e.g., Mckeon, Coude)    Negative: [1] Fever > 100.5 F (38.1 C) AND [2] diabetes mellitus or weak immune system (e.g., HIV positive, splenectomy, organ transplant, chronic steroids)    Negative: Painful urination or increased frequency of urination    Negative: [1] Drinking very little AND [2] dehydration suspected (e.g., no urine > 12 hours, very dry mouth, very lightheaded)    Negative: Patient sounds very sick or weak to the triager    Negative: Having contractions or other symptoms of labor    Negative: Leakage of fluid  from vagina    Negative: [1] Pregnant 23 or more weeks AND [2] baby is moving less today (e.g., kick count < 5 in 1 hour or < 10 in 2 hours)    Negative: [1] Fever > 100.5 F (38.1 C) AND [2] foreign travel to a developing country in the last month    Protocols used: PREGNANCY - FEVER-ADULT-AH

## 2019-02-11 NOTE — ED AVS SNAPSHOT
Emergency Department  [address] [place] [id number]  Phone:  [phone]  Fax:  [phone]                                    Kd Garcia   MRN: [id number]    Department:   Emergency Department   Date of Visit:  2/11/2019           After Visit Summary Signature Page    I have received my discharge instructions, and my questions have been answered. I have discussed any challenges I see with this plan with the nurse or doctor.    ..........................................................................................................................................  Patient/Patient Representative Signature      ..........................................................................................................................................  Patient Representative Print Name and Relationship to Patient    ..................................................               ................................................  Date                                   Time    ..........................................................................................................................................  Reviewed by Signature/Title    ...................................................              ..............................................  Date                                               Time          22EPIC Rev 08/18

## 2019-02-11 NOTE — LETTER
February 11, 2019      To Whom It May Concern:      Pa Kev Garcia was seen in our Emergency Department today, 02/11/19.  I expect her condition to improve over the next 2-3 days.  She may return to work/school when improved.    Sincerely,        Eligio Andersen MD         Dear ,   [facility] process is that all ordered disciplines will be involved in the development of the plan of care.  The following disciplines were unable to see Supriya Herr; MRN [id number] within the 5 day evaluation window.  There will be a delay in the evalation visit by  PT  OT    We will notify you when the evaluation is completed.  The patient has been notified.      Sincerely [facility]  Frances Vogel  [phone]

## 2019-02-11 NOTE — TELEPHONE ENCOUNTER
@ 13w6d  Pt woke this morning with a bad HA  Took Tylenol and then went to clinicals  HA continues. Slight nausea and dizzy  Going home and feels safe driving.  Encouraged pt to go home, drink lots of fluids, try a caffeine beverage, start taking ES Tylenol 1000 mg every 6 hours until HA gone, rest, and even try sleeping aide such as Unisom for sleep to get a good rest.  Seek ER care if pain intensifies and intolerable or call tomorrow if continues.  Pt verbalized understanding and no further questions.  Grecia Dixon RN on 2019 at 4:46 PM

## 2019-02-12 NOTE — DISCHARGE INSTRUCTIONS
Discharge Instructions  Gastroenteritis    You have been seen today for vomiting (throwing up) and diarrhea (loose stools), called gastroenteritis or the stomach flu. This is usually caused by a virus, but some bacteria, parasites, medicines or other medical conditions can cause similar symptoms. At this time your provider does not find that your vomiting and diarrhea is a sign of anything dangerous or life-threatening.  However, sometimes the signs of serious illness do not show up right away. Remember that serious problems like appendicitis can look like gastroenteritis at first.       Generally, every Emergency Department visit should have a follow-up clinic visit with either a primary or a specialty clinic/provider. Please follow-up as instructed by your emergency provider today.    Return to the Emergency Department if:  You keep vomiting and you are not able to keep liquids down.  You feel you are getting dehydrated, such as being very thirsty, not urinating (peeing), or feeling faint or lightheaded.   You develop a new fever.  You have abdominal (belly) pain that seems worse than cramps, is in one spot, or is getting worse over time.   You have blood in your vomit or in your diarrhea.  You feel very weak.    What can I do to help myself?  The most important thing to do is to drink clear liquids.  If you have been vomiting a lot, it is best to have only small, frequent sips of liquids.  Drinking too much at once may cause more vomiting. Water is a good first option for rehydration. If you are vomiting often, you must also replace electrolytes (salts and minerals) lost with your illness. Pedialyte  is the best rehydration liquid but many don?t like the taste so sports drinks (like Gatorade ) are a good option. Sodas and juice are also options but are high in sugar. Avoid acid liquids (orange), caffeine (coffee) or alcohol. Do not drink milk until you no longer have diarrhea.  After liquids are staying down, you  may start eating mild foods. Soda crackers, toast, plain noodles, gelatin, applesauce and bananas are good first choices.  Avoid foods that have acid, are spicy, fatty or fibrous (such as meats, coarse grains, vegetables). You may start eating these foods again in about 3 days when you are better.  Sometimes treatment includes prescription medicine to prevent nausea (sick to your stomach) and vomiting and to prevent diarrhea. If your provider prescribes these for you, take them as directed.  Nonprescription medicine is available for the treatment of diarrhea and can be very effective.  If you use it, make sure you use the dose recommended on the package. Avoid Lomotil . Check with your healthcare provider before you use any medicine for diarrhea.  Do not take ibuprofen, or other nonsteroidal anti-inflammatory medicines without checking with your healthcare provider.  If you were given a prescription for medicine here today, be sure to read all of the information (including the package insert) that comes with your prescription.  This will include important information about the medicine, its side effects, and any warnings that you need to know about.  The pharmacist who fills the prescription can provide more information and answer questions you may have about the medicine.  If you have questions or concerns that the pharmacist cannot address, please call or return to the Emergency Department.   Remember that you can always come back to the Emergency Department if you are not able to see your regular provider in the amount of time listed above, if you get any new symptoms, or if there is anything that worries you.

## 2019-02-12 NOTE — ED PROVIDER NOTES
History     Chief Complaint:  Fever     HPI   Pa Kev Garcia is a  approximately 14 weeks pregnant 23 year old female who presents for evaluation of a fever. This morning the patient started to develop a headache. She took 500 mg of Tylenol around 0800 with some improvement of her headache. This afternoon, she started to develop nausea, had an episode of vomiting, and started to develop some cramping abdominal pain. Since then, she has had two episodes of diarrhea, vomiting and has developed lightheadedness. Tonight, she started to develop a fever and chills, prompting her to seek evaluation in the ED. Currently in the ED, she rates her pain at a severity of 8/10. Otherwise, she has not had any recent cough, dysuria, hematuria, urinary frequency, vaginal discharge, or vaginal bleeding. She did get a flu shot this year.     Allergies:  NKDA      Medications:    Prenatal multivitamin plus iron     Past Medical History:    The patient denies any relevant past medical history.     Past Surgical History:    Open rx left distal radius, extra-articular     Family History:    CAD - Father   Hypertension - Father   Cerebrovascular disease - Father     Social History:  Tobacco use:    Former smoker   Alcohol use:    Negative   Marital status:    Single   Accompanied to ED by:  Family      Review of Systems   Constitutional: Positive for chills and fever.   Respiratory: Negative for cough.    Gastrointestinal: Positive for abdominal pain, diarrhea, nausea and vomiting.   Genitourinary: Negative for dysuria, frequency, hematuria, vaginal bleeding and vaginal discharge.   Neurological: Positive for light-headedness and headaches.   All other systems reviewed and are negative.      Physical Exam   First Vitals:  BP: 115/67  Pulse: 105  Heart Rate: 138  Temp: 101  F (38.3  C)  Resp: 18  Height: 152.4 cm (5')  Weight: 59.9 kg (132 lb)  SpO2: 96 %      Physical Exam  General: Well appearing, nontoxic. Resting  comfortably  Head:  Scalp, face, and head appear normal  Eyes:  Pupils are equal, round, and reactive to light    Conjunctivae non-injected and sclerae white  ENT:    The external nose is normal    Pinnae are normal. Bilateral TMs clear without erythema, bulging, or effusion. Auditory canals normal.     The oropharynx is normal, mucous membranes moist. Posterior pharynx clear without swelling, exudates or erythema     Uvula is in the midline  Neck:  Normal range of motion    There is no rigidity noted. No meningismus.    Trachea is in the midline  CV:  Tachycardic rate, regular rhythm      Normal S1/S2, no S3/S4    No murmur or rub. Radial pulses 2+ bilaterally   Resp:  Lungs are clear and equal bilaterally    There is no tachypnea    No increased work of breathing    No rales, wheezing, or rhonchi  GI:  Abdomen is soft, no rigidity or guarding    No distension, or mass. Gravid uterus palpable just above the pelvic brim.    No tenderness or rebound tenderness   MS:  Normal muscular tone    Symmetric motor strength    No lower extremity edema  Skin:  No rash or acute skin lesions noted  Neuro: Awake and alert, oriented x3.     Speech is normal and fluent    Moves all extremities spontaneously  Psych:  Normal affect.  Appropriate interactions.      Emergency Department Course     Laboratory:  CBC: WBC 14.3 high, o/w WNL (HGB 12.9, )   CMP: Potassium 3.2 low, Creatinine 0.46 low, o/w WNL   UA reflex to Microscopic and Culture: Ketones 80, Trace blood, Protein albumin 10, Mucous present, o/w Negative   Influenza A/B antigen: A negative, B negative     Procedures:  Results for orders placed during the hospital encounter of 02/11/19   POC US OB TRANSABDOMINAL LIMITED    Impression Limited Bedside Transabdominal ultrasound for evaluation of IUP        Performed any interpreted by me.    Indication:  abdominal pain  Findings:  The lower abdomen was interrogated with a curvilinear probe. The uterus was identified.    Within the uterus there is a moving fetus with visible heart rate with FHR of 182. No free peritoneal fluid visualized.    Impression: Intrauterine pregnancy, with normal fetal movement and high-normal fetal heart rate.     Interventions:  1938 Lactated ringers 1,000 mL IV   1938 Tylenol 1,000 mg PO   1941 Reglan 10 mg IV   2024 Benadryl 25 mg IV     Emergency Department Course:  Nursing notes and vitals reviewed.  1910: I performed an exam of the patient as documented above.     1926  A POC US OB Transabdominal was performed as outlined in the procedure note above.  The patient tolerated well and there were no complications.     2055: I updated and reassessed the patient. She is feeling improved.     Findings and plan explained to the Patient. Patient discharged home with instructions regarding supportive care, medications, and reasons to return. The importance of close follow-up was reviewed. The patient was prescribed Tylenol.      Impression & Plan      Medical Decision Making:  Kd Garcia is a 23 year old female who presents for evaluation of a fever, vomiting, and diarrhea.  This is consistent with a viral syndrome, possibly gastroenteritis.  There is no signs at this point of serious bacterial infection such as OM, RPA, epiglottitis, PTA, strep pharyngitis, pneumonia, sinusitis, meningitis, bacteremia, serious bacterial infection. The patient is pregnant. Bedside US reveals normal fetal movement and mildly elevated FHR likely due to maternal fever/tachycardia. She has no clinical symptoms to suggest endometritis, chorioamnionitis, or other pregnancy related complication       Given clear lungs, fever curve, no hypoxia and no respiratory distress I do not feel she needs a CXR at this point as the probability of bacterial pneumonia is very unlikely.   Influenza testing negative. UA neg for UTI. Labs reassuring. Mild leukocytosis consistent with viral syndrome. Patient is tolerating PO intake in the ED. IVF  given. Patient feels significantly improved after above interventions.    I have recommended close follow up with PCP/Ob. Return precautions were discussed with patient. The patient's questions were answered and the patient was agreeable with discharge.      Diagnosis:    ICD-10-CM   1. Viral syndrome B34.9   2. Vomiting and diarrhea R11.10    R19.7       Disposition:  Discharged to home with Tylenol.     Discharge Medications:  acetaminophen 500 MG Caps  2 capsules, Oral, EVERY 8 HOURS PRN, For aches, pain, fever            I, Dany Baires, am serving as a scribe at 7:10 PM on 2/11/2019 to document services personally performed by Dr. Andersen, based on my observations and the provider's statements to me.     EMERGENCY DEPARTMENT       Eligio Andersen MD  02/13/19 3575

## 2019-02-12 NOTE — ED NOTES
"Pt hit call light and is requesting to use the bathroom. Pt also states \"I feel weird after the nurse gave me that nausea medicine\" ED RN was made aware of pt's concerns.  "

## 2019-02-13 ENCOUNTER — PRENATAL OFFICE VISIT (OUTPATIENT)
Dept: MIDWIFE SERVICES | Facility: CLINIC | Age: 24
End: 2019-02-13
Payer: COMMERCIAL

## 2019-02-13 VITALS — WEIGHT: 133.6 LBS | DIASTOLIC BLOOD PRESSURE: 60 MMHG | BODY MASS INDEX: 26.09 KG/M2 | SYSTOLIC BLOOD PRESSURE: 106 MMHG

## 2019-02-13 DIAGNOSIS — Z34.82 SUPERVISION OF NORMAL INTRAUTERINE PREGNANCY IN MULTIGRAVIDA IN SECOND TRIMESTER: Primary | ICD-10-CM

## 2019-02-13 DIAGNOSIS — A08.4 STOMACH FLU: ICD-10-CM

## 2019-02-13 PROCEDURE — 99207 ZZC PRENATAL VISIT: CPT | Performed by: ADVANCED PRACTICE MIDWIFE

## 2019-02-13 NOTE — PROGRESS NOTES
"Feels ok, was seen in the ED on 2/11/19 for fever, headache, vomiting and diarrhea.  Was diagnosed with the stomach flu. Pa states that she has been having pain in her upper abdomen approximately every 30 minutes and that \"it hurts really bad\".  Still having diarrhea, states that the fever and headache have gone away and she is not vomiting anymore.  Is drinking fluids normally, drinking gatorade but is not really eating much.  Has taken Tylenol for the abdominal pain but the pain still occurs. No one else in her family is ill.  Has a note for school from the ED. Denies loss of fluid/vb/contractions/pelvic pain/menstrual-like cramping.  Discussed that the upper abdominal pain is likely related to the GI illness and recommended following up with Primary Care.  Also recommended trying to eat small amounts of a bland diet.  Fetal movement No  Already had GC/Chlamydia testing this pregnancy  Anatomy ultrasound between 18-22 weeks; offer AFP next visit  Return to in clinic 4 weeks    Bev Brady, NABILA, APRN, CNM          "

## 2019-03-14 ENCOUNTER — TELEPHONE (OUTPATIENT)
Dept: MIDWIFE SERVICES | Facility: CLINIC | Age: 24
End: 2019-03-14

## 2019-03-14 NOTE — TELEPHONE ENCOUNTER
@ 18w2d  Pt concerned about a possible TB exposure while at [employer] school clinical today  Roomed a person today that had a mask on and was being seen after being evaluated last evening in the ER for coughing up blood. This person had refused TB testing so unconfirmed TB.   This person was wearing a regular mask, did not cough when pt was around him. This person removed mask for oral temp and was afebrile    Concerned she may have been exposed to TB. Reassured pt that she had limited exposure time, he was afebrile and not coughing, not a confirmed TB case. Encouraged good hand washing which she stated she used. Monitor for sx's and if still concerned at her next prenatal visit she can discuss with midwife and possibly be tested to reassure her.    Pt verbalized understanding, in agreement with plan, and voiced no further questions.

## 2019-03-14 NOTE — TELEPHONE ENCOUNTER
Patient calling because she was exposed to TB at work on two occasions. Is concerned due to being 18 wks and 2 days pg. Please call back to advise.

## 2019-03-24 ENCOUNTER — HOSPITAL ENCOUNTER (EMERGENCY)
Facility: CLINIC | Age: 24
Discharge: HOME | End: 2019-03-24
Attending: EMERGENCY MEDICINE | Admitting: EMERGENCY MEDICINE
Payer: COMMERCIAL

## 2019-03-24 ENCOUNTER — APPOINTMENT (OUTPATIENT)
Dept: GENERAL RADIOLOGY | Facility: CLINIC | Age: 24
End: 2019-03-24
Attending: EMERGENCY MEDICINE
Payer: COMMERCIAL

## 2019-03-24 ENCOUNTER — NURSE TRIAGE (OUTPATIENT)
Dept: NURSING | Facility: CLINIC | Age: 24
End: 2019-03-24

## 2019-03-24 VITALS
DIASTOLIC BLOOD PRESSURE: 58 MMHG | WEIGHT: 138 LBS | RESPIRATION RATE: 18 BRPM | BODY MASS INDEX: 27.09 KG/M2 | OXYGEN SATURATION: 100 % | HEART RATE: 75 BPM | SYSTOLIC BLOOD PRESSURE: 100 MMHG | TEMPERATURE: 99.5 F | HEIGHT: 60 IN

## 2019-03-24 DIAGNOSIS — R10.13 DYSPEPSIA: ICD-10-CM

## 2019-03-24 DIAGNOSIS — R07.89 ATYPICAL CHEST PAIN: ICD-10-CM

## 2019-03-24 LAB
ALBUMIN SERPL-MCNC: 3.2 G/DL (ref 3.4–5)
ALP SERPL-CCNC: 70 U/L (ref 40–150)
ALT SERPL W P-5'-P-CCNC: 19 U/L (ref 0–50)
ANION GAP SERPL CALCULATED.3IONS-SCNC: 9 MMOL/L (ref 3–14)
AST SERPL W P-5'-P-CCNC: 13 U/L (ref 0–45)
BASOPHILS # BLD AUTO: 0 10E9/L (ref 0–0.2)
BASOPHILS NFR BLD AUTO: 0.2 %
BILIRUB SERPL-MCNC: 0.2 MG/DL (ref 0.2–1.3)
BUN SERPL-MCNC: 9 MG/DL (ref 7–30)
CALCIUM SERPL-MCNC: 9.4 MG/DL (ref 8.5–10.1)
CHLORIDE SERPL-SCNC: 107 MMOL/L (ref 94–109)
CO2 SERPL-SCNC: 26 MMOL/L (ref 20–32)
CREAT SERPL-MCNC: 0.48 MG/DL (ref 0.52–1.04)
D DIMER PPP FEU-MCNC: 0.3 UG/ML FEU (ref 0–0.5)
DIFFERENTIAL METHOD BLD: ABNORMAL
EOSINOPHIL # BLD AUTO: 0.2 10E9/L (ref 0–0.7)
EOSINOPHIL NFR BLD AUTO: 1.5 %
ERYTHROCYTE [DISTWIDTH] IN BLOOD BY AUTOMATED COUNT: 13 % (ref 10–15)
GFR SERPL CREATININE-BSD FRML MDRD: >90 ML/MIN/{1.73_M2}
GLUCOSE SERPL-MCNC: 82 MG/DL (ref 70–99)
HCT VFR BLD AUTO: 34.3 % (ref 35–47)
HGB BLD-MCNC: 11.6 G/DL (ref 11.7–15.7)
IMM GRANULOCYTES # BLD: 0.3 10E9/L (ref 0–0.4)
IMM GRANULOCYTES NFR BLD: 2 %
LIPASE SERPL-CCNC: 117 U/L (ref 73–393)
LYMPHOCYTES # BLD AUTO: 2.4 10E9/L (ref 0.8–5.3)
LYMPHOCYTES NFR BLD AUTO: 19.3 %
MCH RBC QN AUTO: 30.5 PG (ref 26.5–33)
MCHC RBC AUTO-ENTMCNC: 33.8 G/DL (ref 31.5–36.5)
MCV RBC AUTO: 90 FL (ref 78–100)
MONOCYTES # BLD AUTO: 1.1 10E9/L (ref 0–1.3)
MONOCYTES NFR BLD AUTO: 8.3 %
NEUTROPHILS # BLD AUTO: 8.7 10E9/L (ref 1.6–8.3)
NEUTROPHILS NFR BLD AUTO: 68.7 %
NRBC # BLD AUTO: 0 10*3/UL
NRBC BLD AUTO-RTO: 0 /100
PLATELET # BLD AUTO: 269 10E9/L (ref 150–450)
POTASSIUM SERPL-SCNC: 3.9 MMOL/L (ref 3.4–5.3)
PROT SERPL-MCNC: 6.9 G/DL (ref 6.8–8.8)
RBC # BLD AUTO: 3.8 10E12/L (ref 3.8–5.2)
SODIUM SERPL-SCNC: 142 MMOL/L (ref 133–144)
TROPONIN I SERPL-MCNC: <0.015 UG/L (ref 0–0.04)
WBC # BLD AUTO: 12.7 10E9/L (ref 4–11)

## 2019-03-24 PROCEDURE — 83690 ASSAY OF LIPASE: CPT | Performed by: EMERGENCY MEDICINE

## 2019-03-24 PROCEDURE — 25000132 ZZH RX MED GY IP 250 OP 250 PS 637: Performed by: EMERGENCY MEDICINE

## 2019-03-24 PROCEDURE — 93005 ELECTROCARDIOGRAM TRACING: CPT

## 2019-03-24 PROCEDURE — 80053 COMPREHEN METABOLIC PANEL: CPT | Performed by: EMERGENCY MEDICINE

## 2019-03-24 PROCEDURE — 99285 EMERGENCY DEPT VISIT HI MDM: CPT | Mod: 25

## 2019-03-24 PROCEDURE — 85025 COMPLETE CBC W/AUTO DIFF WBC: CPT | Performed by: EMERGENCY MEDICINE

## 2019-03-24 PROCEDURE — 96374 THER/PROPH/DIAG INJ IV PUSH: CPT

## 2019-03-24 PROCEDURE — 25000125 ZZHC RX 250: Performed by: EMERGENCY MEDICINE

## 2019-03-24 PROCEDURE — 71046 X-RAY EXAM CHEST 2 VIEWS: CPT

## 2019-03-24 PROCEDURE — 84484 ASSAY OF TROPONIN QUANT: CPT | Performed by: EMERGENCY MEDICINE

## 2019-03-24 PROCEDURE — 85379 FIBRIN DEGRADATION QUANT: CPT | Performed by: EMERGENCY MEDICINE

## 2019-03-24 RX ORDER — SUCRALFATE 1 G/1
1 TABLET ORAL 4 TIMES DAILY PRN
Qty: 30 TABLET | Refills: 0 | Status: SHIPPED | OUTPATIENT
Start: 2019-03-24 | End: 2019-04-24

## 2019-03-24 RX ORDER — SUCRALFATE ORAL 1 G/10ML
1 SUSPENSION ORAL ONCE
Status: DISCONTINUED | OUTPATIENT
Start: 2019-03-24 | End: 2019-03-24 | Stop reason: HOSPADM

## 2019-03-24 RX ORDER — ALUMINA, MAGNESIA, AND SIMETHICONE 2400; 2400; 240 MG/30ML; MG/30ML; MG/30ML
15 SUSPENSION ORAL ONCE
Status: COMPLETED | OUTPATIENT
Start: 2019-03-24 | End: 2019-03-24

## 2019-03-24 RX ADMIN — ALUMINUM HYDROXIDE, MAGNESIUM HYDROXIDE, AND DIMETHICONE 15 ML: 400; 400; 40 SUSPENSION ORAL at 15:56

## 2019-03-24 RX ADMIN — FAMOTIDINE 20 MG: 10 INJECTION, SOLUTION INTRAVENOUS at 15:55

## 2019-03-24 RX ADMIN — LIDOCAINE HYDROCHLORIDE 15 ML: 20 SOLUTION ORAL; TOPICAL at 15:56

## 2019-03-24 ASSESSMENT — ENCOUNTER SYMPTOMS: NAUSEA: 0

## 2019-03-24 ASSESSMENT — MIFFLIN-ST. JEOR: SCORE: 1297.46

## 2019-03-24 NOTE — TELEPHONE ENCOUNTER
Per patient, for the past 2 hours, she is having left sided/middle chest pain.  Pain is in the middle, between her breasts.     She has had episodes like this with her past pregnancy.    She had been lying on her right side. Typically, it would resolve wiith sitting up, but today it persists.    She cannot lay down/back - has pain radiating to her back.  It hurts if she leans to either side.   Cannot take a deep breath in.  Hurts to breathe    No fever  Fetus is active.    Advised to go to the ED now.  She states she will go to the [facility] ED now.    Protocol and care advice reviewed  Caller states understanding of the recommended disposition  Advised to call back if further questions or concerns      Reason for Disposition    Difficulty breathing    Additional Information    Negative: Severe difficulty breathing (e.g., struggling for each breath, speaks in single words)    Negative: Difficult to awaken or acting confused (e.g., disoriented, slurred speech)    Negative: Shock suspected (e.g., cold/pale/clammy skin, too weak to stand, low BP, rapid pulse)    Negative: [1] Chest pain lasts > 5 minutes AND [2] history of heart disease  (i.e., heart attack, bypass surgery, angina, angioplasty, CHF; not just a heart murmur)    Negative: [1] Chest pain lasts > 5 minutes AND [2] described as crushing, pressure-like, or heavy    Negative: [1] Chest pain lasts > 5 minutes AND [2] age > 50    Negative: [1] Chest pain lasts > 5 minutes AND [2] age > 30 AND [3] at least one cardiac risk factor (i.e., hypertension, diabetes, obesity, smoker or strong family history of heart disease)    Negative: [1] Chest pain lasts > 5 minutes AND [2] not relieved with nitroglycerin    Negative: Passed out (i.e., lost consciousness, collapsed and was not responding)    Negative: Heart beating < 50 beats per minute OR > 140 beats per minute    Negative: Visible sweat on face or sweat dripping down face    Negative: Sounds like a  "life-threatening emergency to the triager    Negative: SEVERE chest pain    Negative: [1] Intermittent  chest pain or \"angina\" AND [2] increasing in severity or frequency  (Exception: pains lasting a few seconds)    Negative: Pain also present in shoulder(s) or arm(s) or jaw  (Exception: pain is clearly made worse by movement)    Protocols used: CHEST PAIN-ADULT-AH      "

## 2019-03-24 NOTE — PROGRESS NOTES
"Pt refuses ordered carafate  > when asked why - she states > uhmm leroy had 2 medications all ready - I thnk that is enough - I dont like to take a lot of meds \"  "

## 2019-03-24 NOTE — DISCHARGE INSTRUCTIONS
Discharge Instructions  Chest Pain    You have been seen today for chest pain or discomfort.  At this time, your provider has found no signs that your chest pain is due to a serious or life-threatening condition, (or you have declined more testing and/or admission to the hospital). However, sometimes there is a serious problem that does not show up right away. Your evaluation today may not be complete and you may need further testing and evaluation.     Generally, every Emergency Department visit should have a follow-up clinic visit with either a primary or a specialty clinic/provider. Please follow-up as instructed by your emergency provider today.  Return to the Emergency Department if:  Your chest pain changes, gets worse, starts to happen more often, or comes with less activity.  You are newly short of breath.  You get very weak or tired.  You pass out or faint.  You have any new symptoms, like fever, cough, numb legs, or you cough up blood.  You have anything else that worries you.    Until you follow-up with your regular provider, please do the following:  Take one aspirin daily unless you have an allergy or are told not to by your provider.  If a stress test appointment has been made, go to the appointment.  If you have questions, contact your regular provider.  Follow-up with your regular provider/clinic as directed; this is very important.    If you were given a prescription for medicine here today, be sure to read all of the information (including the package insert) that comes with your prescription.  This will include important information about the medicine, its side effects, and any warnings that you need to know about.  The pharmacist who fills the prescription can provide more information and answer questions you may have about the medicine.  If you have questions or concerns that the pharmacist cannot address, please call or return to the Emergency Department.       Remember that you can always come  back to the Emergency Department if you are not able to see your regular provider in the amount of time listed above, if you get any new symptoms, or if there is anything that worries you.

## 2019-03-24 NOTE — ED PROVIDER NOTES
History     Chief Complaint:  Chest Pain    HPI   Pa Kev Garcia is a 19-weeks pregnant 23 year old female ( A0) who presents to the emergency department for evaluation of chest pain. The patient reports she has experienced sternal chest pain radiating slightly left since this morning, worse when she lies down or takes a deep breath, onset was when she was lying on her side. She notes she did eat spicy food just prior to onset of the pain, She indicates she has experienced similar pain in her previous pregnancy, and her previous episodes improved with sitting up straight, but this episode has not improved despite waiting 2-3 hours. She remarks she does have history of heartburn, and this does not feel like heartburn. The patient denies any nausea.    Allergies:  NKDA     Medications:    The patient is currently on no regular medications.     Past Medical History:    Heartburn    Past Surgical History:    Left arm fixation    Family History:    CAD  MI  HTN  Cerebrovascular disease    Social History:  Presents alone.  Former smoker.  Negative for alcohol use.  Marital Status:  Single [1]     Review of Systems   Cardiovascular: Positive for chest pain.   Gastrointestinal: Negative for nausea.   All other systems reviewed and are negative.        Physical Exam     Patient Vitals for the past 24 hrs:   BP Temp Temp src Pulse Resp SpO2 Height Weight   19 1630 100/58 -- -- 75 -- 100 % -- --   19 1615 -- -- -- -- -- 99 % -- --   19 1600 111/66 -- -- 91 -- 97 % -- --   19 1519 113/68 99.5  F (37.5  C) Oral 81 18 99 % 1.524 m (5') 62.6 kg (138 lb)     Physical Exam  General: Alert, interactive in mild distress  Head:  Scalp is atraumatic  Eyes:  The pupils are equal, round, and reactive to light    EOM's intact    No scleral icterus  ENT:      Nose:  The external nose is normal  Ears:  External ears are normal  Mouth/Throat: The oropharynx is normal    Mucus membranes are moist        Neck:  Normal range of motion.      There is no rigidity.    Trachea is in the midline         CV:  Regular rate and rhythm    No murmur   Resp:  Breath sounds are clear bilaterally    Non-labored, no retractions or accessory muscle use      GI:  Abdomen is soft, no distension, no tenderness. Gravid abdomen.      MS:  Normal strength in all 4 extremities  Skin:  Warm and dry, No rash or lesions noted.  Neuro: Strength 5/5 x4.    Psych:  Awake. Alert.  Normal affect.      Appropriate interactions.      Emergency Department Course   ECG:  Time: 1517  Vent. Rate 91 bpm. MN interval 134. QRS duration 90. QT/QTc 372/457. P-R-T axis 67 40 47.  Normal sinus rhythm.  Normal ECG.  Read time: 1535    Imaging:  Radiographic findings were communicated with the patient who voiced understanding of the findings.    XR Chest 2 views:   No acute abnormality. Lungs are well-inflated and clear.  Heart size is normal. As per radiology.     Laboratory:  CBC: WBC: 12.7 (H), HGB: 11.6 (L), PLT: 269  CMP: Creatinine 0.48 (L), Albumin 3.2 (L), o/w WNL     D dimer: 0.3    Lipase: 117    1550 Troponin I: <0.015    Interventions:  1555 Pepcid 20 mg IV  1556 GI Cocktail 30 mL PO    Emergency Department Course:  Nursing notes and vitals reviewed. 1537 I performed an exam of the patient as documented above.     EKG obtained in the ED, see results above.     IV inserted. Medicine administered as documented above. Blood drawn. This was sent to the lab for further testing, results above.    The patient was sent for a XR Chest while in the emergency department, findings above.     1710 I rechecked the patient and discussed the results of her workup thus far.     Findings and plan explained to the Patient. Patient discharged home with instructions regarding supportive care, medications, and reasons to return. The importance of close follow-up was reviewed. The patient was prescribed Zantac, Carafate.    I personally reviewed the laboratory  results with the Patient and answered all related questions prior to discharge.    Impression & Plan      Medical Decision Making:  Kd Garcia is a 23 year old female who was seen and evaluated. A broad differential including acute coronary syndrome, pulmonary embolism, dyspepsia, pancreatitis, and cholecystitis were all considered. EKG is nonischemic. No cardiac risk factors. Negative troponin. Several hours of symptoms at this time, so I doubt ACS. With a negative D-dimer, I think PE is unlikely: no hypoxia, no tachypnea or tachycardia to suggest this. The remainder of her laboratory workup is unremarkable. She had minimal improvement with medications as above, but I do believe this likely represents dyspepsia. I have prescribed Carafate and Zantac for outpatient use. I have recommended a bland diet and return if new symptoms develop. She agrees to this plan. Chest radiograph demonstrates no pneumonia, pneumothorax, or more concerning illness.     Diagnosis:    ICD-10-CM   1. Dyspepsia R10.13   2. Atypical chest pain R07.89       Disposition:  discharged to home    Discharge Medications:     Medication List      Started    ranitidine 150 MG tablet  Commonly known as:  ZANTAC  150 mg, Oral, 2 TIMES DAILY     sucralfate 1 GM tablet  Commonly known as:  CARAFATE  1 g, Oral, 4 TIMES DAILY PRN       IRichard, am serving as a scribe on 3/24/2019 at 3:39 PM to personally document services performed by Kristian Ying,* based on my observations and the provider's statements to me.     Richard Jacob  3/24/2019    EMERGENCY DEPARTMENT       Kristian Ying MD  03/24/19 7120

## 2019-03-24 NOTE — ED AVS SNAPSHOT
Emergency Department  [address] [place] [address]  Phone:  [phone]  Fax:  [phone]                                    Kd Garcia   MRN: [id number]    Department:   Emergency Department   Date of Visit:  3/24/2019           After Visit Summary Signature Page    I have received my discharge instructions, and my questions have been answered. I have discussed any challenges I see with this plan with the nurse or doctor.    ..........................................................................................................................................  Patient/Patient Representative Signature      ..........................................................................................................................................  Patient Representative Print Name and Relationship to Patient    ..................................................               ................................................  Date                                   Time    ..........................................................................................................................................  Reviewed by Signature/Title    ...................................................              ..............................................  Date                                               Time          22EPIC Rev 08/18

## 2019-03-25 ENCOUNTER — PRENATAL OFFICE VISIT (OUTPATIENT)
Dept: MIDWIFE SERVICES | Facility: CLINIC | Age: 24
End: 2019-03-25
Payer: COMMERCIAL

## 2019-03-25 ENCOUNTER — ANCILLARY PROCEDURE (OUTPATIENT)
Dept: ULTRASOUND IMAGING | Facility: CLINIC | Age: 24
End: 2019-03-25
Payer: COMMERCIAL

## 2019-03-25 VITALS — WEIGHT: 139 LBS | DIASTOLIC BLOOD PRESSURE: 58 MMHG | BODY MASS INDEX: 27.15 KG/M2 | SYSTOLIC BLOOD PRESSURE: 104 MMHG

## 2019-03-25 DIAGNOSIS — Z36.89 ENCOUNTER FOR FETAL ANATOMIC SURVEY: ICD-10-CM

## 2019-03-25 DIAGNOSIS — Z34.82 SUPERVISION OF NORMAL INTRAUTERINE PREGNANCY IN MULTIGRAVIDA IN SECOND TRIMESTER: Primary | ICD-10-CM

## 2019-03-25 DIAGNOSIS — Z3A.19 19 WEEKS GESTATION OF PREGNANCY: ICD-10-CM

## 2019-03-25 DIAGNOSIS — Z20.1 RECENT EXPOSURE TO TUBERCULOSIS: ICD-10-CM

## 2019-03-25 PROCEDURE — 99207 ZZC PRENATAL VISIT: CPT | Performed by: ADVANCED PRACTICE MIDWIFE

## 2019-03-25 PROCEDURE — 76805 OB US >/= 14 WKS SNGL FETUS: CPT | Performed by: OBSTETRICS & GYNECOLOGY

## 2019-03-25 NOTE — PATIENT INSTRUCTIONS
GESTATIONAL DIABETES SCREENING    All pregnant women are screened at least once for diabetes as part of their prenatal care. A woman has gestational diabetes if she has high blood sugars for the first time during pregnancy.      Diabetes can harm your health and the health of your baby.  But if we find the diabetes early in pregnancy we will watch your health closely and prevent further problems.       We will check for gestational diabetes during your visit between 24-28 weeks visit. Please note you can not do this prior to 24 weeks of gestation.      Plan to spend about an hour at the clinic.  When you check in let us know that you will be having your diabetes screening that day.       We will give you a 50 gram glucose drink that you have 5 minutes to consume.  Exactly one hour later you will have draw blood from your arm to check your blood sugar level.      We will call you to let you know if your results are normal.  If the results are normal no more testing will be needed.  If your results are not normal we will discuss follow up testing with you.        You may eat prior to the testing but it is not recommended to eat or drink very sweet things such as pop, juice, candy or dessert type foods.  Eat a high protein, low carb meals prior to testing.    If you have any questions please call:    [facility]    [phone]      Round Ligament Pain    Pregnancy can entail many normal discomforts. One of those discomforts may be round ligament pain. Round ligament pain occurs as your uterus and your baby grow and the muscles begin to stretch more in your abdomen. Round ligament pain is normal and not dangerous but can be very uncomfortable      Round ligament pain is typically described as an unpleasant sensation that ranges from a sharp knifelike pain to dull intermittent pain in the lower abdominal/suprapubic area of a pregnant woman      Virtually all pregnant women will experience this pain at some  point during their pregnancies      It typically manifests between 16 and 20 weeks of pregnancy and can be incredibly bothersome especially for women who remain very active during their pregnancies       Please discuss with your midwife if you are having this type of pain; sometimes the pain can be associated with other medical conditions so it is important for us to assess you just to make sure    Comfort measures    There are certain things you can do to cope with round ligament pain and ease the discomfort you are having      Pregnancy support belt      Tylenol      Hot/ice packs      Baths       Exercise such as yoga and swimming that help stretch muscles      Prenatal massage      Reflexology to waist and pelvic joints       Positioning such as side-lying and hands and knees, make sure your abdomen is  well supported with pillows while doing different positions

## 2019-03-25 NOTE — PROGRESS NOTES
"Feels well.  Was seen in the ED yesterday for chest pain; all workup was unremarkable, diagnosed with heartburn. Pa states that she had the same chest discomfort with her other pregnancies.  Discussed calling or being seen in the ED again with worsening symptoms.  Possibly exposed to TB on March 14th at clinicals at [facility].  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 The patient refused to be tested for TB so no diagnosis was made. Pa was in the room with the patient for approximately 5 minutes; the patient was wearing a mask except for when temperature was being taken. Pa denies any respiratory symptoms except for a cough that she had prior to incident; she denies any other TB symptoms.  Pa states that she discussed the incident with her clinical instructor, who \"wasn't concerned\".  Recommended that Pa contact employee health at [facility] and ask for their recommendations.  Discussed that it may be best to wait to test here if Pa desires because exposure was so recent; instructed Pa to call with any TB symptoms, may consider drawing TB Gold next visit (5+ weeks after exposure) or sooner with symptoms.   Fetal movement: positive   Denies loss of fluid/vb/contractions  Anatomy ultrasound results discussed; to be reviewed by Dr. Mariah BARKER, having a Girl, Placenta:  Posterior  GCT visit between 24-28 weeks, handout provided, reminded of longer appointment  Round ligament pain and comfort measures reviewed  Return to clinic in 4 weeks. GCT next visit.    Bev Brown, DNP, APRN, CNM  "

## 2019-03-27 LAB — INTERPRETATION ECG - MUSE: NORMAL

## 2019-04-15 DIAGNOSIS — Z34.82 SUPERVISION OF NORMAL INTRAUTERINE PREGNANCY IN MULTIGRAVIDA IN SECOND TRIMESTER: Primary | ICD-10-CM

## 2019-04-19 ENCOUNTER — HEALTH MAINTENANCE LETTER (OUTPATIENT)
Age: 24
End: 2019-04-19

## 2019-04-23 ENCOUNTER — TELEPHONE (OUTPATIENT)
Dept: MIDWIFE SERVICES | Facility: CLINIC | Age: 24
End: 2019-04-23

## 2019-04-23 NOTE — TELEPHONE ENCOUNTER
Pt non-immune to Hep B. Surface antigen non reactive, surface antibody 0.43 (nl is less than 8) and needs a Hep B vaccine for clinicals  Hep B not contraindicated in pregnancy if pt is at a higher risk for infection-will route to midwives to advise if ok to have vaccination while pregnant or to defer until post partum.   Pt will need a letter to get the vaccine if ok'd by midwives.    Raiza Ruff RN on 4/23/2019 at 12:46 PM

## 2019-04-23 NOTE — TELEPHONE ENCOUNTER
Note printed for hep b vaccine. Pt will  tomorrow at her appointment  Raiza Ruff RN on 4/23/2019 at 1:19 PM

## 2019-04-23 NOTE — TELEPHONE ENCOUNTER
Pregnancy is not a contraindication for the Hep B vaccine; available data suggests that it does not cause major birth defects.  We typically wait until postpartum to give vaccines if possible, but it is acceptable to give the Hep B vaccine during pregnancy if needed.    Bev Brown, DNP, APRN, CNM

## 2019-04-23 NOTE — PROGRESS NOTES
Feels well overall. Here today with Foua and daughter.  Fetal movement: positive   Denies loss of fluid/vb/contractions  GCT done today  Tdap next visit; reviewed CDC recommendations and partner/family vaccination recommended as well  Water birth discussed, patient is somewhat interested  Need for Rhogam? No; O+   TB Gold drawn at work.    Return to clinic 4 weeks    Elisabeth Lozano, NABILA, APRN, CNM

## 2019-04-23 NOTE — LETTER
[facility]  [address] [address]  Phone: [phone]  Fax: [phone]    April 23, 2019        Kd Garcia  [address]  [place] [address]          To whom it may concern:    RE: Kd Yi may get the Hepatitis B vaccination.    Please contact me for questions or concerns.      Sincerely,        PETER Bronson CNM

## 2019-04-24 ENCOUNTER — PRENATAL OFFICE VISIT (OUTPATIENT)
Dept: MIDWIFE SERVICES | Facility: CLINIC | Age: 24
End: 2019-04-24

## 2019-04-24 VITALS — DIASTOLIC BLOOD PRESSURE: 50 MMHG | BODY MASS INDEX: 28.71 KG/M2 | SYSTOLIC BLOOD PRESSURE: 104 MMHG | WEIGHT: 147 LBS

## 2019-04-24 DIAGNOSIS — Z34.82 SUPERVISION OF NORMAL INTRAUTERINE PREGNANCY IN MULTIGRAVIDA IN SECOND TRIMESTER: Primary | ICD-10-CM

## 2019-04-24 DIAGNOSIS — Z34.82 SUPERVISION OF NORMAL INTRAUTERINE PREGNANCY IN MULTIGRAVIDA IN SECOND TRIMESTER: ICD-10-CM

## 2019-04-24 LAB
ERYTHROCYTE [DISTWIDTH] IN BLOOD BY AUTOMATED COUNT: 12.6 % (ref 10–15)
GLUCOSE 1H P 50 G GLC PO SERPL-MCNC: 107 MG/DL (ref 60–129)
HCT VFR BLD AUTO: 34.7 % (ref 35–47)
HGB BLD-MCNC: 11.3 G/DL (ref 11.7–15.7)
MCH RBC QN AUTO: 30.4 PG (ref 26.5–33)
MCHC RBC AUTO-ENTMCNC: 32.6 G/DL (ref 31.5–36.5)
MCV RBC AUTO: 93 FL (ref 78–100)
PLATELET # BLD AUTO: 232 10E9/L (ref 150–450)
RBC # BLD AUTO: 3.72 10E12/L (ref 3.8–5.2)
WBC # BLD AUTO: 12 10E9/L (ref 4–11)

## 2019-04-24 PROCEDURE — 82950 GLUCOSE TEST: CPT | Performed by: ADVANCED PRACTICE MIDWIFE

## 2019-04-24 PROCEDURE — 86803 HEPATITIS C AB TEST: CPT | Performed by: ADVANCED PRACTICE MIDWIFE

## 2019-04-24 PROCEDURE — 36415 COLL VENOUS BLD VENIPUNCTURE: CPT | Performed by: ADVANCED PRACTICE MIDWIFE

## 2019-04-24 PROCEDURE — 85027 COMPLETE CBC AUTOMATED: CPT | Performed by: ADVANCED PRACTICE MIDWIFE

## 2019-04-24 PROCEDURE — 99207 ZZC PRENATAL VISIT: CPT | Performed by: ADVANCED PRACTICE MIDWIFE

## 2019-04-24 NOTE — PATIENT INSTRUCTIONS
Round Ligament Pain    Pregnancy can entail many normal discomforts. One of those discomforts may be round ligament pain. Round ligament pain occurs as your uterus and your baby grow and the muscles begin to stretch more in your abdomen. Round ligament pain is normal and not dangerous but can be very uncomfortable      Round ligament pain is typically described as an unpleasant sensation that ranges from a sharp knifelike pain to dull intermittent pain in the lower abdominal/suprapubic area of a pregnant woman      Virtually all pregnant women will experience this pain at some point during their pregnancies      It typically manifests between 16 and 20 weeks of pregnancy and can be incredibly bothersome especially for women who remain very active during their pregnancies       Please discuss with your midwife if you are having this type of pain; sometimes the pain can be associated with other medical conditions so it is important for us to assess you just to make sure    Comfort measures    There are certain things you can do to cope with round ligament pain and ease the discomfort you are having      Pregnancy support belt      Tylenol      Hot/ice packs      Baths       Exercise such as yoga and swimming that help stretch muscles      Prenatal massage      Reflexology to waist and pelvic joints       Positioning such as side-lying and hands and knees, make sure your abdomen is  well supported with pillows while doing different positions

## 2019-04-25 LAB — HCV AB SERPL QL IA: NONREACTIVE

## 2019-06-17 ENCOUNTER — PRENATAL OFFICE VISIT (OUTPATIENT)
Dept: MIDWIFE SERVICES | Facility: CLINIC | Age: 24
End: 2019-06-17

## 2019-06-17 VITALS — WEIGHT: 155.6 LBS | BODY MASS INDEX: 30.39 KG/M2 | SYSTOLIC BLOOD PRESSURE: 102 MMHG | DIASTOLIC BLOOD PRESSURE: 48 MMHG

## 2019-06-17 DIAGNOSIS — Z34.83 SUPERVISION OF NORMAL INTRAUTERINE PREGNANCY IN MULTIGRAVIDA IN THIRD TRIMESTER: Primary | ICD-10-CM

## 2019-06-17 PROCEDURE — 99207 ZZC PRENATAL VISIT: CPT | Performed by: ADVANCED PRACTICE MIDWIFE

## 2019-06-17 NOTE — PROGRESS NOTES
Feels well overall. Here alone today. No concerns.  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 I offered an rx for a support belt. She declined and stated she was fine.   Fetal Movement: positive, denies loss of fluid/vb, no contractions  Fetal kick counts/movement reviewed  Reviewed PTL precautions and s/sx of preeclampsia; denies any S&S and aware of what to report     Peds chosen: Yes    Plans to breastfeed Undecided. She breast fed her first 2, but thinks she may use formula for this baby.    Return to clinic 2 weeks    Elisabeth Lozano, DNP, APRN, CNM

## 2019-06-17 NOTE — PATIENT INSTRUCTIONS

## 2019-06-19 ENCOUNTER — MYC MEDICAL ADVICE (OUTPATIENT)
Dept: MIDWIFE SERVICES | Facility: CLINIC | Age: 24
End: 2019-06-19

## 2019-07-10 ENCOUNTER — PRENATAL OFFICE VISIT (OUTPATIENT)
Dept: MIDWIFE SERVICES | Facility: CLINIC | Age: 24
End: 2019-07-10
Payer: COMMERCIAL

## 2019-07-10 VITALS — WEIGHT: 159.2 LBS | DIASTOLIC BLOOD PRESSURE: 50 MMHG | SYSTOLIC BLOOD PRESSURE: 92 MMHG | BODY MASS INDEX: 31.09 KG/M2

## 2019-07-10 DIAGNOSIS — Z34.83 SUPERVISION OF NORMAL INTRAUTERINE PREGNANCY IN MULTIGRAVIDA IN THIRD TRIMESTER: ICD-10-CM

## 2019-07-10 DIAGNOSIS — Z23 NEED FOR TDAP VACCINATION: Primary | ICD-10-CM

## 2019-07-10 PROCEDURE — 99207 ZZC PRENATAL VISIT: CPT | Performed by: ADVANCED PRACTICE MIDWIFE

## 2019-07-10 PROCEDURE — 90715 TDAP VACCINE 7 YRS/> IM: CPT | Performed by: ADVANCED PRACTICE MIDWIFE

## 2019-07-10 PROCEDURE — 90471 IMMUNIZATION ADMIN: CPT | Performed by: ADVANCED PRACTICE MIDWIFE

## 2019-07-10 NOTE — PROGRESS NOTES
Feels well overall  Fetal Movement: positive, denies loss of fluid/vb, no  contractions  Fetal kick counts/movement reviewed    Reviewed PTL precautions and s/sx of preeclampsia; denies any S&S and aware of what to report  Tdap done today  Taking hospital tour?  No  Have car seat Yes  Discussed GBS/cx check at next visit    Return to clinic 1 week    Mabel GREEN CNM

## 2019-07-10 NOTE — PATIENT INSTRUCTIONS
"GROUP B STREP    Group B Strep (GBS) is a common bacteria that is sometimes found in the vagina, urinary tract or rectum.  It is not harmful typically to adults but can cause serious illness in newborns.  It occasionally is passed from mother to baby during birth.   It is important to test in pregnancy.  When a woman is found to be positive for GBS, either at the first prenatal visit or by taking a culture at 36 weeks, treatment will be offered to reduce the chance of spreading the bacteria to the baby.       Treatment consists of either oral antibiotics early in pregnancy or antibiotics given by IV during labor if testing is positive at 36 weeks.      Even without treatment the baby rarely (1-2% of the time) gets infected.  With treatment the baby almost never gets infected.       There really isn't anything you can do to keep from getting or being positive for GBS.  It isn't sexually transmitted and there are no symptoms if you are positive.       Your midwife will discuss your results with you and make recommendations for treatment.    PREECLAMPSIA SIGNS AND SYMPTOMS    Preeclampsia is a dangerous condition that some women develop in the second half of pregnancy. It can also begin after the baby is born.  Preeclampsia causes high blood pressure and can cause problems with many organ systems in your body.  It can also affect the growth of your baby. The exact cause of preeclampsia is unknown, however, there are signs and symptoms to watch for:    -A bad headache that doesn't improve with Tylenol  -Visual changes such as spots, flashes of light, blurry vision  -Pain in the upper right part of your abdomen, especially under the ribs that doesn't go away  -Nausea and/or vomiting  -Feeling extremely tired  -Yellowing of the skin and/or eyes  -Feeling \"not quite right\" or that something is wrong  -An extreme amount of swelling (some swelling in pregnancy is very normal)    If your midwife feels that you are developing " preeclampsia, you will have lab tests drawn and will be monitored very closely.     If you are experiencing anyof these symptoms, call the [facility] immediately at [phone].    Fetal Kick Counts    It is important to know when your baby's movements occur. We often get busy with work and life and do not pay close attention to their movements.        Women typically begin feeling movement between 18-22 weeks of gestation, sometimes it can be earlier or later depending on where your placenta is       Movements usually begin feeling like popping or fluttering and as the baby grows they become more pronounced    Toward the end of pregnancy as the baby gets larger they may not move as much or make as big of movements. Babies have maturing sleep cycles as well as not as much room to move and flip. If you are ever concerned about your baby's movements or have not felt the baby move for a while, we recommend you do a fetal kick count. Prior to starting your count drink a glass of water or juice and eat a snack. Then lay down on your side and begin to count movements.     How to do a Fetal Kick Counts    There are many different ways to monitor your baby's movements. Movements can range from large jabs to small kicks, or wiggles.  Hiccups count!      Count 10 movements in 2 hours when resting and focusing    Count 10 movements in 12 hours when doing normal activity    We recommend that if movements occur but seem decreased that you should be seen in the clinic or hospital for evaluation within 12 hours. If fetal movement is absent or fetal kick counts are low please contact us right away.    If you ever have any concerns about your baby's movements DO NOT HESITATE to call us, we are here for you!    [facility]  [phone]    SIGNS OF  LABOR    Labor is  if it happens more than three weeks before your due date.    It can be hard to know if you are in labor, since the symptoms  can be like the normal feelings of pregnancy.  Often, the only difference is the symptoms increase or they don't go away.     Signs of  labor can include:      Contractions which can feel like period cramps or gas pain.  You may feel it in the lower part of your abdomen, in your back, or as a pressure feeling in your bottom.  It is often regular, coming every 5 or 10 minutes, and  lasting about 30-60 seconds. Some contractions are normal during pregnancy (Dewart rebolledo contractions) but if you are feeling more than 5-6 in one hour that is NOT normal    If this occurs empty your bladder, then drink 2-3 glasses of water, eat a snack, and lay down on your left side. Put your hand on your abdomen to count the contractions.  If after one hour of resting you have still had 5-6 contractions call your clinic right away.      If you feel a pop, gush, or trickle of fluid it may mean that your bag of water has broken and you should contact the clinic       You may also experience loose stools and/or rectal pressure       Listen to your body, if something doesn't seem right please call us at the clinic    Risk Factors      Previous  delivery    Bacterial Vaginosis- if you notice a fishy smell to your discharge or experience vaginal itching/discomfort you should be evaluated for infection    Smoking    Drug abuse    Adolescent (teen) pregnancy or advanced maternal age (AMA) age 35 and over    Dehydration (this may not cause  labor but it can cause contractions)    If you think you are in  labor we may do some lab testing in the clinic or send you to the hospital for evaluation    Please call us if you are concerned you are in  labor.    [facility]  [phone]       (0) independent

## 2019-07-18 ENCOUNTER — PRENATAL OFFICE VISIT (OUTPATIENT)
Dept: MIDWIFE SERVICES | Facility: CLINIC | Age: 24
End: 2019-07-18
Payer: COMMERCIAL

## 2019-07-18 VITALS — DIASTOLIC BLOOD PRESSURE: 62 MMHG | WEIGHT: 161.6 LBS | BODY MASS INDEX: 31.56 KG/M2 | SYSTOLIC BLOOD PRESSURE: 100 MMHG

## 2019-07-18 DIAGNOSIS — Z34.83 SUPERVISION OF NORMAL INTRAUTERINE PREGNANCY IN MULTIGRAVIDA IN THIRD TRIMESTER: Primary | ICD-10-CM

## 2019-07-18 DIAGNOSIS — Z23 NEED FOR MMR VACCINE: ICD-10-CM

## 2019-07-18 DIAGNOSIS — Z36.85 ANTENATAL SCREENING FOR STREPTOCOCCUS B: ICD-10-CM

## 2019-07-18 PROCEDURE — 87653 STREP B DNA AMP PROBE: CPT | Performed by: NURSE PRACTITIONER

## 2019-07-18 PROCEDURE — 99207 ZZC PRENATAL VISIT: CPT | Performed by: NURSE PRACTITIONER

## 2019-07-18 NOTE — PATIENT INSTRUCTIONS
"PREECLAMPSIA SIGNS AND SYMPTOMS    Preeclampsia is a dangerous condition that some women develop in the second half of pregnancy. It can also begin after the baby is born.  Preeclampsia causes high blood pressure and can cause problems with many organ systems in your body.  It can also affect the growth of your baby. The exact cause of preeclampsia is unknown, however, there are signs and symptoms to watch for:    -A bad headache that doesn't improve with Tylenol  -Visual changes such as spots, flashes of light, blurry vision  -Pain in the upper right part of your abdomen, especially under the ribs that doesn't go away  -Nausea and/or vomiting  -Feeling extremely tired  -Yellowing of the skin and/or eyes  -Feeling \"not quite right\" or that something is wrong  -An extreme amount of swelling (some swelling in pregnancy is very normal)    If your midwife feels that you are developing preeclampsia, you will have lab tests drawn and will be monitored very closely.     If you are experiencing anyof these symptoms, call the [facility] immediately at [phone].  Labor Instructions for Midwife Patients    When to call:  Both during and after office hours call  [phone]. There is a triage RN to take your calls and answer your questions 24 hours a day.  If she cannot answer your question she will page the midwife on call for you.    When to call:  Call anytime you have important concerns about you or your baby.     Call if:    You are having contractions at regular intervals about 5-6 minutes apart lasting 30-60 seconds and becoming increasingly more intense     You have an uncontrollable gush of fluid from your vagina or feel a pop and gush like your water has broken    You have HEAVY bleeding, like heavy period, blood running down your legs, or  soaking a pad.     Some bleeding after a pelvic exam, after intercourse, or in labor when your cervix is dilating is normal and is referred to as \"bloody " "show\"    You have severe, continuous back or abdominal pain    You feel it is time to go to the hospital    If this is your first labor, call when contractions are very intense and have been about every 3-4 minutes for about an hour    If it is your second labor or more, call when contractions are strong and about every 3-5 minutes or sooner depending on your level of discomfort.     Keep in mind we are always here for you! If you have questions, concerns please don't hesitate to call us.     What to eat/drink in labor: Drink plenty of fluid (water most importantly, juice, soda or tea without caffeine). Eat rice, pasta, soup, cereal, bread/toast, and fruit. Avoid dairy and greasy food as they are difficult to digest and you may experience some nausea during labor.    Comfort measures:    Baths and showers (ok even with ruptured membranes, it may temporarily slow contractions if you are still in the early stage of labor)    Warm/hot packs for back pain or discomfort    Back, belly, or thigh massages    Standing, rocking, walking, leaning over bed or tables, side-lying and sleeping    Miscellaneous:     Contractions are timed from the beginning of one to the beginning of the next    Try hard to sleep during the early stage of labor when you are not that uncomfortable. Timing of contractions at this point is not important    Even if you cannot sleep, resting in bed or on the couch can help you maintain your energy for labor    When you arrive at the hospital the nurse will check your baby's heartbeat, check your cervix, and will call us. The midwife on call will come in and be with you when you are in active labor    After hours you need to enter the hospital through the emergency room  Fetal Kick Counts    It is important to know when your baby's movements occur. We often get busy with work and life and do not pay close attention to their movements.        Women typically begin feeling movement between 18-22 weeks of " gestation, sometimes it can be earlier or later depending on where your placenta is       Movements usually begin feeling like popping or fluttering and as the baby grows they become more pronounced    Toward the end of pregnancy as the baby gets larger they may not move as much or make as big of movements. Babies have maturing sleep cycles as well as not as much room to move and flip. If you are ever concerned about your baby's movements or have not felt the baby move for a while, we recommend you do a fetal kick count. Prior to starting your count drink a glass of water or juice and eat a snack. Then lay down on your side and begin to count movements.     How to do a Fetal Kick Counts    There are many different ways to monitor your baby's movements. Movements can range from large jabs to small kicks, or wiggles.  Hiccups count!      Count 10 movements in 2 hours when resting and focusing    Count 10 movements in 12 hours when doing normal activity    We recommend that if movements occur but seem decreased that you should be seen in the clinic or hospital for evaluation within 12 hours. If fetal movement is absent or fetal kick counts are low please contact us right away.    If you ever have any concerns about your baby's movements DO NOT HESITATE to call us, we are here for you!    [facility]  [phone]      GROUP B STREP    Group B Strep (GBS) is a common bacteria that is sometimes found in the vagina, urinary tract or rectum.  It is not harmful typically to adults but can cause serious illness in newborns.  It occasionally is passed from mother to baby during birth.   It is important to test in pregnancy.  When a woman is found to be positive for GBS, either at the first prenatal visit or by taking a culture at 36 weeks, treatment will be offered to reduce the chance of spreading the bacteria to the baby.       Treatment consists of either oral antibiotics early in pregnancy or antibiotics  given by IV during labor if testing is positive at 36 weeks.      Even without treatment the baby rarely (1-2% of the time) gets infected.  With treatment the baby almost never gets infected.       There really isn't anything you can do to keep from getting or being positive for GBS.  It isn't sexually transmitted and there are no symptoms if you are positive.       Your midwife will discuss your results with you and make recommendations for treatment.

## 2019-07-18 NOTE — PROGRESS NOTES
"Feels tired but \"good\".  Here alone today.   Fetal movement: positive  Denies bleeding/lof, no contractions  GBS swab done today  Vaginal exam done, confirmed vertex and vertex via bedside US  Cx:  FT/50%/midposition/soft   Labor instructions given  Breast pump Rx no, not asked    Warning signs reviewed  Patient denies S&S of pre-eclampsia and aware of what to report   Return to clinic 1 week    Taina GREEN, FAYE    **ask about breast pump**          "

## 2019-07-19 LAB
GP B STREP DNA SPEC QL NAA+PROBE: NEGATIVE
SPECIMEN SOURCE: NORMAL

## 2019-07-21 ENCOUNTER — NURSE TRIAGE (OUTPATIENT)
Dept: NURSING | Facility: CLINIC | Age: 24
End: 2019-07-21

## 2019-07-22 NOTE — TELEPHONE ENCOUNTER
"Patient reports \"This morning woke up with cough since yesterday, and woke up with sore throat. Now my body is aching. No appetite to eat. Having chills, headache, my rectal temperature was 99.0. My head feels heavy. Sometimes when I take a deep breath I need to cough.\" Patient says is sometimes lightheaded when she stands. Says it's not severe, doesn't feel like passing out. Encouraged fluids and rest, and advised if not improved within 2 hours, to be seen by provider. Patient verbalized understanding.     Patient rates headache pain 4-5/10, headache started 1 1/2 hours ago. Has not taken any medication for it yet.     Triaged for cough and headache, Patient asking which medications are safe during pregnancy- care advice reviewed.  Triage recommend home care. Protocol and care advice reviewed.  Caller states understanding of the recommended disposition.  Advised to call back if further questions or concerns.    Suri Quigley RN/[facility]    Reason for Disposition    Caller requesting information about medication during pregnancy; adult is not ill and triager answers question    Additional Information    Negative: Severe difficulty breathing (e.g., struggling for each breath, speaks in single words)    Negative: Bluish (or gray) lips or face now    Negative: [1] Rapid onset of cough AND [2] has hives    Negative: Coughing started suddenly after medicine, an allergic food or bee sting    Negative: [1] Difficulty breathing AND [2] exposure to flames, smoke, or fumes    Negative: [1] Stridor AND [2] difficulty breathing    Negative: Sounds like a life-threatening emergency to the triager    Negative: Chest pain  (Exception: MILD central chest pain, present only when coughing)    Negative: Difficulty breathing    Negative: Patient sounds very sick or weak to the triager    Negative: [1] Coughed up blood AND [2] > 1 tablespoon (15 ml) (Exception: blood-tinged sputum)    Negative: Fever > 103 F (39.4 " C)    Negative: [1] Fever > 101 F (38.3 C) AND [2] age > 60    Negative: [1] Fever > 100.0 F (37.8 C) AND [2] bedridden (e.g., nursing home patient, CVA, chronic illness, recovering from surgery)    Negative: [1] Fever > 100.0 F (37.8 C) AND [2] diabetes mellitus or weak immune system (e.g., HIV positive, cancer chemo, splenectomy, chronic steroids)    Negative: Wheezing is present    Negative: [1] Ankle swelling AND [2] swelling is increasing    Negative: SEVERE coughing spells (e.g., whooping sound after coughing, vomiting after coughing)    Negative: Fever present > 3 days (72 hours)    Negative: [1] Fever returns after gone for over 24 hours AND [2] symptoms worse or not improved    Negative: [1] Using nasal washes and pain medicine > 24 hours AND [2] sinus pain (around cheekbone or eye) persists    Negative: [1] Continuous (nonstop) coughing interferes with work or school AND [2] no improvement using cough treatment per protocol    Negative: Earache is present    Negative: Cough has been present for > 3 weeks    Negative: [1] Nasal discharge AND [2] present > 10 days    Negative: [1] Coughed up blood-tinged sputum AND [2] more than once    Negative: [1] Patient also has allergy symptoms (e.g., itchy eyes, clear nasal discharge, postnasal drip) AND [2] they are acting up    Negative: Taking an ACE Inhibitor medication  (e.g., benazepril/LOTENSIN, captopril/CAPOTEN, enalapril/VASOTEC, lisinopril/ZESTRIL)    Negative: Exposure to TB (Tuberculosis)    Negative: Unable to walk, or can only walk with assistance (e.g., requires support)    Negative: Stiff neck (can't touch chin to chest)    Negative: [1] Weakness of the face, arm or leg on one side of the body AND [2] new onset    Negative: [1] Numbness of the face, arm or leg on one side of the body AND [2] new onset    Negative: Difficult to awaken or acting confused (e.g., disoriented, slurred speech)    Negative: [1] Loss of speech or garbled speech AND [2] new  "onset    Negative: Sounds like a life-threatening emergency to the triager    Negative: Severe pain in one eye    Negative: [1] Other family members (or roommates) with headaches AND [2] possibility of carbon monoxide exposure    Negative: [1] SEVERE headache (e.g., excruciating) AND [2] \"worst headache\" of life    Negative: [1] SEVERE headache AND [2] fever    Negative: [1] SEVERE headache AND [2] sudden-onset (i.e., reaching maximum intensity within seconds)    Negative: Patient sounds very sick or weak to the triager    Negative: [1] Fever > 100.0 F (37.8 C) AND [2] diabetes mellitus or weak immune system (e.g., HIV positive, cancer chemo, splenectomy, chronic steroids)    Negative: Loss of vision or double vision (Exception: similar to previous migraines)    Negative: [1] Pregnant > 20 weeks AND [2] new hand or face swelling    Negative: [1] Pregnant > 20 weeks AND [2] new blurred vision or vision changes    Negative: [1] Pregnant > 20 weeks AND [2] sudden weight gain (i.e., more than 3 lbs or 1.4 kg in one week)    Negative: [1] Pregnant 23 or more weeks AND [2] baby is moving less today (e.g., kick count < 5 in 1 hour or < 10 in 2 hours)    Negative: [1] SEVERE headache (e.g., excruciating) AND [2] not improved after 2 hours of pain medicine (Exception: similar to previous migraines)    Negative: [1] Vomiting AND [2] 2 or more times (Exception: similar to previous migraines)    Negative: Fever > 104 F (40 C)    Negative: [1] MODERATE headache (e.g., interferes with normal activities) AND [2] present > 24 hours AND [3] unexplained  (Exceptions: analgesics not tried, typical migraine, or headache part of viral illness)    Negative: [1] New headache AND [2] HIV positive    Negative: [1] Sinus pain of forehead AND [2] yellow or green nasal discharge    Negative: Fever present > 3 days (72 hours)    Negative: [1] MILD-MODERATE headache AND [2] present > 72 hours    Negative: Headache is a chronic symptom (recurrent " or ongoing AND present > 4 weeks)    Negative: Similar to previously diagnosed migraine headaches    [1] Headache AND [2] part of viral illness AND [3] present < 72 hours    Cough with cold symptoms (e.g., runny nose, postnasal drip, throat clearing)    Cough    [1] Headache AND [2] has not taken pain medications    Protocols used: MEDICATION QUESTION CALL-A-AH, COUGH - ACUTE NON-PRODUCTIVE-A-AH, PREGNANCY - HEADACHE-A-AH

## 2019-07-23 ENCOUNTER — MYC MEDICAL ADVICE (OUTPATIENT)
Dept: MIDWIFE SERVICES | Facility: CLINIC | Age: 24
End: 2019-07-23

## 2019-07-23 NOTE — TELEPHONE ENCOUNTER
Called pt regarding mychart message.  Cough for 3 days and progressively worse. Denies fever.    Recommended OTC guaifenesin and dextromethorphan prn for cough. Increase po fluids and rest. If she needs to stay home from work, recommend.    Baby is moving. Recommended performing daily kick count monitoring and if does not reach 10 kicks/2 hours, call.    Recommended if sx's do not improved to be seen in UC for evaluation.    Has prenatal visit 7/25/19. Encouraged to call with any further questions.    Pt verbalized understanding, in agreement with plan, and voiced no further questions.  Grecia Dixon RN on 7/23/2019 at 9:38 AM

## 2019-07-24 ENCOUNTER — MYC MEDICAL ADVICE (OUTPATIENT)
Dept: MIDWIFE SERVICES | Facility: CLINIC | Age: 24
End: 2019-07-24

## 2019-07-24 NOTE — TELEPHONE ENCOUNTER
37w1d, NIKIA 8/13/19. Pt is now coughing up green mucous. Denies fever. Cough sounds dry on phone. Has been taking the Mucinex. Recommended continue with Mucinex, can take tylenol for the aches and pains. Pt to call if she develops fever. Denies vag bleeding, does have occ contractions. FM present but less than before. Discussed kick counts. Pt has appointment 7/25/19. Offered pt appointment today at [facility]. Pt declined. No further questions.

## 2019-07-25 ENCOUNTER — PRENATAL OFFICE VISIT (OUTPATIENT)
Dept: MIDWIFE SERVICES | Facility: CLINIC | Age: 24
End: 2019-07-25
Payer: COMMERCIAL

## 2019-07-25 VITALS
DIASTOLIC BLOOD PRESSURE: 56 MMHG | WEIGHT: 161 LBS | TEMPERATURE: 98.1 F | SYSTOLIC BLOOD PRESSURE: 108 MMHG | BODY MASS INDEX: 31.44 KG/M2

## 2019-07-25 DIAGNOSIS — Z34.83 SUPERVISION OF NORMAL INTRAUTERINE PREGNANCY IN MULTIGRAVIDA IN THIRD TRIMESTER: ICD-10-CM

## 2019-07-25 PROCEDURE — 99207 ZZC PRENATAL VISIT: CPT | Performed by: NURSE PRACTITIONER

## 2019-07-25 NOTE — PROGRESS NOTES
Feels OK.  States that she has had nasal congestion and dry cough since Saturday.  She has been taking OTC medications and feels like cough and congestion has improved.  Denies any fever, chills, headache/sinus pressure, coughing up colored sputum.    Fetal movement: positive  Denies vaginal bleeding/lof, a little contractions that resolve on own  Warning signs reviewed  Labor signs and symptoms discussed, aware of numbers to call  Denies s/sx of preeclampsia and aware of what to report  Counseled on safe OTC medications, counseled to call clinic or go to UC if cough worsens, any fever/chills, or any headache/sinus pressure  Return to clinic 1 week    Taina GREEN CNM    **ask about birth control for after baby**

## 2019-07-25 NOTE — PATIENT INSTRUCTIONS
"PREECLAMPSIA SIGNS AND SYMPTOMS    Preeclampsia is a dangerous condition that some women develop in the second half of pregnancy. It can also begin after the baby is born.  Preeclampsia causes high blood pressure and can cause problems with many organ systems in your body.  It can also affect the growth of your baby. The exact cause of preeclampsia is unknown, however, there are signs and symptoms to watch for:    -A bad headache that doesn't improve with Tylenol  -Visual changes such as spots, flashes of light, blurry vision  -Pain in the upper right part of your abdomen, especially under the ribs that doesn't go away  -Nausea and/or vomiting  -Feeling extremely tired  -Yellowing of the skin and/or eyes  -Feeling \"not quite right\" or that something is wrong  -An extreme amount of swelling (some swelling in pregnancy is very normal)    If your midwife feels that you are developing preeclampsia, you will have lab tests drawn and will be monitored very closely.     If you are experiencing anyof these symptoms, call the [facility] immediately at [phone].  Labor Instructions for Midwife Patients    When to call:  Both during and after office hours call  [phone]. There is a triage RN to take your calls and answer your questions 24 hours a day.  If she cannot answer your question she will page the midwife on call for you.    When to call:  Call anytime you have important concerns about you or your baby.     Call if:    You are having contractions at regular intervals about 5-6 minutes apart lasting 30-60 seconds and becoming increasingly more intense     You have an uncontrollable gush of fluid from your vagina or feel a pop and gush like your water has broken    You have HEAVY bleeding, like heavy period, blood running down your legs, or  soaking a pad.     Some bleeding after a pelvic exam, after intercourse, or in labor when your cervix is dilating is normal and is referred to as \"bloody " "show\"    You have severe, continuous back or abdominal pain    You feel it is time to go to the hospital    If this is your first labor, call when contractions are very intense and have been about every 3-4 minutes for about an hour    If it is your second labor or more, call when contractions are strong and about every 3-5 minutes or sooner depending on your level of discomfort.     Keep in mind we are always here for you! If you have questions, concerns please don't hesitate to call us.     What to eat/drink in labor: Drink plenty of fluid (water most importantly, juice, soda or tea without caffeine). Eat rice, pasta, soup, cereal, bread/toast, and fruit. Avoid dairy and greasy food as they are difficult to digest and you may experience some nausea during labor.    Comfort measures:    Baths and showers (ok even with ruptured membranes, it may temporarily slow contractions if you are still in the early stage of labor)    Warm/hot packs for back pain or discomfort    Back, belly, or thigh massages    Standing, rocking, walking, leaning over bed or tables, side-lying and sleeping    Miscellaneous:     Contractions are timed from the beginning of one to the beginning of the next    Try hard to sleep during the early stage of labor when you are not that uncomfortable. Timing of contractions at this point is not important    Even if you cannot sleep, resting in bed or on the couch can help you maintain your energy for labor    When you arrive at the hospital the nurse will check your baby's heartbeat, check your cervix, and will call us. The midwife on call will come in and be with you when you are in active labor    After hours you need to enter the hospital through the emergency room  Fetal Kick Counts    It is important to know when your baby's movements occur. We often get busy with work and life and do not pay close attention to their movements.        Women typically begin feeling movement between 18-22 weeks of " gestation, sometimes it can be earlier or later depending on where your placenta is       Movements usually begin feeling like popping or fluttering and as the baby grows they become more pronounced    Toward the end of pregnancy as the baby gets larger they may not move as much or make as big of movements. Babies have maturing sleep cycles as well as not as much room to move and flip. If you are ever concerned about your baby's movements or have not felt the baby move for a while, we recommend you do a fetal kick count. Prior to starting your count drink a glass of water or juice and eat a snack. Then lay down on your side and begin to count movements.     How to do a Fetal Kick Counts    There are many different ways to monitor your baby's movements. Movements can range from large jabs to small kicks, or wiggles.  Hiccups count!      Count 10 movements in 2 hours when resting and focusing    Count 10 movements in 12 hours when doing normal activity    We recommend that if movements occur but seem decreased that you should be seen in the clinic or hospital for evaluation within 12 hours. If fetal movement is absent or fetal kick counts are low please contact us right away.    If you ever have any concerns about your baby's movements DO NOT HESITATE to call us, we are here for you!    [facility]  [phone]      Over-the-counter (OTC) medications during pregnancy    Make sure to follow package directions for dosing and information unless otherwise noted on the list.    Morning sickness/nausea:      Unisom (doxylamine) 12.5 mg (1/2 tab) and Vitamin B6 25-50 mg three times daily. Unisom may cause some drowsiness as it is typically used for sleep. The combination of these medications can be very effective but they can also be taken separately    Dramamine (Dimenhydrinate) 25-50 mg every 4-6 hours as needed    Benadryl (diphenhydramine) 25-50 mg every 4-6 hours     Ginger tablets 1000 mg per day in  divided doses    Constipation:      Colace (Docusate sodium)    Metamucil    Citrucel    Milk of magnesia    Fibercon    Miralax (if all other methods have failed)    Diarrhea:    Imodium (loperamide)    Heartburn:      Zantac (ranitidine)    Pepcid (famotidine)    Prilosec (omeprazole)    Antacids-Tums, Maalox (liquid or tablets), Rolaids  Pepto Bismol and Linda Arroyo are NOT RECOMMENDED for use during pregnancy because they contain aspirin    Hemorrhoids:      Tucks pads/ointment    Anusol/Anusol-HC    Preparation H    Gas Pain  Simethicone (Gas-X, Mylanta Gas, Mylicon)      Cough, cold, and congestion:      Robitussin (dextromethorphan) and Robitussin DM (dextromethorphan and guaifenesin)    Cough drops/zinc lozenges    Mucinex    Sudafed (after 16 weeks gestation)    Vicks Vapo rub  Cough medicine with alcohol like Nyquil is not recommended during pregnancy    Headache:      Acetaminophen (Tylenol) 650 mg every 4-6 hours or 1000 mg every 6 hours, do not exceed 4000 mg in 24 hours   Ibuprofen (Advil/Motrin) and Naproxen (Aleve) are not recommended during the 1st or 3rd trimester of pregnancy    Allergy:      Benadryl (diphenhydramine)    Zyrtec (cetirizine)    Claritin (loratadine)    Rash/Itching:      Benadryl lotion    Cortaid cream (Hydrocortisone cream)    Benadryl (diphenhydramine)    Vaginal yeast infection:      Monistat 3 or 7 day    Gyne-Lotrimin    Acne:      Benzoyl Peroxide    Salicylic acid     If you have questions or concerns about any medications that are available OTC or you are unsure if something is safe call:    [facility]  [phone]

## 2019-07-29 NOTE — PROGRESS NOTES
Feels well, no concerns. Hoping she comes soon. She was 38w4d with her son and 40w with her daughter. Some lemuel rebolledo contractions.   Fetal movement: positive  Denies vaginal bleeding/lof, some contractions  SVE 3/50/-1 very posterior, soft  Warning signs reviewed   Labor signs and symptoms discussed, aware of numbers to call  Denies s/sx of pre-eclampsia and aware of what to report  Return to clinic 1 week    PETER Shelby, CNM

## 2019-07-30 ENCOUNTER — TELEPHONE (OUTPATIENT)
Dept: OBGYN | Facility: CLINIC | Age: 24
End: 2019-07-30

## 2019-07-30 ENCOUNTER — TELEPHONE (OUTPATIENT)
Dept: MIDWIFE SERVICES | Facility: CLINIC | Age: 24
End: 2019-07-30

## 2019-07-30 NOTE — TELEPHONE ENCOUNTER
Pt is having BH contractions. Contractions are not painful. Feeling stomach tightening. Pt has been feeling all day. Last hour pt had 5 or more. Has not been timing contractions. Is keeping well hydrated. Has been up and moving around. Denies vag bleeding or LOF. FM present. Both labors were very fast. Last SVe was FT/50/-1. Pt informed to go to [facility] to be checked out. Pt hesistant to go. Contractions do not hurt. Will send to [facility] to advise.

## 2019-07-30 NOTE — TELEPHONE ENCOUNTER
"Return call to Pa. States she thinks she has been having lemuel rebolledo ctxs all day. Hadn't been timing ctxs, but noticed between 1658-1846 she had 5-6 ctxs. States working all day today, and had been walking and sitting at work. Is staying well hydrated. Denies LOF or vaginal bleeding, positive FM. Contractions \"do not hurt at all.\" Is at home now lying down and is still experiencing the \"tightenings.\" States \"tightening lasts only 20 seconds.     Has a history of a fast (4hour) labor, lives less than 15 minutes from hospital. Offered for her to be seen in [facility] for evaluation or monitor at home. Pa would like to stay at home as she states these ctxs are not painful. Labor instructions given for when to call back. Warning s/sx discussed and when to call.     Mabel GREEN CNM    "

## 2019-08-01 ENCOUNTER — PRENATAL OFFICE VISIT (OUTPATIENT)
Dept: MIDWIFE SERVICES | Facility: CLINIC | Age: 24
End: 2019-08-01
Payer: COMMERCIAL

## 2019-08-01 VITALS — SYSTOLIC BLOOD PRESSURE: 112 MMHG | DIASTOLIC BLOOD PRESSURE: 68 MMHG | BODY MASS INDEX: 31.72 KG/M2 | WEIGHT: 162.4 LBS

## 2019-08-01 DIAGNOSIS — Z34.83 SUPERVISION OF NORMAL INTRAUTERINE PREGNANCY IN MULTIGRAVIDA IN THIRD TRIMESTER: Primary | ICD-10-CM

## 2019-08-01 PROCEDURE — 99207 ZZC PRENATAL VISIT: CPT | Performed by: ADVANCED PRACTICE MIDWIFE

## 2019-08-03 ENCOUNTER — HOSPITAL ENCOUNTER (INPATIENT)
Facility: CLINIC | Age: 24
LOS: 1 days | Discharge: HOME | End: 2019-08-05
Attending: ADVANCED PRACTICE MIDWIFE | Admitting: ADVANCED PRACTICE MIDWIFE
Payer: COMMERCIAL

## 2019-08-03 ENCOUNTER — NURSE TRIAGE (OUTPATIENT)
Dept: NURSING | Facility: CLINIC | Age: 24
End: 2019-08-03

## 2019-08-03 PROCEDURE — G0463 HOSPITAL OUTPT CLINIC VISIT: HCPCS

## 2019-08-03 RX ORDER — ONDANSETRON 2 MG/ML
4 INJECTION INTRAMUSCULAR; INTRAVENOUS EVERY 6 HOURS PRN
Status: DISCONTINUED | OUTPATIENT
Start: 2019-08-03 | End: 2019-08-05 | Stop reason: HOSPADM

## 2019-08-04 LAB
ABO + RH BLD: NORMAL
ABO + RH BLD: NORMAL
BASOPHILS # BLD AUTO: 0 10E9/L (ref 0–0.2)
BASOPHILS NFR BLD AUTO: 0.2 %
BLD GP AB SCN SERPL QL: NORMAL
BLOOD BANK CMNT PATIENT-IMP: NORMAL
DIFFERENTIAL METHOD BLD: ABNORMAL
EOSINOPHIL # BLD AUTO: 0.2 10E9/L (ref 0–0.7)
EOSINOPHIL NFR BLD AUTO: 1.2 %
ERYTHROCYTE [DISTWIDTH] IN BLOOD BY AUTOMATED COUNT: 13 % (ref 10–15)
HCT VFR BLD AUTO: 38.9 % (ref 35–47)
HGB BLD-MCNC: 12.9 G/DL (ref 11.7–15.7)
IMM GRANULOCYTES # BLD: 0.3 10E9/L (ref 0–0.4)
IMM GRANULOCYTES NFR BLD: 1.9 %
LYMPHOCYTES # BLD AUTO: 2.5 10E9/L (ref 0.8–5.3)
LYMPHOCYTES NFR BLD AUTO: 16.3 %
MCH RBC QN AUTO: 29.6 PG (ref 26.5–33)
MCHC RBC AUTO-ENTMCNC: 33.2 G/DL (ref 31.5–36.5)
MCV RBC AUTO: 89 FL (ref 78–100)
MONOCYTES # BLD AUTO: 1.4 10E9/L (ref 0–1.3)
MONOCYTES NFR BLD AUTO: 9 %
NEUTROPHILS # BLD AUTO: 11.1 10E9/L (ref 1.6–8.3)
NEUTROPHILS NFR BLD AUTO: 71.4 %
NRBC # BLD AUTO: 0 10*3/UL
NRBC BLD AUTO-RTO: 0 /100
PLATELET # BLD AUTO: 260 10E9/L (ref 150–450)
RBC # BLD AUTO: 4.36 10E12/L (ref 3.8–5.2)
SPECIMEN EXP DATE BLD: NORMAL
T PALLIDUM AB SER QL: NONREACTIVE
WBC # BLD AUTO: 15.5 10E9/L (ref 4–11)

## 2019-08-04 PROCEDURE — G0463 HOSPITAL OUTPT CLINIC VISIT: HCPCS

## 2019-08-04 PROCEDURE — 12000035 ZZH R&B POSTPARTUM

## 2019-08-04 PROCEDURE — 25000128 H RX IP 250 OP 636: Performed by: ADVANCED PRACTICE MIDWIFE

## 2019-08-04 PROCEDURE — 86780 TREPONEMA PALLIDUM: CPT | Performed by: ADVANCED PRACTICE MIDWIFE

## 2019-08-04 PROCEDURE — 86901 BLOOD TYPING SEROLOGIC RH(D): CPT | Performed by: ADVANCED PRACTICE MIDWIFE

## 2019-08-04 PROCEDURE — 59400 OBSTETRICAL CARE: CPT | Performed by: ADVANCED PRACTICE MIDWIFE

## 2019-08-04 PROCEDURE — 59025 FETAL NON-STRESS TEST: CPT | Mod: 26 | Performed by: ADVANCED PRACTICE MIDWIFE

## 2019-08-04 PROCEDURE — 86850 RBC ANTIBODY SCREEN: CPT | Performed by: ADVANCED PRACTICE MIDWIFE

## 2019-08-04 PROCEDURE — 72200001 ZZH LABOR CARE VAGINAL DELIVERY SINGLE

## 2019-08-04 PROCEDURE — 86900 BLOOD TYPING SEROLOGIC ABO: CPT | Performed by: ADVANCED PRACTICE MIDWIFE

## 2019-08-04 PROCEDURE — 36415 COLL VENOUS BLD VENIPUNCTURE: CPT | Performed by: ADVANCED PRACTICE MIDWIFE

## 2019-08-04 PROCEDURE — 25800030 ZZH RX IP 258 OP 636: Performed by: ADVANCED PRACTICE MIDWIFE

## 2019-08-04 PROCEDURE — 25000125 ZZHC RX 250: Performed by: ADVANCED PRACTICE MIDWIFE

## 2019-08-04 PROCEDURE — 85025 COMPLETE CBC W/AUTO DIFF WBC: CPT | Performed by: ADVANCED PRACTICE MIDWIFE

## 2019-08-04 RX ORDER — BISACODYL 10 MG
10 SUPPOSITORY, RECTAL RECTAL DAILY PRN
Status: DISCONTINUED | OUTPATIENT
Start: 2019-08-06 | End: 2019-08-05 | Stop reason: HOSPADM

## 2019-08-04 RX ORDER — FENTANYL CITRATE 50 UG/ML
50-100 INJECTION, SOLUTION INTRAMUSCULAR; INTRAVENOUS
Status: DISCONTINUED | OUTPATIENT
Start: 2019-08-04 | End: 2019-08-05 | Stop reason: HOSPADM

## 2019-08-04 RX ORDER — ACETAMINOPHEN 325 MG/1
650 TABLET ORAL EVERY 4 HOURS PRN
Status: DISCONTINUED | OUTPATIENT
Start: 2019-08-04 | End: 2019-08-05 | Stop reason: HOSPADM

## 2019-08-04 RX ORDER — AMOXICILLIN 250 MG
1 CAPSULE ORAL 2 TIMES DAILY
Status: DISCONTINUED | OUTPATIENT
Start: 2019-08-04 | End: 2019-08-05 | Stop reason: HOSPADM

## 2019-08-04 RX ORDER — OXYTOCIN/0.9 % SODIUM CHLORIDE 30/500 ML
340 PLASTIC BAG, INJECTION (ML) INTRAVENOUS CONTINUOUS PRN
Status: DISCONTINUED | OUTPATIENT
Start: 2019-08-04 | End: 2019-08-05 | Stop reason: HOSPADM

## 2019-08-04 RX ORDER — NALOXONE HYDROCHLORIDE 0.4 MG/ML
.1-.4 INJECTION, SOLUTION INTRAMUSCULAR; INTRAVENOUS; SUBCUTANEOUS
Status: DISCONTINUED | OUTPATIENT
Start: 2019-08-04 | End: 2019-08-05 | Stop reason: HOSPADM

## 2019-08-04 RX ORDER — LANOLIN 100 %
OINTMENT (GRAM) TOPICAL
Status: DISCONTINUED | OUTPATIENT
Start: 2019-08-04 | End: 2019-08-05 | Stop reason: HOSPADM

## 2019-08-04 RX ORDER — OXYTOCIN 10 [USP'U]/ML
10 INJECTION, SOLUTION INTRAMUSCULAR; INTRAVENOUS
Status: DISCONTINUED | OUTPATIENT
Start: 2019-08-04 | End: 2019-08-05 | Stop reason: HOSPADM

## 2019-08-04 RX ORDER — SODIUM CHLORIDE, SODIUM LACTATE, POTASSIUM CHLORIDE, CALCIUM CHLORIDE 600; 310; 30; 20 MG/100ML; MG/100ML; MG/100ML; MG/100ML
INJECTION, SOLUTION INTRAVENOUS CONTINUOUS
Status: DISCONTINUED | OUTPATIENT
Start: 2019-08-04 | End: 2019-08-05 | Stop reason: HOSPADM

## 2019-08-04 RX ORDER — IBUPROFEN 400 MG/1
800 TABLET, FILM COATED ORAL EVERY 6 HOURS PRN
Status: DISCONTINUED | OUTPATIENT
Start: 2019-08-04 | End: 2019-08-05 | Stop reason: HOSPADM

## 2019-08-04 RX ORDER — CARBOPROST TROMETHAMINE 250 UG/ML
250 INJECTION, SOLUTION INTRAMUSCULAR
Status: DISCONTINUED | OUTPATIENT
Start: 2019-08-04 | End: 2019-08-05 | Stop reason: HOSPADM

## 2019-08-04 RX ORDER — IBUPROFEN 400 MG/1
800 TABLET, FILM COATED ORAL
Status: DISCONTINUED | OUTPATIENT
Start: 2019-08-04 | End: 2019-08-05 | Stop reason: HOSPADM

## 2019-08-04 RX ORDER — ONDANSETRON 2 MG/ML
4 INJECTION INTRAMUSCULAR; INTRAVENOUS EVERY 6 HOURS PRN
Status: DISCONTINUED | OUTPATIENT
Start: 2019-08-04 | End: 2019-08-05 | Stop reason: HOSPADM

## 2019-08-04 RX ORDER — AMOXICILLIN 250 MG
2 CAPSULE ORAL 2 TIMES DAILY
Status: DISCONTINUED | OUTPATIENT
Start: 2019-08-04 | End: 2019-08-05 | Stop reason: HOSPADM

## 2019-08-04 RX ORDER — OXYCODONE AND ACETAMINOPHEN 5; 325 MG/1; MG/1
1 TABLET ORAL
Status: DISCONTINUED | OUTPATIENT
Start: 2019-08-04 | End: 2019-08-05 | Stop reason: HOSPADM

## 2019-08-04 RX ORDER — OXYTOCIN/0.9 % SODIUM CHLORIDE 30/500 ML
100 PLASTIC BAG, INJECTION (ML) INTRAVENOUS CONTINUOUS
Status: DISCONTINUED | OUTPATIENT
Start: 2019-08-04 | End: 2019-08-05 | Stop reason: HOSPADM

## 2019-08-04 RX ORDER — LIDOCAINE 40 MG/G
CREAM TOPICAL
Status: DISCONTINUED | OUTPATIENT
Start: 2019-08-04 | End: 2019-08-05 | Stop reason: HOSPADM

## 2019-08-04 RX ORDER — METHYLERGONOVINE MALEATE 0.2 MG/ML
200 INJECTION INTRAVENOUS
Status: DISCONTINUED | OUTPATIENT
Start: 2019-08-04 | End: 2019-08-05 | Stop reason: HOSPADM

## 2019-08-04 RX ORDER — MISOPROSTOL 200 UG/1
400 TABLET ORAL
Status: DISCONTINUED | OUTPATIENT
Start: 2019-08-04 | End: 2019-08-05 | Stop reason: HOSPADM

## 2019-08-04 RX ORDER — HYDROCORTISONE 2.5 %
CREAM (GRAM) TOPICAL 3 TIMES DAILY PRN
Status: DISCONTINUED | OUTPATIENT
Start: 2019-08-04 | End: 2019-08-05 | Stop reason: HOSPADM

## 2019-08-04 RX ORDER — OXYTOCIN/0.9 % SODIUM CHLORIDE 30/500 ML
100-340 PLASTIC BAG, INJECTION (ML) INTRAVENOUS CONTINUOUS PRN
Status: COMPLETED | OUTPATIENT
Start: 2019-08-04 | End: 2019-08-04

## 2019-08-04 RX ADMIN — OXYTOCIN 340 ML/HR: 10 INJECTION INTRAVENOUS at 01:25

## 2019-08-04 RX ADMIN — OXYTOCIN-SODIUM CHLORIDE 0.9% IV SOLN 30 UNIT/500ML 100 ML/HR: 30-0.9/5 SOLUTION at 01:57

## 2019-08-04 ASSESSMENT — MIFFLIN-ST. JEOR: SCORE: 1396.79

## 2019-08-04 NOTE — TELEPHONE ENCOUNTER
"Pregnant 38.5 weeks with history of fast labor.  Contractions 8-10 minutes apart and having pelvic pressure.  Paged CNM on call to [phone] and will go to [facility].    Cindi Vazquez RN  [facility]      Reason for Disposition    [1] History of rapid prior delivery AND [2] contractions < 10 minutes apart    Additional Information    Negative: Passed out (i.e., lost consciousness, collapsed and was not responding)    Negative: Shock suspected (e.g., cold/pale/clammy skin, too weak to stand, low BP, rapid pulse)    Negative: Difficult to awaken or acting confused (e.g., disoriented, slurred speech)    Negative: [1] SEVERE abdominal pain (e.g., excruciating) AND [2] constant AND [3] present > 1 hour    Negative: Severe bleeding (e.g., continuous red blood from vagina, or large blood clots)    Negative: Umbilical cord hanging out of the vagina (shiny, white, curled appearance, \"like telephone cord\")    Negative: Uncontrollable urge to push (i.e., feels like baby is coming out now)    Negative: Can see baby    Negative: Sounds like a life-threatening emergency to the triager    Negative: [1] First baby (primipara) AND [2] contractions < 6 minutes apart  AND [3] present 2 hours    Negative: [1] History of prior delivery (multipara) AND [2] contractions < 10 minutes apart AND [3] present 1 hour    Protocols used: PREGNANCY - LABOR-A-      "

## 2019-08-04 NOTE — L&D DELIVERY NOTE
Delivery Summary    IUP at 38w5d gestation delivered on 2019.     delivery of a viable Female infant.  Apgars of 8 at 1 minute and 9 at 5 minutes.  NNP present for delivery? No.  Labor was spontaneous.    Medications administered  in labor:  Pain Rx: none. Antibiotics: No.  Perineum: Intact. Repaired: No.   Delayed cord clamping: Yes, cord blood obtained  Placenta-mechanism: spontaneous, intact,  with a 3 vessel cord. Trailing membranes teased out using a ring forcep. Manual sweep of uterus done digitally using a lap sponge. IV oxytocin was given.  Quantitative Blood Loss:  100cc's  Complications of pregnancy, labor and delivery: None.        Pa Kev Garcia MRN# [id number]   Age: 24 year old YOB: 1995        Labor Event Times    Labor onset date:  8/3/19 Onset time:   7:00 PM   Dilation complete date:  19 Complete time:   1:10 AM   Start pushing date/time:  2019 0115      Labor Events     labor?:  No   steroids:  None  Labor Type:  Spontaneous  Predominate monitoring during 1st stage:  intermittent auscultation     Antibiotics received during labor?:  No     Rupture date/time: 19 011   Rupture type:  Spontaneous rupture of membranes occuring during spontaneous labor or augmentation  Fluid color:  Clear  Fluid odor:  Normal     1:1 continuous labor support provided by?:  RN, provider       Delivery/Placenta Date and Time    Delivery Date:  19 Delivery Time:   1:21 AM   Placenta Date/Time:  2019  1:27 AM     Vaginal Counts     Initial count performed by 2 team members:   Two Team Members   Maritza Latif       Needles Suture [place] Sponges Instruments   Initial counts 2 0 5    Added to count 0 0 0    Final counts 2 0 5    Placed during labor Accounted for at the end of labor   NA NA   NA NA   NA NA    Final count performed by 2 team members:   Two Team Members   Wale Martinez      Final count correct?:  Yes     Apgars     Living status:  Living   1 Minute 5 Minute 10 Minute 15 Minute 20 Minute   Skin color: 0  1       Heart rate: 2  2       Reflex irritability: 2  2       Muscle tone: 2  2       Respiratory effort: 2  2       Total: 8  9       Apgars assigned by:  REGAN WALDRON     Cord     Complications:  None   Cord Blood Disposition:  Discard Gases Sent?:  No      [place] Resuscitation    Methods:  None   Care at Delivery:  Spontaneous respiratory effort with quick pink to color. To moms abdomen and delivery. Dry and bulb suction     Labor Events and Shoulder Dystocia    Fetal Tracing Comments:  Dop tone minutes prior to birth FHR 140s  Shoulder dystocia present?:  Neg     Delivery (Maternal) (Provider to Complete) ([id number])    Perineal lacerations:  None    Vaginal laceration?:  No    Cervical laceration?:  No       Blood Loss  Mother: Jose, Pa [facility] #[id number]   Start of Mother's Information    IO Blood Loss  19 1900 - 19 0149    Total QBL Blood Loss (mL) Hospital Encounter 100 mL    Total  100 mL         End of Mother's Information  Mother: Kd Garcia [facility] #[id number]         Delivery - Provider to Complete ([id number])    Delivering clinician:  Mbael Latif APRN CNM  CNM Care:  Any CNM care in labor, Exclusive CNM care in labor  Attempted Delivery Types (Choose all that apply):  Spontaneous Vaginal Delivery  Delivery Type (Choose the 1 that will go to the Birth History):  Vaginal, Spontaneous   Other personnel:   Provider Role   Mabel Latif APRN CNM Certified Nurse Midwife   Maritza Martinez RN Registered Nurse   Mali Wilkes RN Registered Nurse   Elsa Gaudalupe RN Registered Nurse         Placenta    Delayed Cord Clamping:  Done  Date/Time:  2019  1:27 AM  Removal:  Spontaneous  Comments:  3V, intact  Disposition:  Hospital disposal     Anesthesia    Method:  None          Presentation and Position    Presentation:  Vertex  Position:  Left Occiput Anterior         ASSESSMENT &  PLAN:  ,  on 19, infant girl  Plans to breast and bottle feed   Lactation consult  Hgb PPD#1  Routine PP cares  Discharge on PPD #1 or #2    PETER Bronson CNM

## 2019-08-04 NOTE — LACTATION NOTE
Initial Lactation visit. Hand out given. Recommend unlimited, frequent breast feedings: At least 8 - 12 times every 24 hours. Avoid pacifiers and supplementation with formula unless medically indicated. Explained benefits of holding baby skin on skin to help promote better breastfeeding outcomes.     Pa plans to breast and bottle feed. Recommended she put baby to breast first then offer bottle if needed. Discussed that baby may prefer bottle over breast if breastfeeding isn't well established yet. Encouraged Pa to call staff for latch checks and assist with feedings as needed. Pa appreciative of my visit. Will revisit as needed.     Cielo Fuchs RN IBCLC

## 2019-08-04 NOTE — PROGRESS NOTES
James Gleason CNM Progress Note: Postpartum Day of Delivery    2019  7:54 AM    SUBJECTIVE:  Patient is stable and is tolerating acitivity well  Baby is rooming in  Complications since 2 hours post delivery: None  Pain is well controlled.  Patient is not taking pain medications.  Breastfeeding status:initiated and breastfeeding with formula. Baby has latched on,  supplementation per patient request   Elimination:  She is voiding without difficulty.  She has not had a bowel movement  Desired contraception Not addressed  Denies heavy bleeding and passing large clots.  Feels good about birth experience.    OBJECTIVE:  /69   Pulse 82   Temp 98.2  F (36.8  C) (Oral)   Resp 16   Ht 1.524 m (5')   Wt 73 kg (161 lb)   LMP 2018   Breastfeeding? Unknown   BMI 31.44 kg/m      Constitutional: healthy, alert, no distress and cooperative    Breasts: Currently breastfeeding    Fundus: Uterine fundus is firm, non-tender and at 1 below the level of the umbilicus    Perineum: Perineum is intact and/or well approximated, minimal swelling    Lochia: Lochia is appropriate for the duration of time since delivery.     ASSESSMENT:  , , infant girl 2019 at 0121  Doing well  No excessive bleeding  Pain well-controlled  Lactating mother    Hemoglobin   Date Value Ref Range Status   2019 12.9 11.7 - 15.7 g/dL Final       PLAN:  Continue routine care  Hemoglobin at 19  Lactation consultation  Anticipated discharge: Would like to go home on PPD #1        PETER Bronson CNM

## 2019-08-04 NOTE — PLAN OF CARE
Data: Kd Angulo Jose transferred to 425 via wheelchair at 0345. Baby transferred via parent's arms.  Action: Receiving unit notified of transfer: Yes. Patient and family notified of room change. Report given to Emilia BUNN RN at 0345. Belongings sent to receiving unit. Accompanied by Registered Nurse. Oriented patient to surroundings. Call light within reach. ID bands double-checked with receiving RN.  Response: Patient tolerated transfer and is stable.

## 2019-08-04 NOTE — PLAN OF CARE
Pt delivered a viable female infant, unmedicated . SROM with pushing. Apgars 8/9, NICU RN present to second. Mabel Parks at bedside for delivery. See delivery note for more information. VSS. Denies need for pain meds. Perineum intact. Fundus firm, few clots and light-moderate rubra. Pt to transfer to post partum with baby. Will continue to monitor per POC.

## 2019-08-04 NOTE — H&P
[facility] Labor Admission History & Physical    Pa Kev Garcia is a 24 year old  with an IUP at 38w5d  ; ,   Partner/support Person: Natasha  Language Barrier: English  [facility]  Provider: CNCHUYITA's    Pa Kev Garcia is admitted to the Birth[facility] at [facility] on 2019 at 12:09 AM       History of present inllness/Chief Complaint: Call back to Pa at 2230 after page by FNA. Pa states she had been ctx'ing all day and around ~2100 ctxs became regular at  8-10 minutes apart and increasing in intensity. Denies LOF, vaginal bleeding and positive FM. Has a history of fast labors at 38w4d, Decided she wanted to stay at home for a bit before coming to the hospital. Presented to the [facility] at ~0000  Here with: spontaneous onset of labor  Patient reports contractions are Regular.         Baby active Yes  Membranes are intact.  Bloody show No   Any changes with medical history since last prenatal visit No    Obstetrical history  Estimated Date of Delivery: Aug 13, 2019 determined by LMP  Patient's last menstrual period was 2018.   Dating U/S: 01/15/2019    Fetal anatomic survey: Normal  Placenta: Posterior    PRENATAL COURSE  Prenatal care began at 10 wks gestation for a total of 9 prenatal visits.  Total wt gain 30lb; There is no height or weight on file to calculate BMI.  Prenatal Blood Pressure: WNL  Prenatal course was   complicated by    Patient Active Problem List    Diagnosis Date Noted     Indication for care in labor or delivery 2019     Priority: Medium     Need for MMR vaccine 2019     Priority: Medium     Possible recent exposure to tuberculosis 2019     Priority: Medium     On 2019 at clinicals at [facility].  No symptoms.  Recommended Pa speak to Employee nehal at [facility]. Consider TB Gold.        Supervision of normal intrauterine pregnancy in multigravida 01/15/2019     Priority: Medium     FOB: Foua NIKIA: Aug 13, 2019   O+/Posterior/ Girl  Genetic: innatal neg   Tdap:7/10/19          Flu:  done         GBS:     PAP PP   History fast labor-4 hr  1 Hr GCT/Hgb: passed 107, 11.3               Papanicolaou smear of cervix with atypical squamous cells of undetermined significance (ASC-US) 05/25/2016     Priority: Medium     5/25/16: Pap - ASCUS. Plan pap in 1 year (20 years old).   03/2017 patient report negative       Not immune to rubella 10/09/2015     Priority: Medium     Tdap: 7/10/2019  Rhogam: NA O+    Patient Active Problem List   Diagnosis     Not immune to rubella     Papanicolaou smear of cervix with atypical squamous cells of undetermined significance (ASC-US)     Supervision of normal intrauterine pregnancy in multigravida     Possible recent exposure to tuberculosis     Need for MMR vaccine     Indication for care in labor or delivery       HISTORY  No Known Allergies  Past Medical History:   Diagnosis Date     Intrinsic eczema onset in winter since 12 y/o but in remission till recent delivery in 11-17 12/14/2017     Papanicolaou smear of cervix with atypical squamous cells of undetermined significance (ASC-US) 5/25/16     Past Surgical History:   Procedure Laterality Date     HC OPEN RX DISTAL RADIUS FX, EXTRA-ARTICULAR  2008    left arm fx     Family History   Problem Relation Age of Onset     Unknown/Adopted Mother      C.A.D. Father 70        MI     Hypertension Father      Coronary Artery Disease Father      Cerebrovascular Disease Father      Diabetes No family hx of      Hyperlipidemia No family hx of      Breast Cancer No family hx of      Colon Cancer No family hx of      Prostate Cancer No family hx of      Other Cancer No family hx of      Depression No family hx of      Anxiety Disorder No family hx of      Mental Illness No family hx of      Substance Abuse No family hx of      Anesthesia Reaction No family hx of      Asthma No family hx of      Osteoporosis No family hx of      Genetic Disorder No family hx of       Thyroid Disease No family hx of      Obesity No family hx of      Social History     Tobacco Use     Smoking status: Former Smoker     Smokeless tobacco: Never Used   Substance Use Topics     Alcohol use: No     OB History    Para Term  AB Living   6 2 2 0 3 2   SAB TAB Ectopic Multiple Live Births   2 1 0 0 2      # Outcome Date GA Lbr Josh/2nd Weight Sex Delivery Anes PTL Lv   6 Current            5 Term 17 40w0d 04:18 / 00:05 3.175 kg (7 lb) F Vag-Spont None N MARGARET      Name: SULAIMAN DENISE1 PA      Apgar1: 8  Apgar5: 9   4 SAB 2016     SAB      3 SAB 2016     SAB      2 TAB 2015     TAB      1 Term 12 38w4d 05:42 / 00:09 2.92 kg (6 lb 7 oz) M Vag-Spont Local N MARGARET      Name: JUSTIN DENISE PA      Apgar1: 9  Apgar5: 9       LABS:  Lab Results   Component Value Date    ABO O 01/15/2019    RH Pos 01/15/2019    AS Neg 01/15/2019    HGB 11.3 (L) 2019    HEPBANG Nonreactive 01/15/2019    CHPCRT Negative 01/15/2019    GCPCRT Negative 01/15/2019    TREPAB Negative 2017    RUBELLAABIGG 21 2012    HIV Negative 2012       GBS Status:   Lab Results   Component Value Date    GBS Negative 2019     Rubella: Immune   HIV: Non-Reactive   Platelets:  327  1hr GCT:  107    ROS   Pt is alert and oriented  Pt denies significant constitutional symptoms including fever and/or malaise.    Pt denies significant respiratory, cardiovacular, GI, or muscular/skeletal complaints.    Neuro: Denies HA and visual changes  Muscoloskeletal: Denies except for discomforts r/t pregnancy     PHYSICAL EXAM:  LMP 2018   General appearance:  healthy, alert, active and smiling   Heart: RRR  Lungs: Normal respiratory effort  Abdomen: gravid, single vertex fetus, non-tender, EFW 7 lbs.       Contractions: Pt is lucero every 5-6 minutes, lasting 60 seconds and palpates moderate    Fetal heart tones: Baseline 150  Variability: Moderate   Accelerations: Present  Decelerations: Absent    NST:  reactive    Cervix: -90/ Posterior/ soft/ -2, Vtx  Bloody show: no  Membranes:  Intact    ASSESSMENT:  24 year old  with crowley IUP 38w5d in active labor  NST reactive  GBS negative and membranes intact  Hx of fast labor (4hrs)    PLAN:  Admit - see IP orders  Routine CNM care  Labs ordered: CBC w/dif, anti-treponema, T&S  Teaching done r/t comfort measures, pain management options, and labor processes.  Pain medication: Planning an unmedicated birth, may have nitrous, IV pain meds or epidural if requested  Anticipate     PETER Bronson CNM

## 2019-08-04 NOTE — PLAN OF CARE
VSS Fundus firm, scant flow. Up to the bathroom, voiding in good amounts. IV discontinued. Pt denies the need for pain medication. Up independently in the room. Breastfeeding infant on demand. Also supplementing with similac via bottle. Will continue to monitor.

## 2019-08-04 NOTE — PROGRESS NOTES
CNM PROGRESS NOTE    SUBJECTIVE:  Sitting at the bedside, breathing well through ctxs, feeling them in the back. Denies needing anything for pain control.     OBJECTIVE:  /80   Pulse 93   Temp 97  F (36.1  C) (Temporal)   Resp 20   Ht 1.524 m (5')   Wt 73 kg (161 lb)   LMP 2018   BMI 31.44 kg/m      Fetal heart tones: Baseline   Variability: Moderate  Accelerations: Present  Decelerations: Absent      Contractions: Pt is lucero every 2-3 minutes, lasting 50-60 seconds and palpates moderate    Cervix: 7.5-8/ 100% / +1, Vtx  ROM: not ruptured    Pitocin- none  Antibiotics- none  Cervical ripening: N/A    ASSESSMENT:  IUP @ 38w5d active labor and good progress   GBS- negative  Coping well through ctxs     PLAN:   Comfort measures prn   Pain medication: Planning unmedicated  Discussed AROM vs expectant mgmt. Pa declines wanting AROM at this time.   Anticipate   Reevaluate PRN    PETER Bronson CNM

## 2019-08-04 NOTE — PLAN OF CARE
Pt transferred to room 415 with  and 2 yo child. Pt oriented to unit, room and call light system. VSS. External monitors applied with pt's verbal consent.  Cxt 2-3 min, FHT category 1. Orders for intermittent auscultation received. POC reviewed with pt and , verbalize understanding. Mabel Latif in the department. Will continue to monitor per POC.

## 2019-08-05 VITALS
BODY MASS INDEX: 31.61 KG/M2 | HEART RATE: 66 BPM | TEMPERATURE: 97.8 F | SYSTOLIC BLOOD PRESSURE: 111 MMHG | RESPIRATION RATE: 16 BRPM | HEIGHT: 60 IN | DIASTOLIC BLOOD PRESSURE: 63 MMHG | WEIGHT: 161 LBS

## 2019-08-05 PROBLEM — Z23 NEED FOR MMR VACCINE: Chronic | Status: ACTIVE | Noted: 2019-07-18

## 2019-08-05 LAB — HGB BLD-MCNC: 12.4 G/DL (ref 11.7–15.7)

## 2019-08-05 PROCEDURE — 36415 COLL VENOUS BLD VENIPUNCTURE: CPT | Performed by: ADVANCED PRACTICE MIDWIFE

## 2019-08-05 PROCEDURE — 85018 HEMOGLOBIN: CPT | Performed by: ADVANCED PRACTICE MIDWIFE

## 2019-08-05 PROCEDURE — 25000132 ZZH RX MED GY IP 250 OP 250 PS 637: Performed by: ADVANCED PRACTICE MIDWIFE

## 2019-08-05 RX ORDER — LANOLIN 100 %
1 OINTMENT (GRAM) TOPICAL
COMMUNITY
Start: 2019-08-05 | End: 2019-12-17

## 2019-08-05 RX ORDER — AMOXICILLIN 250 MG
1 CAPSULE ORAL 2 TIMES DAILY PRN
COMMUNITY
Start: 2019-08-05 | End: 2019-12-17

## 2019-08-05 RX ORDER — ACETAMINOPHEN 325 MG/1
650 TABLET ORAL EVERY 4 HOURS PRN
COMMUNITY
Start: 2019-08-05 | End: 2020-11-18

## 2019-08-05 RX ORDER — IBUPROFEN 200 MG
600 TABLET ORAL EVERY 6 HOURS PRN
COMMUNITY
Start: 2019-08-05 | End: 2019-12-17

## 2019-08-05 RX ADMIN — SENNOSIDES AND DOCUSATE SODIUM 1 TABLET: 8.6; 5 TABLET ORAL at 10:00

## 2019-08-05 RX ADMIN — ACETAMINOPHEN 650 MG: 325 TABLET, FILM COATED ORAL at 01:59

## 2019-08-05 NOTE — PLAN OF CARE
Vital signs stable. Fundus firm, U/2, bleeding scant, no clots. Up in room independently, encouraged frequent voiding. Taking Tylenol for pain and using hot packs for uterine cramping. Breastfeeding . Requested to supplement with formula via bottle. Reinforced education on safe sleep. Mother verbalized understanding. Will continue to monitor and notify MD as needed.

## 2019-08-05 NOTE — LACTATION NOTE
Routine visit with Pa, FOB and baby.  Breastfeeding well and supplementing with Similac via bottle.  Getting ready for discharge.  Plan: Watch for feeding cues and feed every 2-3 hours and/or on demand. Continue to use feeding log to track intake and appropriate voids and stools. Take feeding log to first follow up appointment or weight check. Encourage skin to skin to promote frequent feedings, thermoregulation and bonding. Follow-up with healthcare provider or lactation consultant for questions or concerns.    Will follow up with Bernice ENRIQUEZ. Pa  Smiling and thanked LC for checking in.   No further questions at this time.  Radha De Guzman BSN, RN, PHN, RNC-MNN, IBCLC

## 2019-08-05 NOTE — PLAN OF CARE
Vital signs are stable.   Fundus is firm, scant flow.  Pt denies the need for pain medication. Up independently in the room. Breastfeeding and bottle feeding infant on demand. Will continue to monitor

## 2019-08-05 NOTE — DISCHARGE INSTRUCTIONS
Discharge Instructions  You may not be sure when your baby is sick and needs to see a doctor, especially if this is your first baby.  DO call your clinic if you are worried about your baby s health.  Most clinics have a 24-hour nurse help line. They are able to answer your questions or reach your doctor 24 hours a day. It is best to call your doctor or clinic instead of the hospital. We are here to help you.    Call 911 if your baby:  - Is limp and floppy  - Has  stiff arms or legs or repeated jerking movements  - Arches his or her back repeatedly  - Has a high-pitched cry  - Has bluish skin  or looks very pale    Call your baby s doctor or go to the emergency room right away if your baby:  - Has a high fever: Rectal temperature of 100.4 degrees F (38 degrees C) or higher or underarm temperature of 99 degree F (37.2 C) or higher.  - Has skin that looks yellow, and the baby seems very sleepy.  - Has an infection (redness, swelling, pain) around the umbilical cord or circumcised penis OR bleeding that does not stop after a few minutes.    Call your baby s clinic if you notice:  - A low rectal temperature of (97.5 degrees F or 36.4 degree C).  - Changes in behavior.  For example, a normally quiet baby is very fussy and irritable all day, or an active baby is very sleepy and limp.  - Vomiting. This is not spitting up after feedings, which is normal, but actually throwing up the contents of the stomach.  - Diarrhea (watery stools) or constipation (hard, dry stools that are difficult to pass).  stools are usually quite soft but should not be watery.  - Blood or mucus in the stools.  - Coughing or breathing changes (fast breathing, forceful breathing, or noisy breathing after you clear mucus from the nose).  - Feeding problems with a lot of spitting up.  - Your baby does not want to feed for more than 6 to 8 hours or has fewer diapers than expected in a 24 hour period.  Refer to the feeding log for expected  number of wet diapers in the first days of life.    If you have any concerns about hurting yourself of the baby, call your doctor right away.      Baby's Birth Weight:    Baby's Discharge Weight: 73 kg (161 lb)    Recent Labs   Lab Test 19  0020 19  1550   ABO O  --    RH Pos  --    BILITOTAL  --  0.2       Immunization History   Administered Date(s) Administered     DTAP (<7y) 1994, 1995, 1995, 1995, 1998     HepB, Unspecified 1994, 1994, 1994, 1995     Hib (PRP-T) 1994, 1995, 1995, 1995     Influenza (IIV3) PF 2012     Influenza Vaccine IM 3yrs+ 4 Valent IIV4 2018     MMR 2003, 2017     Meningococcal (Menomune ) 2007     Polio, Unspecified  1994, 1995, 1998, 1998     TDAP Vaccine (Adacel) 08/15/2006, 2012, 2017, 07/10/2019     Tdap (Adacel,Boostrix) 08/15/2006     Varicella 2003, 2007, 2007       Hearing Screen Date:           Umbilical Cord:      Pulse Oximetry Screen Result:    (right arm):    (foot):      Car Seat Testing Results:      Date and Time of  Metabolic Screen:         ID Band Number ________  I have checked to make sure that this is my baby.

## 2019-08-05 NOTE — DISCHARGE SUMMARY
[facility]    Discharge Summary  Obstetrics    Date of Admission:  8/3/2019  Date of Discharge:  2019  Discharging Provider: Taina Carlson  Date of Service (when I saw the patient): 19    Discharge Diagnoses     Lactating mother      History of Present Illness   Kd Garcia is a 24 year old female who presented with spontaneous onset of labor.  Her labor progressed and she had spontaneous vaginal birth for viable female infant.      Hospital Course   The patient's hospital course was unremarkable.  She recovered as anticipated and experienced no post-delivery complications. On discharge, her pain was well controlled. Vaginal bleeding is stable.  Voiding without difficulty.  Ambulating well and tolerating a normal diet.  No fever.  Breastfeeding well.  Infant is stable.  She was discharged on post-partum day #1.    Post-partum hemoglobin:   Hemoglobin   Date Value Ref Range Status   2019 12.4 11.7 - 15.7 g/dL Final       Taina Carlson    Discharge Disposition   Discharged to home   Condition at discharge: Good    Primary Care Physician   Physician No Ref-Primary    Consultations This Hospital Stay   ANESTHESIOLOGY IP CONSULT  LACTATION IP CONSULT    Discharge Orders      Activity    Review discharge instructions     Reason for your hospital stay    Maternity care     Follow Up and recommended labs and tests    Follow up with any midwife within 6 weeks. for hospital follow- up.  No follow up labs or test are needed.     Discharge Instructions - Postpartum visit    Schedule postpartum visit with your provider and return to clinic in 6 weeks.     Diet    Resume previous diet     Discharge Medications   Current Discharge Medication List      START taking these medications    Details   acetaminophen (TYLENOL) 325 MG tablet Take 2 tablets (650 mg) by mouth every 4 hours as needed for mild pain or fever (greater than or equal to 38  C /100.4  F (oral) or 38.5  C/ 101.4  F  (core).)    Associated Diagnoses:  (spontaneous vaginal delivery); Lactating mother      ibuprofen (ADVIL/MOTRIN) 200 MG tablet Take 3 tablets (600 mg) by mouth every 6 hours as needed for moderate pain (mild-moderate pain)    Associated Diagnoses:  (spontaneous vaginal delivery); Lactating mother      lanolin ointment Apply 1 g topically every hour as needed for dry skin (sore nipples)    Associated Diagnoses:  (spontaneous vaginal delivery); Lactating mother      senna-docusate (SENOKOT-S/PERICOLACE) 8.6-50 MG tablet Take 1 tablet by mouth 2 times daily as needed for constipation    Associated Diagnoses:  (spontaneous vaginal delivery); Lactating mother         CONTINUE these medications which have NOT CHANGED    Details   order for DME Equipment being ordered: double electric breast pump and supplies to be used daily PRN for 1 year  Qty: 1 Device, Refills: 0    Associated Diagnoses: Lactating mother           Allergies   No Known Allergies

## 2019-08-05 NOTE — PROGRESS NOTES
James Gleason CNM Progress Note: Postpartum Day #1    2019  10:27 AM    SUBJECTIVE:  Patient is stable and is tolerating acitivity well  Baby is rooming in  Complications since 2 hours post delivery: None  Pain is well controlled.  Patient is taking pain medications.  Breastfeeding status:initiated and well established   Elimination:  She is voiding without difficulty.  She has not had a bowel movement  Desired contraception Unsure  Denies heavy bleeding and passing large clots.  Feels good about birth experience.    OBJECTIVE:  /63 (BP Location: Right arm)   Pulse 66   Temp 97.8  F (36.6  C) (Oral)   Resp 16   Ht 1.524 m (5')   Wt 73 kg (161 lb)   LMP 2018   Breastfeeding? Unknown   BMI 31.44 kg/m      Constitutional: healthy, alert and no distress    Breasts: Currently breastfeeding    Fundus: Uterine fundus is firm, non-tender and at 1FB below the level of the umbilicus per flowsheet    Perineum: Perineum is intact and/or well approximated, minimal swelling    Lochia: Lochia is appropriate for the duration of time since delivery.     ASSESSMENT:  PPD #1    Doing well.  No excessive bleeding  Pain well-controlled.  Hemoglobin   Date Value Ref Range Status   2019 12.4 11.7 - 15.7 g/dL Final   ]      PLAN:  d/c home today.  f/u in the office in 6 weeks  Reviewed breastfeeding  Reviewed postpartum warning signs  Reviewed postpartum blues and postpartum depression warning signs  Plans unsure for contraception postpartum  Anticipated discharge 19        PETER Mackey CNM

## 2019-08-05 NOTE — PLAN OF CARE
VSS Fundus firm, scant flow. Pt denies the need for pain medication. Breastfeeding on demand,latching well. Discharging to home with infant later today.    Discharged at 1141

## 2019-08-12 ENCOUNTER — MYC MEDICAL ADVICE (OUTPATIENT)
Dept: MIDWIFE SERVICES | Facility: CLINIC | Age: 24
End: 2019-08-12

## 2019-08-12 NOTE — TELEPHONE ENCOUNTER
Please see my chart message. Pt is nursing. Is monistat OK to use? Listed as L2. Routing to CNM to review.

## 2019-08-23 ENCOUNTER — TELEPHONE (OUTPATIENT)
Dept: MIDWIFE SERVICES | Facility: CLINIC | Age: 24
End: 2019-08-23

## 2019-08-23 NOTE — TELEPHONE ENCOUNTER
Called patient at 2 weeks postpartum to answer any questions and to see how she is doing, no answer, LM. Encouraged to call clinic with questions or concerns.   Encouraged to make/keep 6 week postpartum visit.    Mabel GREEN CNM

## 2019-09-30 ENCOUNTER — PRENATAL OFFICE VISIT (OUTPATIENT)
Dept: MIDWIFE SERVICES | Facility: CLINIC | Age: 24
End: 2019-09-30
Payer: COMMERCIAL

## 2019-09-30 ENCOUNTER — TELEPHONE (OUTPATIENT)
Dept: MIDWIFE SERVICES | Facility: CLINIC | Age: 24
End: 2019-09-30

## 2019-09-30 VITALS
SYSTOLIC BLOOD PRESSURE: 102 MMHG | HEIGHT: 60 IN | WEIGHT: 146 LBS | BODY MASS INDEX: 28.66 KG/M2 | HEART RATE: 62 BPM | DIASTOLIC BLOOD PRESSURE: 58 MMHG

## 2019-09-30 DIAGNOSIS — Z30.09 COUNSELING FOR INITIATION OF BIRTH CONTROL METHOD: ICD-10-CM

## 2019-09-30 DIAGNOSIS — Z30.011 BCP (BIRTH CONTROL PILLS) INITIATION: ICD-10-CM

## 2019-09-30 PROBLEM — Z34.80 SUPERVISION OF NORMAL INTRAUTERINE PREGNANCY IN MULTIGRAVIDA: Status: RESOLVED | Noted: 2019-01-15 | Resolved: 2019-09-30

## 2019-09-30 PROCEDURE — 99207 ZZC POST PARTUM EXAM: CPT | Performed by: ADVANCED PRACTICE MIDWIFE

## 2019-09-30 RX ORDER — ACETAMINOPHEN AND CODEINE PHOSPHATE 120; 12 MG/5ML; MG/5ML
0.35 SOLUTION ORAL DAILY
Qty: 112 TABLET | Refills: 3 | Status: SHIPPED | OUTPATIENT
Start: 2019-09-30 | End: 2019-09-30

## 2019-09-30 RX ORDER — ACETAMINOPHEN AND CODEINE PHOSPHATE 120; 12 MG/5ML; MG/5ML
0.35 SOLUTION ORAL DAILY
Qty: 112 TABLET | Refills: 3 | Status: SHIPPED | OUTPATIENT
Start: 2019-09-30 | End: 2020-11-16

## 2019-09-30 SDOH — HEALTH STABILITY: MENTAL HEALTH: HOW MANY DRINKS CONTAINING ALCOHOL DO YOU HAVE ON A TYPICAL DAY WHEN YOU ARE DRINKING?: 3 OR 4

## 2019-09-30 SDOH — HEALTH STABILITY: MENTAL HEALTH: HOW OFTEN DO YOU HAVE SIX OR MORE DRINKS ON ONE OCCASION?: LESS THAN MONTHLY

## 2019-09-30 SDOH — HEALTH STABILITY: MENTAL HEALTH: HOW OFTEN DO YOU HAVE A DRINK CONTAINING ALCOHOL?: MONTHLY OR LESS

## 2019-09-30 ASSESSMENT — ANXIETY QUESTIONNAIRES
2. NOT BEING ABLE TO STOP OR CONTROL WORRYING: NOT AT ALL
5. BEING SO RESTLESS THAT IT IS HARD TO SIT STILL: NOT AT ALL
1. FEELING NERVOUS, ANXIOUS, OR ON EDGE: NOT AT ALL
GAD7 TOTAL SCORE: 4
IF YOU CHECKED OFF ANY PROBLEMS ON THIS QUESTIONNAIRE, HOW DIFFICULT HAVE THESE PROBLEMS MADE IT FOR YOU TO DO YOUR WORK, TAKE CARE OF THINGS AT HOME, OR GET ALONG WITH OTHER PEOPLE: SOMEWHAT DIFFICULT
3. WORRYING TOO MUCH ABOUT DIFFERENT THINGS: SEVERAL DAYS
6. BECOMING EASILY ANNOYED OR IRRITABLE: MORE THAN HALF THE DAYS
7. FEELING AFRAID AS IF SOMETHING AWFUL MIGHT HAPPEN: NOT AT ALL

## 2019-09-30 ASSESSMENT — PATIENT HEALTH QUESTIONNAIRE - PHQ9
SUM OF ALL RESPONSES TO PHQ QUESTIONS 1-9: 4
5. POOR APPETITE OR OVEREATING: SEVERAL DAYS

## 2019-09-30 ASSESSMENT — MIFFLIN-ST. JEOR: SCORE: 1328.75

## 2019-09-30 NOTE — PROGRESS NOTES
Midwife Postpartum 6 Week Visit    Pa Kev Garcia is a 24 year old here for a postpartum checkup.     Delivery date was 19. She had a  of a viable girl, named Flor weight 6 pounds 6.3 oz., with none complications      Since delivery, she has been breast feeding.  She has not had any signs of infection, her lochia stopped after 4 weeks.  She has not had other complications.     She is voiding and having bowel movements without difficulty.  NO     Contraception was discussed and patient desires oral contraceptives.   She  has had intercourse since delivery.   She complains of no perineal discomfort.     Mood is Stable  Patient screened for postpartum depression.   Depression Rating was:   Last PHQ-9 score on record =   PHQ-9 SCORE 1/15/2019   PHQ-9 Total Score -   PHQ-9 Total Score 8     Last GAD7 score on record = No flowsheet data found.  Alcohol Score = 1    ROS:  12 point review of systems negative other than symptoms noted below.       Current Outpatient Medications:      acetaminophen (TYLENOL) 325 MG tablet, Take 2 tablets (650 mg) by mouth every 4 hours as needed for mild pain or fever (greater than or equal to 38  C /100.4  F (oral) or 38.5  C/ 101.4  F (core).), Disp: , Rfl:      ibuprofen (ADVIL/MOTRIN) 200 MG tablet, Take 3 tablets (600 mg) by mouth every 6 hours as needed for moderate pain (mild-moderate pain), Disp: , Rfl:      lanolin ointment, Apply 1 g topically every hour as needed for dry skin (sore nipples), Disp: , Rfl:      order for DME, Equipment being ordered: double electric breast pump and supplies to be used daily PRN for 1 year (Patient not taking: Reported on 2019), Disp: 1 Device, Rfl: 0     senna-docusate (SENOKOT-S/PERICOLACE) 8.6-50 MG tablet, Take 1 tablet by mouth 2 times daily as needed for constipation, Disp: , Rfl: .   OB History    Para Term  AB Living   6 3 3 0 3 3   SAB TAB Ectopic Multiple Live Births   2 1 0 0 3      # Outcome Date GA Lbr Josh/2nd  Weight Sex Delivery Anes PTL Lv   6 Term 19 38w5d 06:10 / 00:11 2.9 kg (6 lb 6.3 oz) F Vag-Spont None N MARGARET      Name: HOWIEFEMALE-PA      Apgar1: 8  Apgar5: 9   5 Term 17 40w0d 04:18 / 00:05 3.175 kg (7 lb) F Vag-Spont None N MARGARET      Name: HOWIEBABY1 PA      Apgar1: 8  Apgar5: 9   4 2016     SAB      3 SAB 2016     SAB      2 TAB 2015     TAB      1 Term 12 38w4d 05:42 / 00:09 2.92 kg (6 lb 7 oz) M Vag-Spont Local N MARGARET      Name: JUSTIN DENISE PA      Apgar1: 9  Apgar5: 9     Last pap:    Lab Results   Component Value Date    PAP ASC-US 2016     Hgb in hospital was 12.4    EXAM:  LMP 2018   BMI: There is no height or weight on file to calculate BMI.  Constitutional: healthy, alert and no distress  Neck: symmetrical, thyroid normal size, no masses present, no lymphadenopathy present.   Breast deferred, patient lactating.  Abdomen: soft, non-tender, diastasis 1 FB's    PELVIC EXAM:  deferred      ASSESSMENT:   Normal postpartum exam after .  No diagnosis found.      PLAN:  Results for orders placed or performed during the hospital encounter of 19   Treponema Abs w Reflex to RPR and Titer   Result Value Ref Range    Treponema Antibodies Nonreactive NR^Nonreactive   CBC with platelets differential   Result Value Ref Range    WBC 15.5 (H) 4.0 - 11.0 10e9/L    RBC Count 4.36 3.8 - 5.2 10e12/L    Hemoglobin 12.9 11.7 - 15.7 g/dL    Hematocrit 38.9 35.0 - 47.0 %    MCV 89 78 - 100 fl    MCH 29.6 26.5 - 33.0 pg    MCHC 33.2 31.5 - 36.5 g/dL    RDW 13.0 10.0 - 15.0 %    Platelet Count 260 150 - 450 10e9/L    Diff Method Automated Method     % Neutrophils 71.4 %    % Lymphocytes 16.3 %    % Monocytes 9.0 %    % Eosinophils 1.2 %    % Basophils 0.2 %    % Immature Granulocytes 1.9 %    Nucleated RBCs 0 0 /100    Absolute Neutrophil 11.1 (H) 1.6 - 8.3 10e9/L    Absolute Lymphocytes 2.5 0.8 - 5.3 10e9/L    Absolute Monocytes 1.4 (H) 0.0 - 1.3 10e9/L    Absolute Eosinophils 0.2 0.0 - 0.7  10e9/L    Absolute Basophils 0.0 0.0 - 0.2 10e9/L    Abs Immature Granulocytes 0.3 0 - 0.4 10e9/L    Absolute Nucleated RBC 0.0    Hemoglobin   Result Value Ref Range    Hemoglobin 12.4 11.7 - 15.7 g/dL   ABO/Rh type and screen   Result Value Ref Range    ABO O     RH(D) Pos     Antibody Screen Neg     Test Valid Only At [facility]        Specimen Expires 08/07/2019        Return as needed or at time of next expected pap, pelvic, or breast exam.  Teaching: exercise, birth control and mental health  Family Planning:mini pill / progesterone only pill - she will try the mini pill. We discussed the Nexplanon and IUD. She will make an appointment if she desires it.   Encourage Kegels and abdominal exercise.  Continue a multivitamin/prenatal supplement, especially if breastfeeding.  Pap smear was not obtained today.  Postpartum Hgb was not done today.    GDM:  Fasting and 2hr GCT needed:  No  If yes, remind need for annual fasting BG and nutrition/exercise recommendations.    Return to clinic:  1 year or sooner if needed.    Elisabeth Lozano, DNP, APRN, CNM

## 2019-09-30 NOTE — PATIENT INSTRUCTIONS
Progesterone Only Oral Contraceptive Pills (POPs/Minipill)      Minipills contain only progesterone. Combined pills contain both estrogen and progesterone. Minipills are a great option for women who are breastfeeding or for women who cannot take estrogen. POPs do not increase your risk for blood clots like combined OCP's do. Women who are breastfeeding should call to change contraceptive type when they are done breastfeeding.     How it works:    The minipill effectively prevents pregnancy by actions of the progesterone hormone on various parts of the reproductive system. It makes cervical mucus too thick for sperm to move easily through, it makes the lining of the uterus too thin for a fertilized egg to grow, and it sometimes prevents ovulation all together. The minipill is slightly less effective than combined pills, the pregnancy rate is about 3%.    How to take the minipill:      Minipills come in packs containing several weeks of pills, each pill is marked in the packs so that one pill is taken every day of the week      Unlike combined OCPs there are no placebo pills      Start the first pack of pills on the first day of your menstrual period, if you are postpartum you can start the pills right away      It is extremely important to take the pill AT THE SAME TIME EVERY DAY (at least within the same hour)      As soon as you finish one pack, start another      If you experience illness such as nausea and vomiting or diarrhea use a backup method for 7 days       Missed/forgotten pills:      If you miss one pill take it as soon as you remember, take your next pill the next day at your usual time and used a backup method like a condom for 7 days if it was more than 3 hours late              If you miss two pills do not take the missed pills, just continue taking your minipill as you normally would, but make sure to use a backup method for the duration of that package, until you start a new one      If you miss  more than two pills please contact the clinic      Menstrual changes:      Women taking the minipill often experience short/irregular cycles or may not have a period at all      You may also experience some spotting or bleeding in between your cycles      Contact the clinic if:      You miss one or two pills and then do not get a period      If you have been experiencing normal periods and unexpectedly miss one      If you have any symptoms of pregnancy such as breast tenderness, increased tiredness, or cramping in your lower abdomen      If you have heavy or prolonged bleeding, abdominal pain, fever, or cramps    You can stop taking the minipill if you wish to get pregnant.     Please call with any questions or concerns:    [facility]     [phone]

## 2019-09-30 NOTE — TELEPHONE ENCOUNTER
"Micronor Rx sent to [facility] today by ARJUN Lozano CNM    norethindrone (MICRONOR) 0.35 MG tablet 112 tablet 3 9/30/2019  --   Sig - Route: Take 1 tablet (0.35 mg) by mouth daily - Oral     Want instructions to reflect \"continuous\" if indeed this is to be taken in continuous fashion.    Rx sent to reflect per [facility] request.    Grecia Dixon RN on 9/30/2019 at 4:02 PM    "

## 2019-10-01 ASSESSMENT — ANXIETY QUESTIONNAIRES: GAD7 TOTAL SCORE: 4

## 2019-10-03 ENCOUNTER — HEALTH MAINTENANCE LETTER (OUTPATIENT)
Age: 24
End: 2019-10-03

## 2019-12-17 ENCOUNTER — OFFICE VISIT (OUTPATIENT)
Dept: OBGYN | Facility: CLINIC | Age: 24
End: 2019-12-17
Payer: COMMERCIAL

## 2019-12-17 VITALS
SYSTOLIC BLOOD PRESSURE: 106 MMHG | WEIGHT: 143 LBS | DIASTOLIC BLOOD PRESSURE: 64 MMHG | HEIGHT: 60 IN | BODY MASS INDEX: 28.07 KG/M2 | HEART RATE: 68 BPM

## 2019-12-17 DIAGNOSIS — Z30.011 OCP (ORAL CONTRACEPTIVE PILLS) INITIATION: ICD-10-CM

## 2019-12-17 DIAGNOSIS — N93.9 VAGINAL SPOTTING: Primary | ICD-10-CM

## 2019-12-17 LAB — HCG UR QL: NEGATIVE

## 2019-12-17 PROCEDURE — 99213 OFFICE O/P EST LOW 20 MIN: CPT | Performed by: OBSTETRICS & GYNECOLOGY

## 2019-12-17 PROCEDURE — 81025 URINE PREGNANCY TEST: CPT | Performed by: OBSTETRICS & GYNECOLOGY

## 2019-12-17 RX ORDER — ACETAMINOPHEN AND CODEINE PHOSPHATE 120; 12 MG/5ML; MG/5ML
0.35 SOLUTION ORAL DAILY
Qty: 90 TABLET | Refills: 3 | Status: SHIPPED | OUTPATIENT
Start: 2019-12-17 | End: 2020-11-16

## 2019-12-17 ASSESSMENT — MIFFLIN-ST. JEOR: SCORE: 1315.14

## 2019-12-17 NOTE — PROGRESS NOTES
SUBJECTIVE:                                                   Kd Garcia is a 24 year old female who presents to clinic today for the following health issue(s):  Patient presents with:  Vaginal Bleeding: has been having spotting for 1 week. UPT last week was negative. Currently breastfeeding.  Delivered 19      HPI:  Here today for irregular bleeding.  S/p uncomplicated  with midwife service on 2019.   Resumed menses in the 2nd week of September x 5d.  Then had faintly positive UPT on Oct 21st.  Started bleeding 2 weeks later and had one week of slightly heavier bright red bleeding -.  Had 2 days of light bleeding at end of November and now having daily spotting for the last week.  Took home UPT 1.5wks ago.  Can't remember why she took the test.  +SA --using condoms but inconsistently.  No other pregnancy symptoms.    +breastfeeding.   Was going to start minipill but never filled prescription    No pain or cramping.  No n/v.  No breast tenderness    Patient's last menstrual period was 2019..     Patient is sexually active, .  Using none for contraception.    reports that she has quit smoking. She smoked 0.00 packs per day. She has never used smokeless tobacco.    STD testing offered?  Declined    Health maintenance updated:  yes    Today's PHQ-2 Score:   PHQ-2 (  [employer]) 2019   Q1: Little interest or pleasure in doing things 1   Q2: Feeling down, depressed or hopeless 1   PHQ-2 Score 2     Today's PHQ-9 Score:   PHQ-9 SCORE 2019   PHQ-9 Total Score -   PHQ-9 Total Score 4     Today's RACHELLE-7 Score:   RACHELLE-7 SCORE 2019   Total Score 4       Problem list and histories reviewed & adjusted, as indicated.  Additional history: as documented.    Patient Active Problem List   Diagnosis     Papanicolaou smear of cervix with atypical squamous cells of undetermined significance (ASC-US)     Possible recent exposure to tuberculosis     Lactating mother     Past Surgical  History:   Procedure Laterality Date     HC OPEN RX DISTAL RADIUS FX, EXTRA-ARTICULAR  2008    left arm fx      Social History     Tobacco Use     Smoking status: Former Smoker     Packs/day: 0.00     Smokeless tobacco: Never Used   Substance Use Topics     Alcohol use: Yes     Frequency: Monthly or less     Drinks per session: 3 or 4     Binge frequency: Less than monthly      Problem (# of Occurrences) Relation (Name,Age of Onset)    C.A.D. (1) Father (70): MI    Cerebrovascular Disease (1) Father    Coronary Artery Disease (1) Father    Hypertension (1) Father    Unknown/Adopted (1) Mother       Negative family history of: Diabetes, Hyperlipidemia, Breast Cancer, Colon Cancer, Prostate Cancer, Other Cancer, Depression, Anxiety Disorder, Mental Illness, Substance Abuse, Anesthesia Reaction, Asthma, Osteoporosis, Genetic Disorder, Thyroid Disease, Obesity            Current Outpatient Medications   Medication Sig     acetaminophen (TYLENOL) 325 MG tablet Take 2 tablets (650 mg) by mouth every 4 hours as needed for mild pain or fever (greater than or equal to 38  C /100.4  F (oral) or 38.5  C/ 101.4  F (core).)     norethindrone (MICRONOR) 0.35 MG tablet Take 1 tablet (0.35 mg) by mouth daily     norethindrone (MICRONOR) 0.35 MG tablet Take 1 tablet (0.35 mg) by mouth daily Continuously     No current facility-administered medications for this visit.      No Known Allergies    ROS:  12 point review of systems negative other than symptoms noted below or in the HPI.  Genitourinary: Spotting  No urinary frequency or dysuria, bladder or kidney problems      OBJECTIVE:     /64   Pulse 68   Ht 1.524 m (5')   Wt 64.9 kg (143 lb)   LMP 11/09/2019   BMI 27.93 kg/m    Body mass index is 27.93 kg/m .    Exam:  Constitutional:  Appearance: Well nourished, well developed alert, in no acute distress  Gastrointestinal:  Abdominal Examination:  Abdomen nontender to palpation, tone normal without rigidity or guarding, no  masses present, umbilicus without lesions; Liver/Spleen:  No hepatomegaly present, liver nontender to palpation; Hernias:  No hernias present  Lymphatic: Lymph Nodes:  No other lymphadenopathy present  Skin: General Inspection:  No rashes present, no lesions present, no areas of discoloration.  Neurologic:  Mental Status:  Oriented X3.  Normal strength and tone, sensory exam grossly normal, mentation intact and speech normal.    Psychiatric:  Mentation appears normal and affect normal/bright.  Pelvic Exam:  External Genitalia:     Normal appearance for age, no discharge present, no tenderness present, no inflammatory lesions present, color normal  Vagina:     Normal vaginal vault without central or paravaginal defects, no discharge present, no inflammatory lesions present, no masses present  Bladder:     Nontender to palpation  Urethra:   Urethral Body:  Urethra palpation normal, urethra structural support normal   Urethral Meatus:  No erythema or lesions present  Cervix:     Appearance healthy, no lesions present, nontender to palpation, no bleeding present  Uterus:     Uterus: firm, normal sized and nontender, anteverted in position.   Adnexa:     No adnexal tenderness present, no adnexal masses present  Perineum:     Perineum within normal limits, no evidence of trauma, no rashes or skin lesions present  Anus:     Anus within normal limits, no hemorrhoids present  Inguinal Lymph Nodes:     No lymphadenopathy present  Pubic Hair:     Normal pubic hair distribution for age  Genitalia and Groin:     No rashes present, no lesions present, no areas of discoloration, no masses present       In-Clinic Test Results:  Results for orders placed or performed in visit on 12/17/19 (from the past 24 hour(s))   HCG Qual, Urine ([id number])   Result Value Ref Range    HCG Qual Urine Negative NEG^Negative       ASSESSMENT/PLAN:                                                        ICD-10-CM    1. Vaginal spotting N93.9 HCG Qual,  Urine ([id number])   2. OCP (oral contraceptive pills) initiation Z30.011 norethindrone (MICRONOR) 0.35 MG tablet       Patient Instructions   Reassurance given today with irregular bleeding in the setting of recent postpartum status as well as possible early miscarriage.  UPT negative today.  Will start progesterone only pill today.  Recommended back up birth control x 1 week.        Mickie Cedeno MD  [facility]

## 2019-12-17 NOTE — PATIENT INSTRUCTIONS
Reassurance given today with irregular bleeding in the setting of recent postpartum status as well as possible early miscarriage.  UPT negative today.  Will start progesterone only pill today.  Recommended back up birth control x 1 week.

## 2020-06-17 ENCOUNTER — VIRTUAL VISIT (OUTPATIENT)
Dept: FAMILY MEDICINE | Facility: CLINIC | Age: 25
End: 2020-06-17

## 2020-06-17 DIAGNOSIS — Z11.1 SCREENING EXAMINATION FOR PULMONARY TUBERCULOSIS: Primary | ICD-10-CM

## 2020-06-17 PROCEDURE — 99213 OFFICE O/P EST LOW 20 MIN: CPT | Mod: 95 | Performed by: NURSE PRACTITIONER

## 2020-06-17 ASSESSMENT — PAIN SCALES - GENERAL: PAINLEVEL: NO PAIN (0)

## 2020-06-17 NOTE — PROGRESS NOTES
"Kd Garcia is a 24 year old female who is being evaluated via a billable video visit.      The patient has been notified of following:     \"This video visit will be conducted via a call between you and your physician/provider. We have found that certain health care needs can be provided without the need for an in-person physical exam.  This service lets us provide the care you need with a video conversation.  If a prescription is necessary we can send it directly to your pharmacy.  If lab work is needed we can place an order for that and you can then stop by our lab to have the test done at a later time.    Video visits are billed at different rates depending on your insurance coverage.  Please reach out to your insurance provider with any questions.    If during the course of the call the physician/provider feels a video visit is not appropriate, you will not be charged for this service.\"    Patient has given verbal consent for Video visit? Yes    Will anyone else be joining your video visit? No    Subjective     Kd Garcia is a 24 year old female who presents today via video visit for the following health issues:    HPI  Patient wants to get a TB blood test. 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 All other immunizations are UTD.    No unexplained hoarseness  No loss of appetite  No unexplained weight loss  No productive or prolonged cough  No bloody sputum  Np persistent fever over 100 F  No night sweats  No hemoptysis  No shortness of breath  No unexplained fatigue or weakness  No chest pain  No recurrent pneumonia  No unprotected exposure to a known TB patient    Unable to connect with video visit so changd to phone visit.    Patient Active Problem List   Diagnosis     Papanicolaou smear of cervix with atypical squamous cells of undetermined significance (ASC-US)     Possible recent exposure to tuberculosis     Lactating mother     Past Surgical History:   Procedure Laterality Date "     HC OPEN RX DISTAL RADIUS FX, EXTRA-ARTICULAR  2008    left arm fx       Social History     Tobacco Use     Smoking status: Former Smoker     Packs/day: 0.00     Smokeless tobacco: Never Used   Substance Use Topics     Alcohol use: Yes     Frequency: Monthly or less     Drinks per session: 3 or 4     Binge frequency: Less than monthly     Family History   Problem Relation Age of Onset     Unknown/Adopted Mother      C.A.D. Father 70        MI     Hypertension Father      Coronary Artery Disease Father      Cerebrovascular Disease Father      Diabetes No family hx of      Hyperlipidemia No family hx of      Breast Cancer No family hx of      Colon Cancer No family hx of      Prostate Cancer No family hx of      Other Cancer No family hx of      Depression No family hx of      Anxiety Disorder No family hx of      Mental Illness No family hx of      Substance Abuse No family hx of      Anesthesia Reaction No family hx of      Asthma No family hx of      Osteoporosis No family hx of      Genetic Disorder No family hx of      Thyroid Disease No family hx of      Obesity No family hx of          Current Outpatient Medications   Medication Sig Dispense Refill     acetaminophen (TYLENOL) 325 MG tablet Take 2 tablets (650 mg) by mouth every 4 hours as needed for mild pain or fever (greater than or equal to 38  C /100.4  F (oral) or 38.5  C/ 101.4  F (core).)       norethindrone (MICRONOR) 0.35 MG tablet Take 1 tablet (0.35 mg) by mouth daily 90 tablet 3     norethindrone (MICRONOR) 0.35 MG tablet Take 1 tablet (0.35 mg) by mouth daily Continuously 112 tablet 3     BP Readings from Last 3 Encounters:   12/17/19 106/64   09/30/19 102/58   08/05/19 111/63    Wt Readings from Last 3 Encounters:   12/17/19 64.9 kg (143 lb)   09/30/19 66.2 kg (146 lb)   08/04/19 73 kg (161 lb)                    Reviewed and updated as needed this visit by Provider         Review of Systems   Constitutional, HEENT, cardiovascular, pulmonary,  gi and gu systems are negative, except as otherwise noted.      Objective    There were no vitals taken for this visit.  Estimated body mass index is 27.93 kg/m  as calculated from the following:    Height as of 12/17/19: 1.524 m (5').    Weight as of 12/17/19: 64.9 kg (143 lb).  Physical Exam     GENERAL: Healthy, alert and no distress  RESP: able to speak in complete sentences, no wheezing or cough.  NEURO:  Mentation and speech appropriate for age.  PSYCH: Mentation appears normal, affect normal/bright, judgement and insight intact, normal speech     Diagnostic Test Results:  Labs reviewed in Epic  No results found for this or any previous visit (from the past 24 hour(s)).        Assessment & Plan     1. Screening examination for pulmonary tuberculosis    - Quantiferon TB Gold Plus     BMI:   Estimated body mass index is 27.93 kg/m  as calculated from the following:    Height as of 12/17/19: 1.524 m (5').    Weight as of 12/17/19: 64.9 kg (143 lb).   Weight management plan: Discussed healthy diet and exercise guidelines        Work on weight loss  Regular exercise  See Patient Instructions    No follow-ups on file.    PETER Flores CNP  [facility]          Type of service: Phone Visit  Phone visit duration 5 minutes.        No follow-ups on file.       PETER Flores CNP

## 2020-06-17 NOTE — Clinical Note
It was a new note the correct note because we moved it to telephone so you could switch it all in the right note. But I just erased it all together so you should be able to sign now

## 2020-06-17 NOTE — PROGRESS NOTES
"Kd Garcia is a 24 year old female who is being evaluated via a billable telephone visit.      The patient has been notified of following:     \"This telephone visit will be conducted via a call between you and your physician/provider. We have found that certain health care needs can be provided without the need for a physical exam.  This service lets us provide the care you need with a short phone conversation.  If a prescription is necessary we can send it directly to your pharmacy.  If lab work is needed we can place an order for that and you can then stop by our lab to have the test done at a later time.    Telephone visits are billed at different rates depending on your insurance coverage. During this emergency period, for some insurers they may be billed the same as an in-person visit.  Please reach out to your insurance provider with any questions.    If during the course of the call the physician/provider feels a telephone visit is not appropriate, you will not be charged for this service.\"    Patient has given verbal consent for Telephone visit?  Yes    What phone number would you like to be contacted at? [phone]    How would you like to obtain your AVS? Madelinehart    Subjective     Kd Garcia is a 24 year old female who presents via phone visit today for the following health issues:    HPI  Patient wants to get a TB blood test. 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 All other immunizations are UTD.    {PEDS Chronic and Acute Problems (Optional):462512}     {additonal problems for provider to add (Optional):239787}    {HIST REVIEW/ LINKS 2 (Optional):552640}    Reviewed and updated as needed this visit by Provider         Review of Systems   {ROS COMP (Optional):204204}       Objective   Reported vitals:  Breastfeeding Yes    {GENERAL APPEARANCE:50::\"healthy\",\"alert\",\"no distress\"}  PSYCH: Alert and oriented times 3; coherent speech, normal   rate and volume, able to articulate " "logical thoughts, able   to abstract reason, no tangential thoughts, no hallucinations   or delusions  Her affect is { :3357339::\"normal\"}  RESP: No cough, no audible wheezing, able to talk in full sentences  Remainder of exam unable to be completed due to telephone visits    {Diagnostic Test Results (Optional):528101::\"Diagnostic Test Results:\",\"Labs reviewed in Epic\"}        Assessment/Plan:  {Diagnosis, Associated Orders and Comment:901499}    No follow-ups on file.      Phone call duration:  *** minutes    {signature options:978860}        "

## 2020-06-17 NOTE — PATIENT INSTRUCTIONS
At [facility], we strive to deliver an exceptional experience to you, every time we see you. If you receive a survey, please complete it as we do value your feedback.  If you have MyChart, you can expect to receive results automatically within 24 hours of their completion.  Your provider will send a note interpreting your results as well.   If you do not have MyChart, you should receive your results in about a week by mail.    Your care team:                            Family Medicine Internal Medicine   MD Anjel Marcum MD Shantel Branch-Fleming, MD Katya Georgiev PA-C Megan Hill, APRN CNP    Yoseph Carballo, MD Pediatrics   Davis Blancas, PASOREN Moreno, MD Isa Beckham APRN CNP   MD Ashlee Rivero MD Deborah Mielke, MD Kim Thein, APRN [facility]      Clinic hours: Monday - Thursday 7 am-7 pm; Fridays 7 am-5 pm.   Urgent care: Monday - Friday 11 am-9 pm; Saturday and Sunday 9 am-5 pm.    Clinic: [phone]       [facility]: Monday - Thursday 8 am - 7 pm; Friday 8 am - 6 pm  [facility]: [phone]     Use [email or web address] for 24/7 diagnosis and treatment of dozens of conditions.    Patient Education     TB Screening (Whole Blood)   Does this test have other names?  Interferon-gamma release assay (IGRA), Quantiferon test, T-spot  What is this test?  This test shows if you have been infected with tuberculosis (TB). TB is a very contagious bacterial infection that is spread through the air. It's possible to have inactive (latent) TB and not feel sick or have noticeable symptoms. Or you can have active TB disease with symptoms. People with latent TB are not contagious.  This test is more accurate and more specific than skin tests for TB. Results are ready in 24 hours. Also, you can have this screening test if you have been vaccinated against TB. The TB vaccine is called Bacille  Calmette-Мария (BCG). The skin test is not advised if you've been vaccinated. The skin test can lead to a false positive test result in people who have had the TB vaccine. The vaccine will not lead to a false positive IGRA test result.  There are 2 whole blood TB tests known as interferon gamma release assays (IGRAs) have been approved by the FDA and are available in the [place] for TB screening. They are:    QuantiFERON  TB Gold In-Tube test (QFT-GIT)    T-SPOT  TB test (T-Spot)  Why do I need this test?  You may need this test if you have recently been exposed to someone who has TB, or if your healthcare provider thinks you may have a TB infection.  Symptoms of TB may include:    Cough    Fever    Night sweats    Unexplained weight loss     Coughing up blood    Chest pain    Fatigue    Shortness of breath  TB usually affects the lungs. But it can spread to other parts of your body, such as your joints, spine, brain, and kidneys, and cause more symptoms.  You also may have this test if you:    Have HIV or another disease that weakens your immune system    Use illegal drugs    Live or work in a place with a higher rate of TB infection, such as a senior care or nursing home    Need to start a medicine that suppresses your immune system    Recently emigrated from areas where TB is more common, such as some Eastern  or [place] countries    Are a healthcare worker, and need this test as part of your facility's infection control program   What other tests might I have along with this test?  If you test positive for TB, you will likely also need a chest X-ray, sputum smear, and TB culture to find out if you have active or latent TB.   You may also be tested for HIV after a positive TB test.  What do my test results mean?  Test results may vary depending on your age, gender, health history, the method used for the test, and other things. Your test results may not mean you have a problem. Ask your healthcare provider  what your test results mean for you.  A positive IGRA test means you may have 1 of 2 types of TB:    Inactive (latent) TB. This means TB bacteria are present in your body, but are not active and are not causing symptoms. You are not contagious, although it's possible for you to develop TB in the future.    Active TB or TB disease. This means TB bacteria are active in your body, and you are contagious.  A negative test means that a TB infection (either active or inactive) is unlikely. But you may get negative results if you have very advanced TB. This is because in later stages the disease can suppress the immune reaction, causing the IGRA test to be positive.  How is this test done?  The test is done with a blood sample. A needle is used to draw blood from a vein in your arm or hand.  Does this test pose any risks?  Having a blood test with a needle carries some risks. These include bleeding, infection, bruising, and feeling lightheaded. When the needle pricks your arm or hand, you may feel a slight sting or pain. Afterward, the site may be sore.  What might affect my test results?  Your test results may be affected if you have tumors that require treatment with medicines that suppress your immune system. Your results may also be affected if you have HIV, AIDS, or another blood disorder.  How do I get ready for this test?  You don't need to prepare for this test.  Be sure your healthcare provider knows about all medicines, herbs, vitamins, and supplements you are taking. This includes medicines that don't need a prescription and any illegal drugs you may use.     3799-6261 [employer]. [address] [place] [address], [place] [address]. All rights reserved. This information is not intended as a substitute for professional medical care. Always follow your healthcare professional's instructions.

## 2020-06-20 DIAGNOSIS — Z11.1 SCREENING EXAMINATION FOR PULMONARY TUBERCULOSIS: ICD-10-CM

## 2020-06-20 PROCEDURE — 36415 COLL VENOUS BLD VENIPUNCTURE: CPT | Performed by: NURSE PRACTITIONER

## 2020-06-20 PROCEDURE — 86481 TB AG RESPONSE T-CELL SUSP: CPT | Performed by: NURSE PRACTITIONER

## 2020-06-22 LAB
GAMMA INTERFERON BACKGROUND BLD IA-ACNC: 0.08 IU/ML
M TB IFN-G BLD-IMP: NEGATIVE
M TB IFN-G CD4+ BCKGRND COR BLD-ACNC: 7.83 IU/ML
MITOGEN IGNF BCKGRD COR BLD-ACNC: 0.02 IU/ML
MITOGEN IGNF BCKGRD COR BLD-ACNC: 0.05 IU/ML

## 2020-11-07 ENCOUNTER — HEALTH MAINTENANCE LETTER (OUTPATIENT)
Age: 25
End: 2020-11-07

## 2020-11-16 ENCOUNTER — TELEPHONE (OUTPATIENT)
Dept: OBGYN | Facility: CLINIC | Age: 25
End: 2020-11-16

## 2020-11-16 ENCOUNTER — PRENATAL OFFICE VISIT (OUTPATIENT)
Dept: NURSING | Facility: CLINIC | Age: 25
End: 2020-11-16
Payer: COMMERCIAL

## 2020-11-16 ENCOUNTER — MYC MEDICAL ADVICE (OUTPATIENT)
Dept: OBGYN | Facility: CLINIC | Age: 25
End: 2020-11-16

## 2020-11-16 DIAGNOSIS — O36.80X0 PREGNANCY WITH INCONCLUSIVE FETAL VIABILITY: Primary | ICD-10-CM

## 2020-11-16 DIAGNOSIS — O21.9 NAUSEA AND VOMITING IN PREGNANCY: ICD-10-CM

## 2020-11-16 DIAGNOSIS — Z34.81 SUPERVISION OF NORMAL INTRAUTERINE PREGNANCY IN MULTIGRAVIDA IN FIRST TRIMESTER: Primary | ICD-10-CM

## 2020-11-16 PROCEDURE — 99207 PR NO CHARGE NURSE ONLY: CPT

## 2020-11-16 RX ORDER — ONDANSETRON 4 MG/1
4 TABLET, FILM COATED ORAL EVERY 8 HOURS PRN
Qty: 30 TABLET | Refills: 3 | Status: SHIPPED | OUTPATIENT
Start: 2020-11-16 | End: 2020-11-18 | Stop reason: ALTCHOICE

## 2020-11-16 NOTE — TELEPHONE ENCOUNTER
Rx sent to pharmacy.  MyChart msg to pt regarding recommendations.    Daphnie Chin RN on 11/16/2020 at 12:52 PM

## 2020-11-16 NOTE — TELEPHONE ENCOUNTER
"Completed nurse phone visit for new OB.    Pt unable to keep much food/water down, much nausea/vomiting, \"feels like crap all the time\"    Pt requesting rx for nausea because it has been difficult for her to take care of her 3 children.    Pt has not tried Vit B3 + Unisom for N/V, educated on administration details.    Routing to provider for review/recommendation.    Daphnie Chin RN on 11/16/2020 at 11:53 AM    "

## 2020-11-16 NOTE — PROGRESS NOTES
SUBJECTIVE:     HPI:    This is a 25 year old female patient,  who presents for her first obstetrical visit.    NIKIA: 2021, by Last Menstrual Period.  She is 8w0d weeks.  Her cycles are regular.  Her last menstrual period was normal.   Since her LMP, she has experienced  nausea, emesis, fatigue and headache).   She denies abdominal pain, loss of appetite, vaginal discharge, dysuria, pelvic pain, urinary urgency, lightheadedness, urinary frequency, vaginal bleeding, hemorrhoids and constipation.    Additional History: SABx2, TABx1,   smoker    Have you travelled during the pregnancy?No  Have your sexual partner(s) travelled during the pregnancy?No    HISTORY:   Planned Pregnancy: Yes  Marital Status:   Occupation: Medical Assistant  Living in Household: Spouse and Children and Other:  Raeann's brother, 's father    Past History:  Her past medical history   Past Medical History:   Diagnosis Date     Intrinsic eczema onset in winter since 12 y/o but in remission till recent delivery in 2017     Papanicolaou smear of cervix with atypical squamous cells of undetermined significance (ASC-US) 16   .      She has a history of  SABx2, TABx1    Since her last LMP she denies use of alcohol, tobacco and street drugs.    Past medical, surgical, social and family history were reviewed and updated in [facility].      Current Outpatient Medications   Medication     acetaminophen (TYLENOL) 325 MG tablet     No current facility-administered medications for this visit.        ROS:   12 point review of systems negative other than symptoms noted below or in the HPI.  Constitutional: Fatigue  Gastrointestinal: Nausea and Vomiting   Neurological:  Headache    Nurse phone visit completed. Prenatal book and folder (containing standard educational hand-outs and brochures) will be given to patient at next visit. Information in folder reviewed over the phone. Questions answered. Brochure given on  "optional screening available to assess chromosomal anomalies. Pt desires NIPT. Pt advised to call the clinic if she has any questions or concerns related to her pregnancy. Prenatal labs future ordered. New prenatal visit scheduled on 11/30/2020 with Dr. Chatterjee.      Lab Results   Component Value Date    PAP ASC-US 05/25/2016     PHQ-9 score:    PHQ 11/13/2020   PHQ-9 Total Score 0   Q9: Thoughts of better off dead/self-harm past 2 weeks Not at all     RACHELLE-7 SCORE 9/30/2019 11/13/2020   Total Score - 0 (minimal anxiety)   Total Score 4 0     Patient supplied answers from flow sheet for:  Prenatal OB Questionnaire.     Pt unable to keep much food/water down, much nausea/vomiting, \"feels like crap all the time\"    Pt requesting something for nausea (zofran?).  Pt has not tried Vit B3 + Unisom for N/V, educated on administration details.     Daphnie Chin RN on 11/16/2020 at 11:51 AM                   "

## 2020-11-16 NOTE — PROGRESS NOTES
Pt has NOB scheduled with ultrasound. Needed order to be placed. Future ultrasound order placed and linked with appt. Closing encounter.     Daphnie Chin RN on 11/16/2020 at 8:59 AM

## 2020-11-16 NOTE — LETTER
To Whom It May Concern:    Kd Garcia is currently under our care for a pregnancy with a due date of 6/28/2021. At this time, due to pregnancy related complications, she will not be able to be enrolled in school for the next 6 weeks. We will reevaluate at that time. Please contact us with any questions or concerns.     Thank you,  Elisabeth Lozano, NABILA, APRN, CNM

## 2020-11-16 NOTE — TELEPHONE ENCOUNTER
We can send zofran 4mg oral tablets every 8 hours. Dispense 30 with 3 refills. She can also start 25 mg vitamin B6  ( not B3) every 8 hours + 12.5mg of Doxylamine at night time.

## 2020-11-17 NOTE — TELEPHONE ENCOUNTER
Patient started taking Nausea medication and she is not doing any better. Please call patient back today.

## 2020-11-17 NOTE — TELEPHONE ENCOUNTER
New rx for zofran yesterday.  Pt stated that she took took the zofran at 6pm, felt it worked for about 4 hours.    Got up this morning with nausea.  Had small breakfast, sat for a while then took nap, when she woke up she threw up her breakfast.  Took zofran again and she fell back asleep.  Wakes to more nausea.  Even after small sips of water will throw it back up.  States she is unable to keep food or water down at all.    Feels dizzy and faint most of the time.      Routing to provider for recommendations.

## 2020-11-18 RX ORDER — METOCLOPRAMIDE 5 MG/1
5-10 TABLET ORAL 4 TIMES DAILY PRN
Qty: 112 TABLET | Refills: 1 | Status: SHIPPED | OUTPATIENT
Start: 2020-11-18 | End: 2020-11-30

## 2020-11-18 NOTE — TELEPHONE ENCOUNTER
I switched the medication to Reglan. I would like her to take 5-10 mg every 6-8 hours as needed.     If she is unable to keep fluids down and is dehydrated, then I need her to go to the ED. If she is able to keep some liquids down but the Reglan isn't helping then we can set her up for IV hydration, but she would first need a covid test so it cannot be done immediately (thus the need to go to the ED).    Elisabeth Lozano, DNP, APRN, CNM

## 2020-11-18 NOTE — TELEPHONE ENCOUNTER
I have written a note for Pa for an excused absence from school. It is located under letters. Will you please call her tomorrow and ask how she wants to get it? I have given her off until 16w. We can reevaluate at that time.     Thanks - Elisabeth

## 2020-11-24 ENCOUNTER — HOSPITAL ENCOUNTER (EMERGENCY)
Facility: CLINIC | Age: 25
Discharge: HOME | End: 2020-11-24
Attending: EMERGENCY MEDICINE | Admitting: EMERGENCY MEDICINE
Payer: COMMERCIAL

## 2020-11-24 VITALS
OXYGEN SATURATION: 99 % | WEIGHT: 142 LBS | BODY MASS INDEX: 27.73 KG/M2 | HEART RATE: 86 BPM | SYSTOLIC BLOOD PRESSURE: 93 MMHG | TEMPERATURE: 98.8 F | DIASTOLIC BLOOD PRESSURE: 66 MMHG | RESPIRATION RATE: 14 BRPM

## 2020-11-24 DIAGNOSIS — Z3A.09 9 WEEKS GESTATION OF PREGNANCY: ICD-10-CM

## 2020-11-24 DIAGNOSIS — O21.9 NAUSEA AND VOMITING IN PREGNANCY: ICD-10-CM

## 2020-11-24 LAB
ALBUMIN SERPL-MCNC: 4.2 G/DL (ref 3.4–5)
ALBUMIN UR-MCNC: 10 MG/DL
ALP SERPL-CCNC: 73 U/L (ref 40–150)
ALT SERPL W P-5'-P-CCNC: 30 U/L (ref 0–50)
ANION GAP SERPL CALCULATED.3IONS-SCNC: 9 MMOL/L (ref 3–14)
APPEARANCE UR: CLEAR
AST SERPL W P-5'-P-CCNC: 12 U/L (ref 0–45)
B-HCG SERPL-ACNC: ABNORMAL IU/L (ref 0–5)
BASOPHILS # BLD AUTO: 0.1 10E9/L (ref 0–0.2)
BASOPHILS NFR BLD AUTO: 0.8 %
BILIRUB SERPL-MCNC: 0.6 MG/DL (ref 0.2–1.3)
BILIRUB UR QL STRIP: NEGATIVE
BUN SERPL-MCNC: 12 MG/DL (ref 7–30)
CALCIUM SERPL-MCNC: 8.7 MG/DL (ref 8.5–10.1)
CHLORIDE SERPL-SCNC: 105 MMOL/L (ref 94–109)
CO2 SERPL-SCNC: 25 MMOL/L (ref 20–32)
COLOR UR AUTO: YELLOW
CREAT SERPL-MCNC: 0.55 MG/DL (ref 0.52–1.04)
DIFFERENTIAL METHOD BLD: NORMAL
EOSINOPHIL # BLD AUTO: 0.1 10E9/L (ref 0–0.7)
EOSINOPHIL NFR BLD AUTO: 0.6 %
ERYTHROCYTE [DISTWIDTH] IN BLOOD BY AUTOMATED COUNT: 11.9 % (ref 10–15)
GFR SERPL CREATININE-BSD FRML MDRD: >90 ML/MIN/{1.73_M2}
GLUCOSE SERPL-MCNC: 82 MG/DL (ref 70–99)
GLUCOSE UR STRIP-MCNC: >1000 MG/DL
HCT VFR BLD AUTO: 41.4 % (ref 35–47)
HGB BLD-MCNC: 13.9 G/DL (ref 11.7–15.7)
HGB UR QL STRIP: NEGATIVE
IMM GRANULOCYTES # BLD: 0 10E9/L (ref 0–0.4)
IMM GRANULOCYTES NFR BLD: 0.3 %
KETONES UR STRIP-MCNC: 40 MG/DL
LEUKOCYTE ESTERASE UR QL STRIP: NEGATIVE
LIPASE SERPL-CCNC: 93 U/L (ref 73–393)
LYMPHOCYTES # BLD AUTO: 2 10E9/L (ref 0.8–5.3)
LYMPHOCYTES NFR BLD AUTO: 19.2 %
MCH RBC QN AUTO: 30.2 PG (ref 26.5–33)
MCHC RBC AUTO-ENTMCNC: 33.6 G/DL (ref 31.5–36.5)
MCV RBC AUTO: 90 FL (ref 78–100)
MONOCYTES # BLD AUTO: 0.6 10E9/L (ref 0–1.3)
MONOCYTES NFR BLD AUTO: 5.5 %
MUCOUS THREADS #/AREA URNS LPF: PRESENT /LPF
NEUTROPHILS # BLD AUTO: 7.8 10E9/L (ref 1.6–8.3)
NEUTROPHILS NFR BLD AUTO: 73.6 %
NITRATE UR QL: NEGATIVE
NRBC # BLD AUTO: 0 10*3/UL
NRBC BLD AUTO-RTO: 0 /100
PH UR STRIP: 6.5 PH (ref 5–7)
PLATELET # BLD AUTO: 343 10E9/L (ref 150–450)
POTASSIUM SERPL-SCNC: 3.3 MMOL/L (ref 3.4–5.3)
PROT SERPL-MCNC: 8.1 G/DL (ref 6.8–8.8)
RBC # BLD AUTO: 4.6 10E12/L (ref 3.8–5.2)
RBC #/AREA URNS AUTO: 1 /HPF (ref 0–2)
SODIUM SERPL-SCNC: 139 MMOL/L (ref 133–144)
SOURCE: ABNORMAL
SP GR UR STRIP: 1.03 (ref 1–1.03)
SQUAMOUS #/AREA URNS AUTO: 4 /HPF (ref 0–1)
T4 FREE SERPL-MCNC: 1.1 NG/DL (ref 0.76–1.46)
TSH SERPL DL<=0.005 MIU/L-ACNC: 0.32 MU/L (ref 0.4–4)
UROBILINOGEN UR STRIP-MCNC: NORMAL MG/DL (ref 0–2)
WBC # BLD AUTO: 10.6 10E9/L (ref 4–11)
WBC #/AREA URNS AUTO: 1 /HPF (ref 0–5)

## 2020-11-24 PROCEDURE — 84439 ASSAY OF FREE THYROXINE: CPT | Performed by: EMERGENCY MEDICINE

## 2020-11-24 PROCEDURE — 84443 ASSAY THYROID STIM HORMONE: CPT | Performed by: EMERGENCY MEDICINE

## 2020-11-24 PROCEDURE — 99285 EMERGENCY DEPT VISIT HI MDM: CPT | Performed by: EMERGENCY MEDICINE

## 2020-11-24 PROCEDURE — 80053 COMPREHEN METABOLIC PANEL: CPT | Performed by: EMERGENCY MEDICINE

## 2020-11-24 PROCEDURE — 81001 URINALYSIS AUTO W/SCOPE: CPT | Performed by: EMERGENCY MEDICINE

## 2020-11-24 PROCEDURE — 85025 COMPLETE CBC W/AUTO DIFF WBC: CPT | Performed by: EMERGENCY MEDICINE

## 2020-11-24 PROCEDURE — 96374 THER/PROPH/DIAG INJ IV PUSH: CPT

## 2020-11-24 PROCEDURE — 96375 TX/PRO/DX INJ NEW DRUG ADDON: CPT

## 2020-11-24 PROCEDURE — 84702 CHORIONIC GONADOTROPIN TEST: CPT | Performed by: EMERGENCY MEDICINE

## 2020-11-24 PROCEDURE — 96361 HYDRATE IV INFUSION ADD-ON: CPT

## 2020-11-24 PROCEDURE — 83690 ASSAY OF LIPASE: CPT | Performed by: EMERGENCY MEDICINE

## 2020-11-24 PROCEDURE — 87086 URINE CULTURE/COLONY COUNT: CPT | Performed by: EMERGENCY MEDICINE

## 2020-11-24 PROCEDURE — 250N000011 HC RX IP 250 OP 636: Performed by: EMERGENCY MEDICINE

## 2020-11-24 PROCEDURE — 99284 EMERGENCY DEPT VISIT MOD MDM: CPT | Mod: 25

## 2020-11-24 RX ORDER — ONDANSETRON 2 MG/ML
4 INJECTION INTRAMUSCULAR; INTRAVENOUS EVERY 30 MIN PRN
Status: DISCONTINUED | OUTPATIENT
Start: 2020-11-24 | End: 2020-11-24 | Stop reason: HOSPADM

## 2020-11-24 RX ORDER — DOXYLAMINE SUCCINATE AND PYRIDOXINE HYDROCHLORIDE, DELAYED RELEASE TABLETS 10 MG/10 MG 10; 10 MG/1; MG/1
2 TABLET, DELAYED RELEASE ORAL AT BEDTIME
Qty: 30 TABLET | Refills: 0 | Status: SHIPPED | OUTPATIENT
Start: 2020-11-24 | End: 2021-02-05

## 2020-11-24 RX ORDER — THIAMINE HYDROCHLORIDE 100 MG/ML
100 INJECTION, SOLUTION INTRAMUSCULAR; INTRAVENOUS ONCE
Status: COMPLETED | OUTPATIENT
Start: 2020-11-24 | End: 2020-11-24

## 2020-11-24 RX ORDER — ONDANSETRON 4 MG/1
4 TABLET, FILM COATED ORAL EVERY 8 HOURS PRN
COMMUNITY
End: 2021-03-05

## 2020-11-24 RX ORDER — DEXTROSE, SODIUM CHLORIDE, SODIUM LACTATE, POTASSIUM CHLORIDE, AND CALCIUM CHLORIDE 5; .6; .31; .03; .02 G/100ML; G/100ML; G/100ML; G/100ML; G/100ML
INJECTION, SOLUTION INTRAVENOUS ONCE
Status: COMPLETED | OUTPATIENT
Start: 2020-11-24 | End: 2020-11-24

## 2020-11-24 RX ADMIN — SODIUM CHLORIDE, SODIUM LACTATE, POTASSIUM CHLORIDE, CALCIUM CHLORIDE AND DEXTROSE MONOHYDRATE: 5; 600; 310; 30; 20 INJECTION, SOLUTION INTRAVENOUS at 18:52

## 2020-11-24 RX ADMIN — ONDANSETRON 4 MG: 2 INJECTION INTRAMUSCULAR; INTRAVENOUS at 19:11

## 2020-11-24 RX ADMIN — THIAMINE HYDROCHLORIDE 100 MG: 100 INJECTION, SOLUTION INTRAMUSCULAR; INTRAVENOUS at 18:51

## 2020-11-24 NOTE — ED AVS SNAPSHOT
[facility]  [address]  [place] [address]  Phone: [phone]  Fax: [phone]                                    Kd Garcia   MRN: [id number]    Department: [facility]   Date of Visit: 11/24/2020           After Visit Summary Signature Page    I have received my discharge instructions, and my questions have been answered. I have discussed any challenges I see with this plan with the nurse or doctor.    ..........................................................................................................................................  Patient/Patient Representative Signature      ..........................................................................................................................................  Patient Representative Print Name and Relationship to Patient    ..................................................               ................................................  Date                                   Time    ..........................................................................................................................................  Reviewed by Signature/Title    ...................................................              ..............................................  Date                                               Time          22EPIC Rev 08/18

## 2020-11-25 LAB
BACTERIA SPEC CULT: NORMAL
Lab: NORMAL
SPECIMEN SOURCE: NORMAL

## 2020-11-25 NOTE — DISCHARGE INSTRUCTIONS
Please make an appointment to follow up with Your Primary Care Provider as soon as possible if you have any concerns.  Results for orders placed or performed during the hospital encounter of 11/24/20   CBC with platelets differential     Status: None   Result Value Ref Range    WBC 10.6 4.0 - 11.0 10e9/L    RBC Count 4.60 3.8 - 5.2 10e12/L    Hemoglobin 13.9 11.7 - 15.7 g/dL    Hematocrit 41.4 35.0 - 47.0 %    MCV 90 78 - 100 fl    MCH 30.2 26.5 - 33.0 pg    MCHC 33.6 31.5 - 36.5 g/dL    RDW 11.9 10.0 - 15.0 %    Platelet Count 343 150 - 450 10e9/L    Diff Method Automated Method     % Neutrophils 73.6 %    % Lymphocytes 19.2 %    % Monocytes 5.5 %    % Eosinophils 0.6 %    % Basophils 0.8 %    % Immature Granulocytes 0.3 %    Nucleated RBCs 0 0 /100    Absolute Neutrophil 7.8 1.6 - 8.3 10e9/L    Absolute Lymphocytes 2.0 0.8 - 5.3 10e9/L    Absolute Monocytes 0.6 0.0 - 1.3 10e9/L    Absolute Eosinophils 0.1 0.0 - 0.7 10e9/L    Absolute Basophils 0.1 0.0 - 0.2 10e9/L    Abs Immature Granulocytes 0.0 0 - 0.4 10e9/L    Absolute Nucleated RBC 0.0    Comprehensive metabolic panel     Status: Abnormal   Result Value Ref Range    Sodium 139 133 - 144 mmol/L    Potassium 3.3 (L) 3.4 - 5.3 mmol/L    Chloride 105 94 - 109 mmol/L    Carbon Dioxide 25 20 - 32 mmol/L    Anion Gap 9 3 - 14 mmol/L    Glucose 82 70 - 99 mg/dL    Urea Nitrogen 12 7 - 30 mg/dL    Creatinine 0.55 0.52 - 1.04 mg/dL    GFR Estimate >90 >60 mL/min/[1.73_m2]    GFR Estimate If Black >90 >60 mL/min/[1.73_m2]    Calcium 8.7 8.5 - 10.1 mg/dL    Bilirubin Total 0.6 0.2 - 1.3 mg/dL    Albumin 4.2 3.4 - 5.0 g/dL    Protein Total 8.1 6.8 - 8.8 g/dL    Alkaline Phosphatase 73 40 - 150 U/L    ALT 30 0 - 50 U/L    AST 12 0 - 45 U/L   TSH with free T4 reflex     Status: Abnormal   Result Value Ref Range    TSH 0.32 (L) 0.40 - 4.00 mU/L   Lipase     Status: None   Result Value Ref Range    Lipase 93 73 - 393 U/L   HCG quantitative pregnancy     Status: Abnormal    Result Value Ref Range    HCG Quantitative Serum 115,301 (H) 0 - 5 IU/L   T4 free     Status: None   Result Value Ref Range    T4 Free 1.10 0.76 - 1.46 ng/dL

## 2020-11-25 NOTE — ED PROVIDER NOTES
"    [facility] EMERGENCY DEPARTMENT ([facility])  20    History     Chief Complaint   Patient presents with     Morning Sickness     reports she is 7 weeks pregnant with her 4th pregnancy, for the past two weeks she has had nausea with vomiting and unable to keep food and fluids down, has an \"Icky\" feeling in her abdomen after she eats     History was provided by the patient and medical records.        Kd Garcia is a 25 year old female , approximately 9 weeks 1 day pregnant who presents for evaluation of nausea and vomiting.  Patient reports experiencing nausea and vomiting over the past 3 weeks.  She states it has been severe over the past 1.5-2 weeks.  Over the past several days she has been able to eat and has been vomiting up bile as well as any food she attempts to eat.  Patient reports some associated abdominal pain, lightheadedness and fatigue.  She also notes becoming shortness of breath after vomiting but denies chest pain or fevers.  She denies vaginal discharge or vaginal bleeding.  Patient notes feeling constipated which she states could be due to her use of Zofran.  She reports Zofran only alleviates her nausea and vomiting for a couple hours.  She has been using Zofran she estimates for the past 1 week.  She reports most recently taking a dose of Zofran at 11 AM today.  Patient reports having similar symptoms with her first pregnancy approximately 8 years ago.  She states her previous to have not been this severe.  Patient's last reported menstrual period occurred on 2020.  Of note, patient is scheduled to undergo transvaginal ultrasound on 11/3/2020, 6 days from now.    Past Medical History:   Diagnosis Date     Intrinsic eczema onset in winter since 14 y/o but in remission till recent delivery in 2017     Papanicolaou smear of cervix with atypical squamous cells of undetermined significance (ASC-US) 16       Past Surgical History:   Procedure Laterality " Date     HC OPEN RX DISTAL RADIUS FX, EXTRA-ARTICULAR  2008    left arm fx       Family History   Problem Relation Age of Onset     Unknown/Adopted Mother      C.A.D. Father 70        MI     Hypertension Father      Coronary Artery Disease Father      Cerebrovascular Disease Father      Diabetes No family hx of      Hyperlipidemia No family hx of      Breast Cancer No family hx of      Colon Cancer No family hx of      Prostate Cancer No family hx of      Other Cancer No family hx of      Depression No family hx of      Anxiety Disorder No family hx of      Mental Illness No family hx of      Substance Abuse No family hx of      Anesthesia Reaction No family hx of      Asthma No family hx of      Osteoporosis No family hx of      Genetic Disorder No family hx of      Thyroid Disease No family hx of      Obesity No family hx of        Social History     Tobacco Use     Smoking status: Former Smoker     Packs/day: 0.00     Smokeless tobacco: Never Used   Substance Use Topics     Alcohol use: Not Currently     Frequency: Monthly or less     Drinks per session: 3 or 4     Binge frequency: Less than monthly     No current facility-administered medications for this encounter.      Current Outpatient Medications   Medication     Doxylamine-Pyridoxine 10-10 MG TBEC     ondansetron (ZOFRAN) 4 MG tablet     metoclopramide (REGLAN) 5 MG tablet      No Known Allergies      Review of Systems  A complete review of systems was performed with pertinent positives and negatives noted in the HPI, and all other systems negative.    Physical Exam   BP: 117/79  Pulse: 86  Temp: 97.6  F (36.4  C)  Resp: 16  Weight: 64.4 kg (142 lb)  SpO2: 98 %  Physical Exam  Vitals signs and nursing note reviewed.   Constitutional:       General: She is not in acute distress.     Appearance: She is well-developed. She is not diaphoretic.      Comments: Comfortably resting, lying in bed, NAD, nondiaphoretic, lucid, fully conversant, no  respiratory  distress, alert and oriented.     HENT:      Head: Normocephalic and atraumatic.      Mouth/Throat:      Pharynx: No oropharyngeal exudate.   Eyes:      General: No scleral icterus.     Extraocular Movements: Extraocular movements intact.      Pupils: Pupils are equal, round, and reactive to light.   Neck:      Musculoskeletal: Normal range of motion and neck supple.   Cardiovascular:      Rate and Rhythm: Normal rate.   Pulmonary:      Effort: No respiratory distress.   Abdominal:      Palpations: Abdomen is soft.      Tenderness: There is no abdominal tenderness.   Musculoskeletal:         General: No tenderness.   Skin:     General: Skin is warm and dry.      Coloration: Skin is not pale.      Findings: No erythema or rash.   Neurological:      Mental Status: She is alert and oriented to person, place, and time.           ED Course     6:39 PM  The patient was seen and examined by Dr. Troy Henriquez in Room ED 05.     Procedures                         Results for orders placed or performed during the hospital encounter of 11/24/20   CBC with platelets differential     Status: None   Result Value Ref Range    WBC 10.6 4.0 - 11.0 10e9/L    RBC Count 4.60 3.8 - 5.2 10e12/L    Hemoglobin 13.9 11.7 - 15.7 g/dL    Hematocrit 41.4 35.0 - 47.0 %    MCV 90 78 - 100 fl    MCH 30.2 26.5 - 33.0 pg    MCHC 33.6 31.5 - 36.5 g/dL    RDW 11.9 10.0 - 15.0 %    Platelet Count 343 150 - 450 10e9/L    Diff Method Automated Method     % Neutrophils 73.6 %    % Lymphocytes 19.2 %    % Monocytes 5.5 %    % Eosinophils 0.6 %    % Basophils 0.8 %    % Immature Granulocytes 0.3 %    Nucleated RBCs 0 0 /100    Absolute Neutrophil 7.8 1.6 - 8.3 10e9/L    Absolute Lymphocytes 2.0 0.8 - 5.3 10e9/L    Absolute Monocytes 0.6 0.0 - 1.3 10e9/L    Absolute Eosinophils 0.1 0.0 - 0.7 10e9/L    Absolute Basophils 0.1 0.0 - 0.2 10e9/L    Abs Immature Granulocytes 0.0 0 - 0.4 10e9/L    Absolute Nucleated RBC 0.0    Comprehensive metabolic panel      Status: Abnormal   Result Value Ref Range    Sodium 139 133 - 144 mmol/L    Potassium 3.3 (L) 3.4 - 5.3 mmol/L    Chloride 105 94 - 109 mmol/L    Carbon Dioxide 25 20 - 32 mmol/L    Anion Gap 9 3 - 14 mmol/L    Glucose 82 70 - 99 mg/dL    Urea Nitrogen 12 7 - 30 mg/dL    Creatinine 0.55 0.52 - 1.04 mg/dL    GFR Estimate >90 >60 mL/min/[1.73_m2]    GFR Estimate If Black >90 >60 mL/min/[1.73_m2]    Calcium 8.7 8.5 - 10.1 mg/dL    Bilirubin Total 0.6 0.2 - 1.3 mg/dL    Albumin 4.2 3.4 - 5.0 g/dL    Protein Total 8.1 6.8 - 8.8 g/dL    Alkaline Phosphatase 73 40 - 150 U/L    ALT 30 0 - 50 U/L    AST 12 0 - 45 U/L   TSH with free T4 reflex     Status: Abnormal   Result Value Ref Range    TSH 0.32 (L) 0.40 - 4.00 mU/L   Lipase     Status: None   Result Value Ref Range    Lipase 93 73 - 393 U/L   UA with Microscopic     Status: Abnormal   Result Value Ref Range    Color Urine Yellow     Appearance Urine Clear     Glucose Urine >1000 (A) NEG^Negative mg/dL    Bilirubin Urine Negative NEG^Negative    Ketones Urine 40 (A) NEG^Negative mg/dL    Specific Gravity Urine 1.033 1.003 - 1.035    Blood Urine Negative NEG^Negative    pH Urine 6.5 5.0 - 7.0 pH    Protein Albumin Urine 10 (A) NEG^Negative mg/dL    Urobilinogen mg/dL Normal 0.0 - 2.0 mg/dL    Nitrite Urine Negative NEG^Negative    Leukocyte Esterase Urine Negative NEG^Negative    Source Midstream Urine     WBC Urine 1 0 - 5 /HPF    RBC Urine 1 0 - 2 /HPF    Squamous Epithelial /HPF Urine 4 (H) 0 - 1 /HPF    Mucous Urine Present (A) NEG^Negative /LPF   HCG quantitative pregnancy     Status: Abnormal   Result Value Ref Range    HCG Quantitative Serum 115,301 (H) 0 - 5 IU/L   T4 free     Status: None   Result Value Ref Range    T4 Free 1.10 0.76 - 1.46 ng/dL   Urine Culture     Status: None (Preliminary result)    Specimen: Urine Midstream; Midstream Urine   Result Value Ref Range    Specimen Description Midstream Urine     Special Requests Specimen received in preservative      Culture Micro PENDING      Medications   dextrose 5% in lactated ringers infusion ( Intravenous Stopped 11/24/20 2111)   thiamine (B-1) injection 100 mg (100 mg Intravenous Given 11/24/20 1851)        Assessments & Plan (with Medical Decision Making)   This is a 25-year-old female patient coming into the emergency room he states that she is 7 weeks pregnant and is having increasing vomiting at home.  Here in exam she is otherwise vitally stable.  She is resting comfortably.  She has no abdominal discomfort on exam.  All labs are reviewed which does show that she has a beta-hCG of 115,000 consistent with a 7-week pregnant gestation.  Her electrolytes are otherwise unremarkable.  She did receive IV fluids here in the emergency room as well as some thiamine and Zofran.  On reassessment patient is already drinking some water and states that she feels significantly better.  She does have Zofran at home but I will prescribe her with a short course of medication to help with her nausea for prophylaxis.  Patient to follow-up with the GYN clinic for further care management.  Pt was discharged home/self-care.  PT was provided written discharge instructions. Additionally verbal instructions were given and discussed with patient.  PT was asked to return to the ED immediately for any new or concerning symptoms.  Pt was in agreement, endorsed understanding, and questions were answered.    I have reviewed the nursing notes. I have reviewed the findings, diagnosis, plan and need for follow up with the patient.    Discharge Medication List as of 11/24/2020  9:11 PM      START taking these medications    Details   Doxylamine-Pyridoxine 10-10 MG TBEC Take 2 tablets by mouth At Bedtime, Disp-30 tablet, R-0, E-Prescribe             Final diagnoses:   Nausea and vomiting in pregnancy     I, Moi Saldaña, am serving as a trained medical scribe to document services personally performed by George Henriquez MD, based on the provider's  statements to me.      I, George Henriquez MD, was physically present and have reviewed and verified the accuracy of this note documented by Moi Saldaña.   --  George Henriquez MD  [facility] EMERGENCY DEPARTMENT  11/24/2020     George Henriquez MD  11/25/20 0008

## 2020-11-25 NOTE — ED NOTES
Patient presents today 8 weeks pregnant; reports having an appointment on Monday to start prenatal care. Patient has been having increased morning sickness for the past 2 weeks with worsening in the last week with not being able to keep anything down. Patient reports no appetite.

## 2020-11-26 NOTE — RESULT ENCOUNTER NOTE
Final urine culture report is NEGATIVE per [facility] ED Lab Result protocol.    If NEGATIVE result, no change in treatment, per [facility] ED Lab Result protocol.

## 2020-12-08 ENCOUNTER — ANCILLARY PROCEDURE (OUTPATIENT)
Dept: ULTRASOUND IMAGING | Facility: CLINIC | Age: 25
End: 2020-12-08
Payer: COMMERCIAL

## 2020-12-08 ENCOUNTER — PRENATAL OFFICE VISIT (OUTPATIENT)
Dept: MIDWIFE SERVICES | Facility: CLINIC | Age: 25
End: 2020-12-08
Payer: COMMERCIAL

## 2020-12-08 VITALS
DIASTOLIC BLOOD PRESSURE: 62 MMHG | HEIGHT: 60 IN | SYSTOLIC BLOOD PRESSURE: 110 MMHG | BODY MASS INDEX: 28.27 KG/M2 | HEART RATE: 68 BPM | WEIGHT: 144 LBS

## 2020-12-08 DIAGNOSIS — Z3A.09 9 WEEKS GESTATION OF PREGNANCY: ICD-10-CM

## 2020-12-08 DIAGNOSIS — O36.80X0 PREGNANCY WITH INCONCLUSIVE FETAL VIABILITY: ICD-10-CM

## 2020-12-08 DIAGNOSIS — O21.9 NAUSEA AND VOMITING IN PREGNANCY: ICD-10-CM

## 2020-12-08 DIAGNOSIS — Z34.81 SUPERVISION OF NORMAL INTRAUTERINE PREGNANCY IN MULTIGRAVIDA IN FIRST TRIMESTER: ICD-10-CM

## 2020-12-08 PROBLEM — O41.8X10 SUBCHORIONIC HEMATOMA IN FIRST TRIMESTER: Status: ACTIVE | Noted: 2020-12-08

## 2020-12-08 PROBLEM — O46.8X1 SUBCHORIONIC HEMATOMA IN FIRST TRIMESTER: Status: ACTIVE | Noted: 2020-12-08

## 2020-12-08 LAB
ALBUMIN UR-MCNC: NEGATIVE MG/DL
APPEARANCE UR: CLEAR
BACTERIA #/AREA URNS HPF: ABNORMAL /HPF
BILIRUB UR QL STRIP: NEGATIVE
COLOR UR AUTO: YELLOW
GLUCOSE UR STRIP-MCNC: NEGATIVE MG/DL
HGB UR QL STRIP: ABNORMAL
KETONES UR STRIP-MCNC: ABNORMAL MG/DL
LEUKOCYTE ESTERASE UR QL STRIP: NEGATIVE
NITRATE UR QL: NEGATIVE
NON-SQ EPI CELLS #/AREA URNS LPF: ABNORMAL /LPF
PH UR STRIP: 6 PH (ref 5–7)
RBC #/AREA URNS AUTO: ABNORMAL /HPF
SOURCE: ABNORMAL
SP GR UR STRIP: >1.03 (ref 1–1.03)
UROBILINOGEN UR STRIP-ACNC: 0.2 EU/DL (ref 0.2–1)
WBC #/AREA URNS AUTO: ABNORMAL /HPF

## 2020-12-08 PROCEDURE — 76801 OB US < 14 WKS SINGLE FETUS: CPT | Performed by: OBSTETRICS & GYNECOLOGY

## 2020-12-08 PROCEDURE — 87086 URINE CULTURE/COLONY COUNT: CPT | Performed by: OBSTETRICS & GYNECOLOGY

## 2020-12-08 PROCEDURE — 99214 OFFICE O/P EST MOD 30 MIN: CPT | Performed by: ADVANCED PRACTICE MIDWIFE

## 2020-12-08 PROCEDURE — 81001 URINALYSIS AUTO W/SCOPE: CPT | Performed by: OBSTETRICS & GYNECOLOGY

## 2020-12-08 ASSESSMENT — MIFFLIN-ST. JEOR: SCORE: 1318.65

## 2020-12-08 NOTE — PROGRESS NOTES
HPI:     This is a 25 year old female patient,  who presents for her first obstetrical visit.     NIKIA: 2021, by Last Menstrual Period.  She is 11w1d.  Her cycles are irregular.  Her last menstrual period was normal.  Since her LMP, she has experienced  nausea, emesis, fatigue and headache).   She denies abdominal pain, loss of appetite, vaginal discharge, dysuria, pelvic pain, urinary urgency, lightheadedness, urinary frequency, vaginal bleeding, hemorrhoids and constipation.  Sometimes feels like Zofran helps, and sometimes does not. Vomits 3-4 times a day. Would like to switch to something else. Thinks Zofran is contributing to constipation.   Still breastfeeds 1 years old, notices daughter is constipated too.    Having terrible headaches. Tylenol has not helped much. Is asking about taking Magnesium.   Additional History: SABx2, TABx1,   smoker     Have you travelled during the pregnancy?No  Have your sexual partner(s) travelled during the pregnancy?No     HISTORY:   Planned Pregnancy: Yes  Marital Status:   Occupation: Medical Assistant  Living in Household: Spouse and Children and Other:  Raeann's brother, 's father     Past History:  Her past medical history   Past Medical History        Past Medical History:   Diagnosis Date     Intrinsic eczema onset in winter since 14 y/o but in remission till recent delivery in 2017     Papanicolaou smear of cervix with atypical squamous cells of undetermined significance (ASC-US) 16      .       She has a history of  SABx2, TABx1     Since her last LMP she denies use of alcohol, tobacco and street drugs.     Past medical, surgical, social and family history were reviewed and updated in EPIC.            Current Outpatient Medications   Medication     acetaminophen (TYLENOL) 325 MG tablet      No current facility-administered medications for this visit.          ROS:   12 point review of systems negative other than symptoms  "noted below or in the HPI.  Constitutional: Fatigue  Gastrointestinal: Nausea and Vomiting   Neurological:  Headache     Nurse phone visit completed. Prenatal book and folder (containing standard educational hand-outs and brochures) will be given to patient at next visit. Information in folder reviewed over the phone. Questions answered. Brochure given on optional screening available to assess chromosomal anomalies. Pt desires NIPT. Pt advised to call the clinic if she has any questions or concerns related to her pregnancy. Prenatal labs future ordered. New prenatal visit scheduled on 2020 with Dr. Chatterjee.              Lab Results   Component Value Date     PAP ASC-US 2016      PHQ-9 score:    PHQ 2020   PHQ-9 Total Score 0   Q9: Thoughts of better off dead/self-harm past 2 weeks Not at all      RACHELLE-7 SCORE 2020   Total Score - 0 (minimal anxiety)   Total Score 4 0      Patient supplied answers from flow sheet for:  Prenatal OB Questionnaire.     Pt unable to keep much food/water down, much nausea/vomiting, \"feels like crap all the time\"     Pt requesting something for nausea (zofran?).  Pt has not tried Vit B3 + Unisom for N/V, educated on administration details.     Daphnie Chin RN on 2020 at 11:51 AM        OBJECTIVE:     EXAM:  /62   Pulse 68   Ht 1.53 m (5' 0.25\")   Wt 65.3 kg (144 lb)   LMP 2020   BMI 27.89 kg/m   Body mass index is 27.89 kg/m .    GENERAL: healthy, alert and no distress      ASSESSMENT/PLAN:       ICD-10-CM    1. Supervision of normal intrauterine pregnancy in multigravida in first trimester  Z34.81 *UA reflex to Microscopic     Urine Culture Aerobic Bacterial       25 year old , On 2020 patient is 11w1d weeks of pregnancy with NIKIA of 2021, by Last Menstrual Period        COUNSELING    Instructed on use of triage nurse line and contacting the on call CNM after hours in an emergency.     Briefly discussed " dating on US is not consistent with LMP. Will base EDC off of US dates.     Reviewed FAISAL, likely will resolve by 2nd trimester.    Will await MD reading of US for any further recommendations, if needed.    Symptoms of N&V and fatigue usually start to resolve around 12-16 weeks    Encouraged increasing hydration when able with water, and electrolyte fluids to help with headaches.    May take Magnesium 400 mg PO daily for headaches along with Tylenol and caffeine     Reviewed CNM philosophy, call schedule for labor and delivery, and FSH for delivery    1st OB handout given outlining appointment spacing and CNM information    Reviewed exercise and nutrition    Recommend to gain 15-25 pounds with her pregnancy.    Desires genetic screening. Would like to return in 1 weeks for Innatal and routine 1st OB labs in a single lab only visit.    Declined physical exam today due to nausea    Discussed OTC medications. OB med list given    Encouraged patient to take PNV's/DHA    Discussed COVID precautions.     Will call patient with lab results when available      Will return to the clinic in 4-5 weeks for her next routine prenatal check.  Will call to be seen sooner if problems arise.      Next visit: Physical exam with Pap  Check to be sure Innatal and 1st OB labs were done.     PETER BronsonM

## 2020-12-09 LAB
BACTERIA SPEC CULT: NO GROWTH
Lab: NORMAL
SPECIMEN SOURCE: NORMAL

## 2020-12-11 ENCOUNTER — MYC MEDICAL ADVICE (OUTPATIENT)
Dept: MIDWIFE SERVICES | Facility: CLINIC | Age: 25
End: 2020-12-11

## 2020-12-11 NOTE — TELEPHONE ENCOUNTER
Letter given for medical leave from school for Winter quarter due to significant n/v in pregnancy. Hard copy of letter in my basket if needed.     Shannon Knutson, PETER, CNM

## 2020-12-31 DIAGNOSIS — Z34.81 SUPERVISION OF NORMAL INTRAUTERINE PREGNANCY IN MULTIGRAVIDA IN FIRST TRIMESTER: ICD-10-CM

## 2020-12-31 DIAGNOSIS — B00.2 ORAL HERPES SIMPLEX INFECTION: Primary | ICD-10-CM

## 2020-12-31 LAB
ABO + RH BLD: NORMAL
ABO + RH BLD: NORMAL
BLD GP AB SCN SERPL QL: NORMAL
BLOOD BANK CMNT PATIENT-IMP: NORMAL
ERYTHROCYTE [DISTWIDTH] IN BLOOD BY AUTOMATED COUNT: 12.3 % (ref 10–15)
HCT VFR BLD AUTO: 39.1 % (ref 35–47)
HGB BLD-MCNC: 13.3 G/DL (ref 11.7–15.7)
MCH RBC QN AUTO: 30 PG (ref 26.5–33)
MCHC RBC AUTO-ENTMCNC: 34 G/DL (ref 31.5–36.5)
MCV RBC AUTO: 88 FL (ref 78–100)
PLATELET # BLD AUTO: 326 10E9/L (ref 150–450)
RBC # BLD AUTO: 4.43 10E12/L (ref 3.8–5.2)
SPECIMEN EXP DATE BLD: NORMAL
WBC # BLD AUTO: 11.5 10E9/L (ref 4–11)

## 2020-12-31 PROCEDURE — 99N1100 PR STATISTIC VERIFI PRENATAL TRISOMY 21,18,13: Mod: 90 | Performed by: OBSTETRICS & GYNECOLOGY

## 2020-12-31 PROCEDURE — 86850 RBC ANTIBODY SCREEN: CPT | Performed by: OBSTETRICS & GYNECOLOGY

## 2020-12-31 PROCEDURE — 86762 RUBELLA ANTIBODY: CPT | Performed by: ADVANCED PRACTICE MIDWIFE

## 2020-12-31 PROCEDURE — 86900 BLOOD TYPING SEROLOGIC ABO: CPT | Performed by: OBSTETRICS & GYNECOLOGY

## 2020-12-31 PROCEDURE — 86901 BLOOD TYPING SEROLOGIC RH(D): CPT | Performed by: OBSTETRICS & GYNECOLOGY

## 2020-12-31 PROCEDURE — 85027 COMPLETE CBC AUTOMATED: CPT | Performed by: ADVANCED PRACTICE MIDWIFE

## 2020-12-31 PROCEDURE — 99000 SPECIMEN HANDLING OFFICE-LAB: CPT | Performed by: ADVANCED PRACTICE MIDWIFE

## 2020-12-31 PROCEDURE — 87340 HEPATITIS B SURFACE AG IA: CPT | Performed by: ADVANCED PRACTICE MIDWIFE

## 2020-12-31 PROCEDURE — 84443 ASSAY THYROID STIM HORMONE: CPT | Performed by: ADVANCED PRACTICE MIDWIFE

## 2020-12-31 PROCEDURE — 86780 TREPONEMA PALLIDUM: CPT | Mod: 90 | Performed by: ADVANCED PRACTICE MIDWIFE

## 2020-12-31 PROCEDURE — 87389 HIV-1 AG W/HIV-1&-2 AB AG IA: CPT | Performed by: ADVANCED PRACTICE MIDWIFE

## 2020-12-31 PROCEDURE — 36415 COLL VENOUS BLD VENIPUNCTURE: CPT | Performed by: ADVANCED PRACTICE MIDWIFE

## 2021-01-01 LAB — TSH SERPL DL<=0.005 MIU/L-ACNC: 0.77 MU/L (ref 0.4–4)

## 2021-01-02 LAB — T PALLIDUM AB SER QL: NONREACTIVE

## 2021-01-03 LAB
HBV SURFACE AG SERPL QL IA: NONREACTIVE
HIV 1+2 AB+HIV1 P24 AG SERPL QL IA: NONREACTIVE

## 2021-01-04 LAB — RUBV IGG SERPL IA-ACNC: 12 IU/ML

## 2021-01-08 LAB — LAB SCANNED RESULT: NORMAL

## 2021-01-31 PROBLEM — Z34.82 SUPERVISION OF NORMAL INTRAUTERINE PREGNANCY IN MULTIGRAVIDA IN SECOND TRIMESTER: Status: ACTIVE | Noted: 2020-11-16

## 2021-02-02 NOTE — PROGRESS NOTES
Feels well, still having some nausea and fatigue, also had a lot with her other son.  Had a weird breakout on her upper lip, popped a zit and whole lip swelled up, has been using abbreva, has been there for a couple weeks. Had something similar when she was teenager after a piercing, no pain just itching.   Labs run today for HSV, will treat if positive or consider referral to derm  Fetal movement-Yes, sometimes  Denies loss of fluid/vb/contractions/pelvic pain  Depression screening done  requested AFP today  Anatomy ultrasound next visit between 18-22 weeks, order placed  Return to clinic 4 weeks    PETER Shelby, CNM

## 2021-02-05 ENCOUNTER — PRENATAL OFFICE VISIT (OUTPATIENT)
Dept: MIDWIFE SERVICES | Facility: CLINIC | Age: 26
End: 2021-02-05
Payer: COMMERCIAL

## 2021-02-05 VITALS — SYSTOLIC BLOOD PRESSURE: 102 MMHG | BODY MASS INDEX: 28.86 KG/M2 | WEIGHT: 149 LBS | DIASTOLIC BLOOD PRESSURE: 72 MMHG

## 2021-02-05 DIAGNOSIS — K13.0 LIP LESION: ICD-10-CM

## 2021-02-05 DIAGNOSIS — Z34.82 SUPERVISION OF NORMAL INTRAUTERINE PREGNANCY IN MULTIGRAVIDA IN SECOND TRIMESTER: Primary | ICD-10-CM

## 2021-02-05 LAB
HSV1 IGG SERPL QL IA: >8 AI (ref 0–0.8)
HSV2 IGG SERPL QL IA: <0.2 AI (ref 0–0.8)

## 2021-02-05 PROCEDURE — 86696 HERPES SIMPLEX TYPE 2 TEST: CPT | Performed by: ADVANCED PRACTICE MIDWIFE

## 2021-02-05 PROCEDURE — 82105 ALPHA-FETOPROTEIN SERUM: CPT | Mod: 90 | Performed by: ADVANCED PRACTICE MIDWIFE

## 2021-02-05 PROCEDURE — 86695 HERPES SIMPLEX TYPE 1 TEST: CPT | Performed by: ADVANCED PRACTICE MIDWIFE

## 2021-02-05 PROCEDURE — 99000 SPECIMEN HANDLING OFFICE-LAB: CPT | Performed by: ADVANCED PRACTICE MIDWIFE

## 2021-02-05 PROCEDURE — 99207 PR PRENATAL VISIT: CPT | Performed by: ADVANCED PRACTICE MIDWIFE

## 2021-02-05 PROCEDURE — 36415 COLL VENOUS BLD VENIPUNCTURE: CPT | Performed by: ADVANCED PRACTICE MIDWIFE

## 2021-02-05 RX ORDER — VALACYCLOVIR HYDROCHLORIDE 1 G/1
2000 TABLET, FILM COATED ORAL 2 TIMES DAILY
Qty: 4 TABLET | Refills: 0 | Status: SHIPPED | OUTPATIENT
Start: 2021-02-05 | End: 2021-07-09

## 2021-02-07 LAB
# FETUSES US: NORMAL
# FETUSES: NORMAL
AFP ADJ MOM AMN: 1.84
AFP SERPL-MCNC: 75 NG/ML
AGE - REPORTED: 26.9 YR
CURRENT SMOKER: NO
CURRENT SMOKER: NO
DIABETES STATUS PATIENT: NO
FAMILY MEMBER DISEASES HX: NO
FAMILY MEMBER DISEASES HX: NO
GA METHOD: NORMAL
GA METHOD: NORMAL
GA: NORMAL WK
IDDM PATIENT QL: NO
INTEGRATED SCN PATIENT-IMP: NORMAL
LMP START DATE: NORMAL
MONOCHORIONIC TWINS: NO
SERVICE CMNT-IMP: NO
SPECIMEN DRAWN SERPL: NORMAL
VALPROIC/CARBAMAZEPINE STATUS: NO
WEIGHT UNITS: NORMAL

## 2021-02-24 ENCOUNTER — MYC MEDICAL ADVICE (OUTPATIENT)
Dept: MIDWIFE SERVICES | Facility: CLINIC | Age: 26
End: 2021-02-24

## 2021-03-04 NOTE — PROGRESS NOTES
Feels well no concerns, starts school again next month   Fetal movement: positive   Denies loss of fluid/vb/contractions  Anatomy ultrasound results discussed; to be reviewed by Dr.Cremer BARKER, having a Boy, Placenta:  Posterior  Left EIF and pyelectasis reviewed, mild right 4mm, will review final report and if needed will schedule follow up  GCT visit between @ 28 weeks, reminded of longer appointment  Return to clinic 4 weeks    PETER Shelby, CNM

## 2021-03-05 ENCOUNTER — PRENATAL OFFICE VISIT (OUTPATIENT)
Dept: MIDWIFE SERVICES | Facility: CLINIC | Age: 26
End: 2021-03-05
Attending: ADVANCED PRACTICE MIDWIFE
Payer: COMMERCIAL

## 2021-03-05 ENCOUNTER — ANCILLARY PROCEDURE (OUTPATIENT)
Dept: ULTRASOUND IMAGING | Facility: CLINIC | Age: 26
End: 2021-03-05
Attending: ADVANCED PRACTICE MIDWIFE
Payer: COMMERCIAL

## 2021-03-05 VITALS — DIASTOLIC BLOOD PRESSURE: 74 MMHG | BODY MASS INDEX: 29.05 KG/M2 | SYSTOLIC BLOOD PRESSURE: 120 MMHG | WEIGHT: 150 LBS

## 2021-03-05 DIAGNOSIS — Z34.82 SUPERVISION OF NORMAL INTRAUTERINE PREGNANCY IN MULTIGRAVIDA IN SECOND TRIMESTER: ICD-10-CM

## 2021-03-05 DIAGNOSIS — Z34.82 SUPERVISION OF NORMAL INTRAUTERINE PREGNANCY IN MULTIGRAVIDA IN SECOND TRIMESTER: Primary | ICD-10-CM

## 2021-03-05 PROCEDURE — 76805 OB US >/= 14 WKS SNGL FETUS: CPT | Performed by: OBSTETRICS & GYNECOLOGY

## 2021-03-05 PROCEDURE — 99207 PR PRENATAL VISIT: CPT | Performed by: ADVANCED PRACTICE MIDWIFE

## 2021-03-23 DIAGNOSIS — O28.3 ABNORMAL ULTRASONIC FINDING ON ANTENATAL SCREENING OF MOTHER: ICD-10-CM

## 2021-03-23 DIAGNOSIS — Z34.82 SUPERVISION OF NORMAL INTRAUTERINE PREGNANCY IN MULTIGRAVIDA IN SECOND TRIMESTER: Primary | ICD-10-CM

## 2021-03-31 DIAGNOSIS — Z34.82 ENCOUNTER FOR SUPERVISION OF OTHER NORMAL PREGNANCY IN SECOND TRIMESTER: Primary | ICD-10-CM

## 2021-03-31 NOTE — PATIENT INSTRUCTIONS
"PREECLAMPSIA SIGNS AND SYMPTOMS    Preeclampsia is a dangerous condition that some women develop in the second half of pregnancy. It can also begin after the baby is born.  Preeclampsia causes high blood pressure and can cause problems with many organ systems in your body.  It can also affect the growth of your baby. The exact cause of preeclampsia is unknown, however, there are signs and symptoms to watch for:    -A bad headache that doesn't improve with Tylenol  -Visual changes such as spots, flashes of light, blurry vision  -Pain in the upper right part of your abdomen, especially under the ribs that doesn't go away  -Nausea and/or vomiting  -Feeling extremely tired  -Yellowing of the skin and/or eyes  -Feeling \"not quite right\" or that something is wrong  -An extreme amount of swelling (some swelling in pregnancy is very normal)    If your midwife feels that you are developing preeclampsia, you will have lab tests drawn and will be monitored very closely.     If you are experiencing anyof these symptoms, call the [facility] immediately at [phone].    SIGNS OF  LABOR    Labor is  if it happens more than three weeks before your due date.    It can be hard to know if you are in labor, since the symptoms can be like the normal feelings of pregnancy.  Often, the only difference is the symptoms increase or they don't go away.     Signs of  labor can include:      Contractions which can feel like period cramps or gas pain.  You may feel it in the lower part of your abdomen, in your back, or as a pressure feeling in your bottom.  It is often regular, coming every 5 or 10 minutes, and  lasting about 30-60 seconds. Some contractions are normal during pregnancy (Rolando rebolledo contractions) but if you are feeling more than 5-6 in one hour that is NOT normal    If this occurs empty your bladder, then drink 2-3 glasses of water, eat a snack, and lay down on your left side. Put your " hand on your abdomen to count the contractions.  If after one hour of resting you have still had 5-6 contractions call your clinic right away.      If you feel a pop, gush, or trickle of fluid it may mean that your bag of water has broken and you should contact the clinic       You may also experience loose stools and/or rectal pressure       Listen to your body, if something doesn't seem right please call us at the clinic    Risk Factors      Previous  delivery    Bacterial Vaginosis- if you notice a fishy smell to your discharge or experience vaginal itching/discomfort you should be evaluated for infection    Smoking    Drug abuse    Adolescent (teen) pregnancy or advanced maternal age (AMA) age 35 and over    Dehydration (this may not cause  labor but it can cause contractions)    If you think you are in  labor we may do some lab testing in the clinic or send you to the hospital for evaluation    Please call us if you are concerned you are in  labor.    [facility]  [phone]

## 2021-03-31 NOTE — PROGRESS NOTES
Feels well overall.  Fetal movement: positive, denies loss of fluid/vb/contractions  U/S for left EIF/ right pyelectasis done today: preliminary results show nonvis Lt EIF and Rt pyelectasis constant at 4 mm. Discussed that this is common and not concerning since it is stable, but will notify her of results/ recommendations when available.   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   Return to clinic 4 weeks.    Elisabeth Lozano, DNP, APRN, CNM

## 2021-04-01 ENCOUNTER — ANCILLARY PROCEDURE (OUTPATIENT)
Dept: ULTRASOUND IMAGING | Facility: CLINIC | Age: 26
End: 2021-04-01
Payer: COMMERCIAL

## 2021-04-01 ENCOUNTER — PRENATAL OFFICE VISIT (OUTPATIENT)
Dept: MIDWIFE SERVICES | Facility: CLINIC | Age: 26
End: 2021-04-01
Payer: COMMERCIAL

## 2021-04-01 VITALS — BODY MASS INDEX: 29.79 KG/M2 | WEIGHT: 153.8 LBS | DIASTOLIC BLOOD PRESSURE: 46 MMHG | SYSTOLIC BLOOD PRESSURE: 90 MMHG

## 2021-04-01 DIAGNOSIS — Z34.82 SUPERVISION OF NORMAL INTRAUTERINE PREGNANCY IN MULTIGRAVIDA IN SECOND TRIMESTER: ICD-10-CM

## 2021-04-01 DIAGNOSIS — Z34.82 ENCOUNTER FOR SUPERVISION OF OTHER NORMAL PREGNANCY IN SECOND TRIMESTER: ICD-10-CM

## 2021-04-01 DIAGNOSIS — Z34.82 SUPERVISION OF NORMAL INTRAUTERINE PREGNANCY IN MULTIGRAVIDA IN SECOND TRIMESTER: Primary | ICD-10-CM

## 2021-04-01 DIAGNOSIS — O28.3 ABNORMAL ULTRASONIC FINDING ON ANTENATAL SCREENING OF MOTHER: ICD-10-CM

## 2021-04-01 DIAGNOSIS — Z36.9 ENCOUNTER FOR ANTENATAL SCREENING OF MOTHER: ICD-10-CM

## 2021-04-01 PROBLEM — O46.8X1 SUBCHORIONIC HEMATOMA IN FIRST TRIMESTER: Status: RESOLVED | Noted: 2020-12-08 | Resolved: 2021-04-01

## 2021-04-01 PROBLEM — Z20.1 RECENT EXPOSURE TO TUBERCULOSIS: Status: RESOLVED | Noted: 2019-03-25 | Resolved: 2021-04-01

## 2021-04-01 PROBLEM — O41.8X10 SUBCHORIONIC HEMATOMA IN FIRST TRIMESTER: Status: RESOLVED | Noted: 2020-12-08 | Resolved: 2021-04-01

## 2021-04-01 PROBLEM — O35.EXX0 PYELECTASIS OF FETUS ON PRENATAL ULTRASOUND: Status: ACTIVE | Noted: 2021-04-01

## 2021-04-01 LAB
ERYTHROCYTE [DISTWIDTH] IN BLOOD BY AUTOMATED COUNT: 12.3 % (ref 10–15)
GLUCOSE 1H P 50 G GLC PO SERPL-MCNC: 129 MG/DL (ref 60–129)
HCT VFR BLD AUTO: 36.6 % (ref 35–47)
HGB BLD-MCNC: 11.7 G/DL (ref 11.7–15.7)
MCH RBC QN AUTO: 29.8 PG (ref 26.5–33)
MCHC RBC AUTO-ENTMCNC: 32 G/DL (ref 31.5–36.5)
MCV RBC AUTO: 93 FL (ref 78–100)
PLATELET # BLD AUTO: 252 10E9/L (ref 150–450)
RBC # BLD AUTO: 3.92 10E12/L (ref 3.8–5.2)
WBC # BLD AUTO: 11.7 10E9/L (ref 4–11)

## 2021-04-01 PROCEDURE — 86780 TREPONEMA PALLIDUM: CPT | Mod: 90 | Performed by: ADVANCED PRACTICE MIDWIFE

## 2021-04-01 PROCEDURE — 36415 COLL VENOUS BLD VENIPUNCTURE: CPT | Performed by: ADVANCED PRACTICE MIDWIFE

## 2021-04-01 PROCEDURE — 99000 SPECIMEN HANDLING OFFICE-LAB: CPT | Performed by: ADVANCED PRACTICE MIDWIFE

## 2021-04-01 PROCEDURE — 82950 GLUCOSE TEST: CPT | Performed by: ADVANCED PRACTICE MIDWIFE

## 2021-04-01 PROCEDURE — 99207 PR PRENATAL VISIT: CPT | Performed by: ADVANCED PRACTICE MIDWIFE

## 2021-04-01 PROCEDURE — 85027 COMPLETE CBC AUTOMATED: CPT | Performed by: ADVANCED PRACTICE MIDWIFE

## 2021-04-01 PROCEDURE — 76816 OB US FOLLOW-UP PER FETUS: CPT | Performed by: OBSTETRICS & GYNECOLOGY

## 2021-04-01 PROCEDURE — 86803 HEPATITIS C AB TEST: CPT | Performed by: ADVANCED PRACTICE MIDWIFE

## 2021-04-01 NOTE — PROGRESS NOTES
Syphilis is a sexually transmitted disease that can cause birth defects in the babies of untreated mothers. Every pregnant patient is tested for syphilis early in each pregnancy as part of the routine lab work. The [place] Department of [facility] has seen an increase in the rate of syphilis in [place]. The [facility] now recommends testing for syphilis 3 times during a pregnancy, the new prenatal visit, 28 weeks and when admitted for delivery. Patient accepts lab testing for syphilis.

## 2021-04-02 LAB
HCV AB SERPL QL IA: NONREACTIVE
T PALLIDUM AB SER QL: NONREACTIVE

## 2021-04-23 ENCOUNTER — NURSE TRIAGE (OUTPATIENT)
Dept: NURSING | Facility: CLINIC | Age: 26
End: 2021-04-23

## 2021-04-23 ENCOUNTER — HOSPITAL ENCOUNTER (EMERGENCY)
Facility: CLINIC | Age: 26
Discharge: HOME | End: 2021-04-24
Attending: EMERGENCY MEDICINE
Payer: COMMERCIAL

## 2021-04-23 DIAGNOSIS — R51.9 NONINTRACTABLE EPISODIC HEADACHE, UNSPECIFIED HEADACHE TYPE: ICD-10-CM

## 2021-04-23 LAB
ANION GAP SERPL CALCULATED.3IONS-SCNC: 5 MMOL/L (ref 3–14)
BASOPHILS # BLD AUTO: 0.1 10E9/L (ref 0–0.2)
BASOPHILS NFR BLD AUTO: 0.4 %
BUN SERPL-MCNC: 8 MG/DL (ref 7–30)
CALCIUM SERPL-MCNC: 8.4 MG/DL (ref 8.5–10.1)
CHLORIDE SERPL-SCNC: 109 MMOL/L (ref 94–109)
CO2 SERPL-SCNC: 24 MMOL/L (ref 20–32)
CREAT SERPL-MCNC: 0.44 MG/DL (ref 0.52–1.04)
DIFFERENTIAL METHOD BLD: ABNORMAL
EOSINOPHIL # BLD AUTO: 0.2 10E9/L (ref 0–0.7)
EOSINOPHIL NFR BLD AUTO: 1.7 %
ERYTHROCYTE [DISTWIDTH] IN BLOOD BY AUTOMATED COUNT: 11.9 % (ref 10–15)
GFR SERPL CREATININE-BSD FRML MDRD: >90 ML/MIN/{1.73_M2}
GLUCOSE SERPL-MCNC: 64 MG/DL (ref 70–99)
HCT VFR BLD AUTO: 35.5 % (ref 35–47)
HGB BLD-MCNC: 11.7 G/DL (ref 11.7–15.7)
IMM GRANULOCYTES # BLD: 0.1 10E9/L (ref 0–0.4)
IMM GRANULOCYTES NFR BLD: 0.6 %
LYMPHOCYTES # BLD AUTO: 2 10E9/L (ref 0.8–5.3)
LYMPHOCYTES NFR BLD AUTO: 16.3 %
MCH RBC QN AUTO: 29.5 PG (ref 26.5–33)
MCHC RBC AUTO-ENTMCNC: 33 G/DL (ref 31.5–36.5)
MCV RBC AUTO: 89 FL (ref 78–100)
MONOCYTES # BLD AUTO: 1 10E9/L (ref 0–1.3)
MONOCYTES NFR BLD AUTO: 8.3 %
NEUTROPHILS # BLD AUTO: 8.8 10E9/L (ref 1.6–8.3)
NEUTROPHILS NFR BLD AUTO: 72.7 %
NRBC # BLD AUTO: 0 10*3/UL
NRBC BLD AUTO-RTO: 0 /100
PLATELET # BLD AUTO: 268 10E9/L (ref 150–450)
POTASSIUM SERPL-SCNC: 3.5 MMOL/L (ref 3.4–5.3)
RBC # BLD AUTO: 3.97 10E12/L (ref 3.8–5.2)
SODIUM SERPL-SCNC: 138 MMOL/L (ref 133–144)
WBC # BLD AUTO: 12.1 10E9/L (ref 4–11)

## 2021-04-23 PROCEDURE — 258N000003 HC RX IP 258 OP 636: Performed by: EMERGENCY MEDICINE

## 2021-04-23 PROCEDURE — 85025 COMPLETE CBC W/AUTO DIFF WBC: CPT | Performed by: EMERGENCY MEDICINE

## 2021-04-23 PROCEDURE — 99285 EMERGENCY DEPT VISIT HI MDM: CPT | Mod: 25

## 2021-04-23 PROCEDURE — 81001 URINALYSIS AUTO W/SCOPE: CPT | Performed by: EMERGENCY MEDICINE

## 2021-04-23 PROCEDURE — 250N000011 HC RX IP 250 OP 636: Performed by: EMERGENCY MEDICINE

## 2021-04-23 PROCEDURE — 250N000013 HC RX MED GY IP 250 OP 250 PS 637: Performed by: EMERGENCY MEDICINE

## 2021-04-23 PROCEDURE — 96374 THER/PROPH/DIAG INJ IV PUSH: CPT

## 2021-04-23 PROCEDURE — 80048 BASIC METABOLIC PNL TOTAL CA: CPT | Performed by: EMERGENCY MEDICINE

## 2021-04-23 PROCEDURE — 96361 HYDRATE IV INFUSION ADD-ON: CPT

## 2021-04-23 RX ORDER — ACETAMINOPHEN 325 MG/1
650 TABLET ORAL ONCE
Status: COMPLETED | OUTPATIENT
Start: 2021-04-23 | End: 2021-04-23

## 2021-04-23 RX ORDER — DIPHENHYDRAMINE HYDROCHLORIDE 50 MG/ML
25 INJECTION INTRAMUSCULAR; INTRAVENOUS ONCE
Status: COMPLETED | OUTPATIENT
Start: 2021-04-23 | End: 2021-04-23

## 2021-04-23 RX ORDER — METOCLOPRAMIDE HYDROCHLORIDE 5 MG/ML
10 INJECTION INTRAMUSCULAR; INTRAVENOUS ONCE
Status: DISCONTINUED | OUTPATIENT
Start: 2021-04-23 | End: 2021-04-24 | Stop reason: HOSPADM

## 2021-04-23 RX ADMIN — ACETAMINOPHEN 650 MG: 325 TABLET, FILM COATED ORAL at 23:28

## 2021-04-23 RX ADMIN — DIPHENHYDRAMINE HYDROCHLORIDE 25 MG: 50 INJECTION, SOLUTION INTRAMUSCULAR; INTRAVENOUS at 23:28

## 2021-04-23 RX ADMIN — SODIUM CHLORIDE 1000 ML: 9 INJECTION, SOLUTION INTRAVENOUS at 23:28

## 2021-04-23 ASSESSMENT — ENCOUNTER SYMPTOMS
APPETITE CHANGE: 1
NAUSEA: 0
LIGHT-HEADEDNESS: 1
HEADACHES: 1
NECK PAIN: 1
DIZZINESS: 1

## 2021-04-23 ASSESSMENT — MIFFLIN-ST. JEOR: SCORE: 1364.58

## 2021-04-24 ENCOUNTER — APPOINTMENT (OUTPATIENT)
Dept: MRI IMAGING | Facility: CLINIC | Age: 26
End: 2021-04-24
Attending: EMERGENCY MEDICINE
Payer: COMMERCIAL

## 2021-04-24 VITALS
DIASTOLIC BLOOD PRESSURE: 62 MMHG | TEMPERATURE: 97.5 F | BODY MASS INDEX: 30.43 KG/M2 | WEIGHT: 155 LBS | RESPIRATION RATE: 16 BRPM | OXYGEN SATURATION: 97 % | HEART RATE: 83 BPM | HEIGHT: 60 IN | SYSTOLIC BLOOD PRESSURE: 106 MMHG

## 2021-04-24 LAB
ALBUMIN UR-MCNC: 10 MG/DL
APPEARANCE UR: CLEAR
BILIRUB UR QL STRIP: NEGATIVE
COLOR UR AUTO: ABNORMAL
GLUCOSE UR STRIP-MCNC: NEGATIVE MG/DL
HGB UR QL STRIP: NEGATIVE
KETONES UR STRIP-MCNC: NEGATIVE MG/DL
LEUKOCYTE ESTERASE UR QL STRIP: NEGATIVE
MUCOUS THREADS #/AREA URNS LPF: PRESENT /LPF
NITRATE UR QL: NEGATIVE
PH UR STRIP: 6.5 PH (ref 5–7)
RBC #/AREA URNS AUTO: 0 /HPF (ref 0–2)
SOURCE: ABNORMAL
SP GR UR STRIP: 1.02 (ref 1–1.03)
SQUAMOUS #/AREA URNS AUTO: 7 /HPF (ref 0–1)
UROBILINOGEN UR STRIP-MCNC: 0 MG/DL (ref 0–2)
WBC #/AREA URNS AUTO: <1 /HPF (ref 0–5)

## 2021-04-24 PROCEDURE — 70551 MRI BRAIN STEM W/O DYE: CPT

## 2021-04-24 PROCEDURE — 70544 MR ANGIOGRAPHY HEAD W/O DYE: CPT

## 2021-04-24 PROCEDURE — 96361 HYDRATE IV INFUSION ADD-ON: CPT

## 2021-04-24 ASSESSMENT — ENCOUNTER SYMPTOMS
FEVER: 0
CONFUSION: 0
CHILLS: 0
MYALGIAS: 0

## 2021-04-24 NOTE — ED PROVIDER NOTES
History     Chief Complaint:  Headache    The history is provided by the patient.      Kd Garcia is a 25 year old female 28 weeks gestation with a history of hypotension, as reported by the patient, who presents with a headache. The headache started 2 days ago while in bed, and has progressively worsened in the interim. The headache has not gone away in the two days since development with pain waxing and waning in intensity.  The pain can be dull or throbbing at times. Current pain is a 4.5/10 and the severest pain is a 7.5/10. Pain has moved from the left to the posterior of her head. She also reports difficulty sleeping due to pain as well as lightheadedness/dizziness, neck pain with movement, and increase of pain when moving from laying to standing. She has tried tylenol and sleeping, which have worked in the past, with no relief of the symptoms. Patient has found that sleeping increases pain upon awakening. She reports that she has been drinking fluids but has a decrease in appetite that she attributes to the pregnancy. Patient denies nausea, fever, chills, body aches, balance issues, confusion. No recent trauma to the head. There is no known family history of clotting or aneurysm. The patient receives her OB/Gyn care from [facility].       Review of Systems   Constitutional: Positive for appetite change. Negative for chills and fever.   Gastrointestinal: Negative for nausea.   Musculoskeletal: Positive for neck pain. Negative for myalgias.   Neurological: Positive for dizziness, light-headedness and headaches.   Psychiatric/Behavioral: Negative for confusion.   All other systems reviewed and are negative.        Allergies:  No Known Allergies      Medications:    The patient does not take any prescribed medications.    Past Medical History:    Intrinsic eczema  ASC-UC  Pyelectasis of fetus      Past Surgical History:    Open reduction distal radius fracture     Family  History:    Father - CAD, MI, hypertension  Mother - unknown/adopted     Social History:  Unaccompanied to the ED.   Lives with  and two daughters.     Physical Exam     Patient Vitals for the past 24 hrs:   BP Temp Temp src Pulse Resp SpO2 Height Weight   21 0118 106/62 -- -- 83 -- 97 % -- --   21 0100 106/62 -- -- -- -- 98 % -- --   21 0000 104/66 -- -- 83 -- 100 % -- --   21 2300 109/72 -- -- 87 -- 98 % -- --   21 2106 126/73 97.5  F (36.4  C) Temporal 67 16 99 % 1.524 m (5') 70.3 kg (155 lb)       Physical Exam  Constitutional:  Cooperative.   HENT:   Head:    Atraumatic.   Mouth/Throat:   Oropharynx is without erythema or exudate and mucous membranes are moist.   Eyes:   Conjunctivae normal and EOM are normal.  No photophobia     Pupils are equal, round, and reactive to light. No papilledema. Portions of the retina visualized in both eyes look unremarkable.   Neck:    Normal range of motion.  No nuchal rigidity  Cardiovascular:  Normal rate, regular rhythm, normal heart sounds and radial and dorsalis pedis pulses are 2+ and symmetric.    Pulmonary/Chest:  Effort normal and breath sounds normal.   Abdominal:   Soft. Bowel sounds are normal.      No splenomegaly or hepatomegaly. No tenderness. No rebound.   Pelvic:                          uterus  Musculoskeletal:  Normal range of motion. No edema and no tenderness.   Neurological:  Alert. Normal strength. No cranial nerve deficit. GCS 15.  Skin:    Skin is warm and dry.   Psychiatric:   Normal mood and affect.     Emergency Department Course     Imaging:  MR Brain w/o Contrast   IMPRESSION:   HEAD MRI:   1.  Normal head MRI for age.   2.  No acute infarct, mass, or hemorrhage.   As Reported by Radiology    HEAD MRV:  IMPRESSION:  1.  No dural venous sinus thrombosis or significant stenosis.  As Reported By Radiology    Laboratory:  UA with microscopic: Protein Albumin 10, Squamous Epithelial 7 (H), Mucous Present o/w  WNL   CBC: WBC 12.1 (H), HGB 11.7,    BMP: Glucose 64 (L) Calcium 8.4 (L) Creatinine 0.44 (L) o/w WNL     Emergency Department Course:    Reviewed:  2245 I reviewed nursing notes, vitals, past history and care everywhere    Assessments:  2250 I obtained history and examined the patient as noted above.   0053 I rechecked the patient and explained findings.    Interventions:  2328 REGLAN 10mg IV  2328 BENADRYL IV 25mg  2328 TYLENOL 650mg PO  0108 NS 1L IV Bolus      Disposition:  The patient was discharged to home.    Impression & Plan        Medical Decision Making:    The patient presented to the ED for headache.  The patient has a history of headaches.   Alternate diagnoses such as subarachnoid hemorrhage, carotid or vertebral dissection, meningitis, and spontaneous intracranial hemorrhage were considered.  There were no historical or exam features to suggest a secondary cause of headache.  The patient had no fever, no petechial rash, no nuchal rigidity and no focal neurologic deficits.    Because the patient's headache is positional, worse when lying down, and she is 28 weeks pregnant I was more concerned for a venous thrombosis. Fortunately MRI and MRV of the brain are both normal.     Patient said when we discussed her results that she suspects her headaches maybe due to increasing difficulty with sleeping and fatigue. I also noted that she had a ton of protein in her urine. Her nurse midwife will continue to follow this. No other signs or symptoms suggesting pre-eclampsia. She will follow up with OB/Gyn next week.      The patient was treated symptomatically in the emergency department with her she will relief.  The patient was discharged home.        Diagnosis:    ICD-10-CM    1. Nonintractable episodic headache, unspecified headache type  R51.9        Discharge Medications:  Discharge Medication List as of 4/24/2021  1:09 AM            Scribmadina Disclosure:  Zahra PARKER and Christina Braun, am serving  as a scribe at 10:49 PM on 4/23/2021 to document services personally performed by Santino Guerrero MD based on my observations and the provider's statements to me.      Santino Guerrero MD  04/24/21 0704

## 2021-04-24 NOTE — ED NOTES
MRI checklist faxed.  Regina Villarreal RN,.......................................... 4/23/2021   11:45 PM

## 2021-04-24 NOTE — ED NOTES
Assumed care.  Regina Villarreal RN,.......................................... 4/23/2021   11:10 PM

## 2021-04-24 NOTE — ED NOTES
Sudden onset, intermittent headache for the last few days. Tylenol has not helped. Pt is 28 weeks pregnant. Has no complaints other than headache.

## 2021-04-24 NOTE — TELEPHONE ENCOUNTER
"\"I am 28 weeks pregnant and today is day 3 of having a headache. I have taken Tylenol, tried to sleep it off, but nothing is helping. It comes on fast and it really hurts.\"  Denies swelling or other sx  Ttriaged and advised ED per protocol.  Minoo Walton RN [facility]        Reason for Disposition    Patient sounds very sick or weak to the triager    Additional Information    Negative: [1] SEVERE headache (e.g., excruciating) AND [2] having contractions or other symptoms of labor    Negative: [1] Pregnant > 20 weeks AND [2] Systolic BP >= 140 OR Diastolic BP >= 90    Negative: [1] Fever > 100.0 F (37.8 C) AND [2] diabetes mellitus or weak immune system (e.g., HIV positive, cancer chemo, splenectomy, organ transplant, chronic steroids)    Negative: Loss of vision or double vision (Exception: similar to previous migraines)    Negative: [1] SEVERE headache AND [2] fever    Negative: [1] SEVERE headache (e.g., excruciating) AND [2] \"worst headache\" of life    Protocols used: PREGNANCY - HEADACHE-A-AH      "

## 2021-05-04 PROBLEM — Z34.83 SUPERVISION OF NORMAL INTRAUTERINE PREGNANCY IN MULTIGRAVIDA IN THIRD TRIMESTER: Status: ACTIVE | Noted: 2020-11-16

## 2021-05-10 ENCOUNTER — PRENATAL OFFICE VISIT (OUTPATIENT)
Dept: MIDWIFE SERVICES | Facility: CLINIC | Age: 26
End: 2021-05-10
Payer: COMMERCIAL

## 2021-05-10 VITALS — BODY MASS INDEX: 31.05 KG/M2 | SYSTOLIC BLOOD PRESSURE: 110 MMHG | DIASTOLIC BLOOD PRESSURE: 60 MMHG | WEIGHT: 159 LBS

## 2021-05-10 DIAGNOSIS — Z34.83 SUPERVISION OF NORMAL INTRAUTERINE PREGNANCY IN MULTIGRAVIDA IN THIRD TRIMESTER: Primary | ICD-10-CM

## 2021-05-10 PROCEDURE — 99207 PR PRENATAL VISIT: CPT | Performed by: ADVANCED PRACTICE MIDWIFE

## 2021-05-10 NOTE — PROGRESS NOTES
Feels well.  No complaints.  Some HA.  Seen in the ED.   Relates them to being tired.   Fetal Movement: positive, denies loss of fluid/vb, BH contractions no more than 10 per day.   Discussed COVID vaccine, will consider.   Reviewed PTL precautions and s/sx of preeclampsia; denies any S&S and aware of what to report  Baby Need Boxes handout given No      Return to clinic in 2 weeks    Kathryn Oconnell, PETER, CNM

## 2021-05-25 ENCOUNTER — PRENATAL OFFICE VISIT (OUTPATIENT)
Dept: MIDWIFE SERVICES | Facility: CLINIC | Age: 26
End: 2021-05-25
Payer: COMMERCIAL

## 2021-05-25 VITALS — SYSTOLIC BLOOD PRESSURE: 92 MMHG | WEIGHT: 157 LBS | DIASTOLIC BLOOD PRESSURE: 52 MMHG | BODY MASS INDEX: 30.66 KG/M2

## 2021-05-25 DIAGNOSIS — Z34.83 SUPERVISION OF NORMAL INTRAUTERINE PREGNANCY IN MULTIGRAVIDA IN THIRD TRIMESTER: ICD-10-CM

## 2021-05-25 DIAGNOSIS — Z86.19 H/O COLD SORES: ICD-10-CM

## 2021-05-25 PROCEDURE — 99207 PR PRENATAL VISIT: CPT | Performed by: ADVANCED PRACTICE MIDWIFE

## 2021-05-25 NOTE — PROGRESS NOTES
Kd Garcia presents to clinic alone at 33w1d for a routine prenatal appointment. She reports she is feeling well and has no concerns. Fetal movement is changing in quality, but overall feels like the quantity is the same. Reviewed snack + cold water + count fetal movements if concerns for DFM, then call on-call CNM. Denies bleeding, LOF, or regular, painful contractions. Denies headache / vision changes / RUQ pain.    Reviewed total weight gain of 5.443 kg (12 lb). Within range for expected total weight gain of 7 kg (15 lb)-11.5 kg (25 lb)    COVID-19 Mitigation: considering vaccination-- probably postpartum. Foua has not been vaccinated, either-- reviewed rational for as many people in the household to be vaccinated as possible.  Pregnancy checklist updated. Offer tdap at [place]. Warning signs reviewed. RTC in 2 weeks, sooner if problems.

## 2021-05-28 ENCOUNTER — NURSE TRIAGE (OUTPATIENT)
Dept: NURSING | Facility: CLINIC | Age: 26
End: 2021-05-28

## 2021-05-28 ENCOUNTER — HOSPITAL ENCOUNTER (OUTPATIENT)
Facility: CLINIC | Age: 26
Discharge: HOME | End: 2021-05-28
Attending: ADVANCED PRACTICE MIDWIFE | Admitting: ADVANCED PRACTICE MIDWIFE
Payer: COMMERCIAL

## 2021-05-28 DIAGNOSIS — B96.89 BACTERIAL VAGINOSIS IN PREGNANCY: Primary | ICD-10-CM

## 2021-05-28 DIAGNOSIS — O23.599 BACTERIAL VAGINOSIS IN PREGNANCY: Primary | ICD-10-CM

## 2021-05-28 LAB
ALBUMIN UR-MCNC: NEGATIVE MG/DL
APPEARANCE UR: CLEAR
BACTERIA #/AREA URNS HPF: ABNORMAL /HPF
BILIRUB UR QL STRIP: NEGATIVE
COLOR UR AUTO: ABNORMAL
FIBRONECTIN FETAL VAG QL: POSITIVE
GLUCOSE UR STRIP-MCNC: NEGATIVE MG/DL
GRAM STN SPEC: ABNORMAL
HGB UR QL STRIP: NEGATIVE
KETONES UR STRIP-MCNC: 80 MG/DL
LEUKOCYTE ESTERASE UR QL STRIP: NEGATIVE
Lab: ABNORMAL
MUCOUS THREADS #/AREA URNS LPF: PRESENT /LPF
NITRATE UR QL: NEGATIVE
PH UR STRIP: 6.5 PH (ref 5–7)
RBC #/AREA URNS AUTO: 0 /HPF (ref 0–2)
SOURCE: ABNORMAL
SP GR UR STRIP: 1.02 (ref 1–1.03)
SPECIMEN SOURCE: ABNORMAL
SPECIMEN SOURCE: ABNORMAL
SQUAMOUS #/AREA URNS AUTO: 11 /HPF (ref 0–1)
UROBILINOGEN UR STRIP-MCNC: 0 MG/DL (ref 0–2)
WBC #/AREA URNS AUTO: 3 /HPF (ref 0–5)
WET PREP SPEC: ABNORMAL

## 2021-05-28 PROCEDURE — G0463 HOSPITAL OUTPT CLINIC VISIT: HCPCS

## 2021-05-28 PROCEDURE — 87205 SMEAR GRAM STAIN: CPT | Performed by: ADVANCED PRACTICE MIDWIFE

## 2021-05-28 PROCEDURE — 87210 SMEAR WET MOUNT SALINE/INK: CPT | Performed by: ADVANCED PRACTICE MIDWIFE

## 2021-05-28 PROCEDURE — 87102 FUNGUS ISOLATION CULTURE: CPT | Performed by: ADVANCED PRACTICE MIDWIFE

## 2021-05-28 PROCEDURE — 82731 ASSAY OF FETAL FIBRONECTIN: CPT | Performed by: ADVANCED PRACTICE MIDWIFE

## 2021-05-28 PROCEDURE — 99215 OFFICE O/P EST HI 40 MIN: CPT | Mod: 25 | Performed by: ADVANCED PRACTICE MIDWIFE

## 2021-05-28 PROCEDURE — 59025 FETAL NON-STRESS TEST: CPT | Mod: 26 | Performed by: ADVANCED PRACTICE MIDWIFE

## 2021-05-28 PROCEDURE — 81001 URINALYSIS AUTO W/SCOPE: CPT | Performed by: ADVANCED PRACTICE MIDWIFE

## 2021-05-28 RX ORDER — ONDANSETRON 2 MG/ML
4 INJECTION INTRAMUSCULAR; INTRAVENOUS EVERY 6 HOURS PRN
Status: DISCONTINUED | OUTPATIENT
Start: 2021-05-28 | End: 2021-05-28 | Stop reason: HOSPADM

## 2021-05-28 RX ORDER — METRONIDAZOLE 500 MG/1
500 TABLET ORAL 2 TIMES DAILY
Qty: 14 TABLET | Refills: 0 | Status: SHIPPED | OUTPATIENT
Start: 2021-05-28 | End: 2021-06-04

## 2021-05-28 NOTE — TELEPHONE ENCOUNTER
OB Triage Call    Reason for call: Cramping    Assessment: Cramping started around 0100 in the morning on Thursday.  It started off on the left side and very little cramps, it started getting worse and almost like a stabbing pain.  It was radiating underneath her abdomen to the other side.  When she got up to use the bathroom, she had to bend over to walk.  It was like that for 1 hour.  The pain after that got less, but then started having intermittent tightening in her abodmen approx every 2 minutes and lasting 15-20 seconds at a time.       Today is also having very frequent lemuel rebolledo with pain in lower abdomen, which started with the cramps yesterday and slowed down during the night with sleeping.  This am she didn't feel anything, but after going to school and in class, she is having more [place] rebolledo, happening every 2-3 minutes.  She is having a lot of pressure in her pelvic area and that is new today.      Babies movement today is less than yesterday.          Plan: L&D triage    Patient plans to deliver at [facility]  Patient's primary OB Provider is Midwife.    Patient's primary OB provider is a Midwife. No- RN notified Midwife on-call Grecia Mackey at 3:07pm.  Recommendation per on-call midwife: Patient needs to go into the L&D triage Per Mayra BURDICK on-call for [facility].     Report called to [facility] L&D triage, report given to Michelle.    Called pateint back and provided instructions to go to L&D triage from [facility] on-call.          33w4d  Estimated Date of Delivery: 2021        OB History    Para Term  AB Living   7 3 3 0 3 3   SAB TAB Ectopic Multiple Live Births   2 1 0 0 3      # Outcome Date GA Lbr Josh/2nd Weight Sex Delivery Anes PTL Lv   7 Current            6 Term 19 38w5d 06:10 / 00:11 2.9 kg (6 lb 6.3 oz) F Vag-Spont None N MARGARET      Name: Flor      Apgar1: 8  Apgar5: 9   5 Term 17 40w0d 04:18 / 00:05 3.175 kg (7 lb) F Vag-Spont None N  MARGARET      Name: VIOLA DENISE PA      Apgar1: 8  Apgar5: 9   4 2016      3 2016      2 TAB 2015     TAB      1 Term 12 38w4d 05:42 / 00:09 2.92 kg (6 lb 7 oz) M Vag-Spont Local N MARGARET      Name: JUSTIN DENISE PA      Apgar1: 9  Apgar5: 9       Lab Results   Component Value Date    GBS Negative 2019          Ximena Beth RN 21 2:53 PM  [facility] Nurse Advisor    Reason for Disposition    Baby moving less today (e.g., kick count < 5 in 1 hour or < 10 in 2 hours) and 23 or more weeks pregnant    Additional Information    Negative: Passed out (i.e., fainted, collapsed and was not responding)    Negative: Shock suspected (e.g., cold/pale/clammy skin, too weak to stand, low BP, rapid pulse)    Negative: Difficult to awaken or acting confused (e.g., disoriented, slurred speech)    Negative: SEVERE abdominal pain (e.g., excruciating), constant, and present > 1 hour    Negative: SEVERE vaginal bleeding (e.g., continuous red blood from vagina, large blood clots)    Negative: Sounds like a life-threatening emergency to the triager    Negative: Having contractions or other symptoms of labor (such as vaginal pressure) and > 36 weeks pregnant (i.e., term pregnancy)    Negative: Having contractions or other symptoms of labor (such as vaginal pressure) and < 37 weeks pregnant (i.e., )    Negative: Abdominal pain and pregnant < 20 weeks    Negative: Vomiting red blood or black (coffee ground) material    Negative: Vaginal bleeding or spotting    Negative: MODERATE-SEVERE abdominal pain (e.g., interferes with normal activities, awakens from sleep)    Protocols used: PREGNANCY - ABDOMINAL PAIN GREATER THAN 20 WEEKS EGA-A-OH

## 2021-05-28 NOTE — DISCHARGE INSTRUCTIONS
Discharge Instruction for Undelivered Patients      You were seen for: Labor Assessment  We Consulted: Mayra Felix CNM  You had (Test or Medicine):fetal and uterine monitoring, labwork     Diet:   Drink 8 to 12 glasses of liquids (milk, juice, water) every day.  You may eat meals and snacks.     Activity:  Call your doctor or nurse midwife if your baby is moving less than usual.     Call your provider if you notice:  Swelling in your face or increased swelling in your hands or legs.  Headaches that are not relieved by Tylenol (acetaminophen).  Changes in your vision (blurring: seeing spots or stars.)  Nausea (sick to your stomach) and vomiting (throwing up).   Weight gain of 5 pounds or more per week.  Heartburn that doesn't go away.  Signs of bladder infection: pain when you urinate (use the toilet), need to go more often and more urgently.  The bag of ramos (rupture of membranes) breaks, or you notice leaking in your underwear.  Bright red blood in your underwear.  Abdominal (lower belly) or stomach pain.  Second (plus) baby: Contractions (tightening) less than 10 minutes apart and getting stronger.  *If less than 34 weeks: Contractions (tightenings) more than 6 times in one hour.  Increase or change in vaginal discharge (note the color and amount)      Follow-up:  As scheduled in the clinic

## 2021-05-28 NOTE — PROGRESS NOTES
[facility] CNM TRIAGE NOTE    Pa Kev Garcia is a 25 year old  with and IUP at 33w4d who presents with complaint of pelvic pressure and contractions. Was driving and felt like her contractions were picking up, feels like she has been hydrating. Worried about PTL as she has a high pain tolerance and never has had lemuel rebolledo contractions this early or consistent before. Feels like since presenting things have improved and it does not feel like labor contractions.    Patient states baby is active.  Denies ROM   Denies vaginal bleeding  Present OB History at [facility] for [facility] with the CNMs.     Patient Active Problem List   Diagnosis     Papanicolaou smear of cervix with atypical squamous cells of undetermined significance (ASC-US)     Supervision of normal intrauterine pregnancy in multigravida in third trimester     Pyelectasis of fetus on prenatal ultrasound     H/O cold sores     Labor and delivery, indication for care       ROS:  Patient is alert and oriented      PHYSICAL EXAM:  LMP 2020 (Approximate)     FHT's 135 with moderate variability  Accelerations: present   Decelerations:  absent        Contractions: Pt is lucero every 3-7 minutes, lasting 30-90 seconds and palpates mild, now spacing out to about 10 minutes    PELVIC EXAM:  Vulva: No external lesions, BUS WNL  Vagina: Moist, pink, discharge moderate,  well rugated, no lesions  Cervix: appears closed, smooth, pink, no visible lesions, neg CMT  Speculum exam, FFN, wet prep, gram stain/yeast culture collected, cervix appears long thick closed and posterior    Abdomen: gravid  Bloody show: no  Cervix: closed/ 30%/ Posterior/ average/ -1/ballotable with exam, vertex  Membranes are intact         ASSESSMENT :   25 year old  with [place] IUP 33w4d not in  labor  Dehydration-ketones 80  NST  reactive  GBS unknown      PLAN:  Discharge home  Continue routine prenatal care   Plan to see patient end  of next week in clinic for visit and recheck if needs reassurance prior to camping trip  Lengthy discussion about proper hydration about  oz of water per day, discussed ketones being 80 likely indicates she is very dehydrated.  Discussed that dehydration can cause contractions  May need to consider using support belt but also reviewed normal end of pregnancy discomforts/muscle discomforts in 3rd trimester with 4th baby  FFN/cultures/wet prep pending, will treat accordingly/as needed   Teaching done r/t to s/s of labor, SROM, decreased fetal movement, comfort measures in third trimester.  Instructed to please refer to the discharge handouts, the RN triage line or on-call CNM for any questions or concerns.  Pt verbalizes understanding and agreement with current plan of care.    55 minutes spent on the date of the encounter doing chart review, patient visit and documentation    PETER Manzo CNM

## 2021-05-28 NOTE — PLAN OF CARE
Pa is a 26yo  22w4d presents with cramping and lemuel rebolledo that started last evening in her LLQ and moved across her abdomen. Had a BM but symptoms didn't resolve. Denies LOF, bleeding or problems with this pregnancy.     Dionte-FAYE Nunez at bedside. Collected wet prep, FFN and SVE closed /30%/-1

## 2021-05-29 NOTE — RESULT ENCOUNTER NOTE
Informed via mychart, discussed positive results verus negative result indications.    PETER Shelby, CNM

## 2021-06-01 LAB
Lab: NORMAL
SPECIMEN SOURCE: NORMAL
YEAST SPEC QL CULT: NORMAL

## 2021-06-06 ENCOUNTER — TELEPHONE (OUTPATIENT)
Dept: OBGYN | Facility: CLINIC | Age: 26
End: 2021-06-06

## 2021-06-06 ENCOUNTER — HOSPITAL ENCOUNTER (OUTPATIENT)
Facility: CLINIC | Age: 26
Discharge: HOME | End: 2021-06-06
Attending: NURSE PRACTITIONER | Admitting: NURSE PRACTITIONER
Payer: COMMERCIAL

## 2021-06-06 ENCOUNTER — NURSE TRIAGE (OUTPATIENT)
Dept: NURSING | Facility: CLINIC | Age: 26
End: 2021-06-06

## 2021-06-06 VITALS
RESPIRATION RATE: 16 BRPM | HEIGHT: 60 IN | WEIGHT: 158 LBS | BODY MASS INDEX: 31.02 KG/M2 | SYSTOLIC BLOOD PRESSURE: 119 MMHG | TEMPERATURE: 97.9 F | DIASTOLIC BLOOD PRESSURE: 67 MMHG

## 2021-06-06 DIAGNOSIS — B96.89 BACTERIAL VAGINOSIS IN PREGNANCY: Primary | ICD-10-CM

## 2021-06-06 DIAGNOSIS — B37.31 YEAST VAGINITIS: Primary | ICD-10-CM

## 2021-06-06 DIAGNOSIS — O23.599 BACTERIAL VAGINOSIS IN PREGNANCY: Primary | ICD-10-CM

## 2021-06-06 PROBLEM — R87.89 POSITIVE FETAL FIBRONECTIN AT 22 WEEKS TO 34 WEEKS GESTATION: Status: ACTIVE | Noted: 2021-06-06

## 2021-06-06 PROBLEM — O09.899 POSITIVE FETAL FIBRONECTIN AT 22 WEEKS TO 34 WEEKS GESTATION: Status: ACTIVE | Noted: 2021-06-06

## 2021-06-06 LAB
ALBUMIN UR-MCNC: NEGATIVE MG/DL
APPEARANCE UR: CLEAR
BILIRUB UR QL STRIP: NEGATIVE
COLOR UR AUTO: ABNORMAL
GLUCOSE UR STRIP-MCNC: NEGATIVE MG/DL
GRAM STN SPEC: NORMAL
HGB UR QL STRIP: NEGATIVE
KETONES UR STRIP-MCNC: NEGATIVE MG/DL
LEUKOCYTE ESTERASE UR QL STRIP: ABNORMAL
Lab: NORMAL
MUCOUS THREADS #/AREA URNS LPF: PRESENT /LPF
NITRATE UR QL: NEGATIVE
PH UR STRIP: 7 PH (ref 5–7)
RBC #/AREA URNS AUTO: 1 /HPF (ref 0–2)
RUPTURE OF FETAL MEMBRANES BY ROM PLUS: NEGATIVE
SOURCE: ABNORMAL
SP GR UR STRIP: 1.01 (ref 1–1.03)
SPECIMEN SOURCE: ABNORMAL
SPECIMEN SOURCE: NORMAL
SQUAMOUS #/AREA URNS AUTO: 10 /HPF (ref 0–1)
UROBILINOGEN UR STRIP-MCNC: 0 MG/DL (ref 0–2)
WBC #/AREA URNS AUTO: 4 /HPF (ref 0–5)
WET PREP SPEC: ABNORMAL

## 2021-06-06 PROCEDURE — 87591 N.GONORRHOEAE DNA AMP PROB: CPT | Performed by: NURSE PRACTITIONER

## 2021-06-06 PROCEDURE — 84112 EVAL AMNIOTIC FLUID PROTEIN: CPT | Performed by: NURSE PRACTITIONER

## 2021-06-06 PROCEDURE — 87210 SMEAR WET MOUNT SALINE/INK: CPT | Performed by: NURSE PRACTITIONER

## 2021-06-06 PROCEDURE — 81001 URINALYSIS AUTO W/SCOPE: CPT | Performed by: NURSE PRACTITIONER

## 2021-06-06 PROCEDURE — 99215 OFFICE O/P EST HI 40 MIN: CPT | Mod: 25 | Performed by: NURSE PRACTITIONER

## 2021-06-06 PROCEDURE — 250N000013 HC RX MED GY IP 250 OP 250 PS 637: Performed by: NURSE PRACTITIONER

## 2021-06-06 PROCEDURE — 96372 THER/PROPH/DIAG INJ SC/IM: CPT

## 2021-06-06 PROCEDURE — 250N000011 HC RX IP 250 OP 636

## 2021-06-06 PROCEDURE — 59025 FETAL NON-STRESS TEST: CPT | Mod: 26 | Performed by: NURSE PRACTITIONER

## 2021-06-06 PROCEDURE — G0463 HOSPITAL OUTPT CLINIC VISIT: HCPCS | Mod: 25

## 2021-06-06 PROCEDURE — 87205 SMEAR GRAM STAIN: CPT | Performed by: NURSE PRACTITIONER

## 2021-06-06 PROCEDURE — 87491 CHLMYD TRACH DNA AMP PROBE: CPT | Performed by: NURSE PRACTITIONER

## 2021-06-06 PROCEDURE — 99417 PROLNG OP E/M EACH 15 MIN: CPT | Performed by: NURSE PRACTITIONER

## 2021-06-06 PROCEDURE — 59025 FETAL NON-STRESS TEST: CPT

## 2021-06-06 PROCEDURE — 87653 STREP B DNA AMP PROBE: CPT | Performed by: NURSE PRACTITIONER

## 2021-06-06 PROCEDURE — 87102 FUNGUS ISOLATION CULTURE: CPT | Performed by: NURSE PRACTITIONER

## 2021-06-06 RX ORDER — METRONIDAZOLE 500 MG/1
500 TABLET ORAL 2 TIMES DAILY
Qty: 13 TABLET | Refills: 0 | Status: SHIPPED | OUTPATIENT
Start: 2021-06-06 | End: 2021-06-13

## 2021-06-06 RX ORDER — METRONIDAZOLE 500 MG/1
500 TABLET ORAL 2 TIMES DAILY
Status: DISCONTINUED | OUTPATIENT
Start: 2021-06-06 | End: 2021-06-06

## 2021-06-06 RX ORDER — BETAMETHASONE SODIUM PHOSPHATE AND BETAMETHASONE ACETATE 3; 3 MG/ML; MG/ML
INJECTION, SUSPENSION INTRA-ARTICULAR; INTRALESIONAL; INTRAMUSCULAR; SOFT TISSUE
Status: COMPLETED
Start: 2021-06-06 | End: 2021-06-06

## 2021-06-06 RX ORDER — ONDANSETRON 2 MG/ML
4 INJECTION INTRAMUSCULAR; INTRAVENOUS EVERY 6 HOURS PRN
Status: DISCONTINUED | OUTPATIENT
Start: 2021-06-06 | End: 2021-06-06 | Stop reason: HOSPADM

## 2021-06-06 RX ORDER — BETAMETHASONE SODIUM PHOSPHATE AND BETAMETHASONE ACETATE 3; 3 MG/ML; MG/ML
12 INJECTION, SUSPENSION INTRA-ARTICULAR; INTRALESIONAL; INTRAMUSCULAR; SOFT TISSUE ONCE
Status: COMPLETED | OUTPATIENT
Start: 2021-06-06 | End: 2021-06-06

## 2021-06-06 RX ORDER — FLUCONAZOLE 150 MG/1
150 TABLET ORAL ONCE
Qty: 2 TABLET | Refills: 0 | Status: SHIPPED | OUTPATIENT
Start: 2021-06-06 | End: 2021-06-06

## 2021-06-06 RX ORDER — METRONIDAZOLE 500 MG/1
500 TABLET ORAL 2 TIMES DAILY
Status: COMPLETED | OUTPATIENT
Start: 2021-06-06 | End: 2021-06-06

## 2021-06-06 RX ADMIN — BETAMETHASONE SODIUM PHOSPHATE AND BETAMETHASONE ACETATE 12 MG: 3; 3 INJECTION, SUSPENSION INTRA-ARTICULAR; INTRALESIONAL; INTRAMUSCULAR; SOFT TISSUE at 13:45

## 2021-06-06 RX ADMIN — METRONIDAZOLE 500 MG: 500 TABLET, FILM COATED ORAL at 14:31

## 2021-06-06 RX ADMIN — BETAMETHASONE SODIUM PHOSPHATE AND BETAMETHASONE ACETATE 12 MG: 3; 3 INJECTION, SUSPENSION INTRA-ARTICULAR; INTRALESIONAL; INTRAMUSCULAR at 13:45

## 2021-06-06 ASSESSMENT — MIFFLIN-ST. JEOR: SCORE: 1378.18

## 2021-06-06 NOTE — PROGRESS NOTES
1105: Pt arrives to MAC for PTL eval. Monitors placed per Grecia ALEJANDRO RN.  1200: Ctx q3-5 min. Consent to obtain medical hx. Pt states she has had pain in her lower right abdomen since this morning, and it wraps around to her back. Pt states she also noticed uterine tightening/ctx starting about 1030. Consent to collect wet prep, ROM+, G&C, and UA and sent to lab per orders from Taina DOZIER CNM. SVE per orders. SVE 1-2/60/-1. Awaiting lab results. Reactive NST.  1315: Lab results relayed to Taina. Pt still feeling ctx. Ctx q4-5 min.  1345: Betamethasone given per orders from Taina DOZIER CNM. GBS collected and SVE per Taina. SVE unchanged. Pt still having regular, painful ctx. Will continue to monitor, and recheck SVE at 1530.  1500: Report to Grecia ALEJANDRO RN, who will assume cares.

## 2021-06-06 NOTE — DISCHARGE INSTRUCTIONS
Discharge Instruction for Undelivered Patients      You were seen for: discomfort/contractions  We Consulted: Taina DOZIER CNM  You had (Test or Medicine):NST, labs     Diet:   Drink 8 to 12 glasses of liquids (milk, juice, water) every day.  You may eat meals and snacks.     Activity:  Call your doctor or nurse midwife if your baby is moving less than usual.     Call your provider if you notice:  Swelling in your face or increased swelling in your hands or legs.  Headaches that are not relieved by Tylenol (acetaminophen).  Changes in your vision (blurring: seeing spots or stars.)  Nausea (sick to your stomach) and vomiting (throwing up).   Weight gain of 5 pounds or more per week.  Heartburn that doesn't go away.  Signs of bladder infection: pain when you urinate (use the toilet), need to go more often and more urgently.  The bag of ramos (rupture of membranes) breaks, or you notice leaking in your underwear.  Bright red blood in your underwear.  Abdominal (lower belly) or stomach pain.  For first baby: Contractions (tightening) less than 5 minutes apart for one hour or more.  Second (plus) baby: Contractions (tightening) less than 10 minutes apart and getting stronger.  *If less than 34 weeks: Contractions (tightenings) more than 6 times in one hour.  Increase or change in vaginal discharge (note the color and amount)    Follow-up:  6/7/21 at [facility]

## 2021-06-06 NOTE — PLAN OF CARE
1600- Taina BURDICKM at bedside to evaluate pt.  Cervix recheck, see flowsheet.   Pt states her pain has decreasde some.  Category 1 tracing and Ctx 2-5 min.  Plan to discharge pt to home, pt will follow up back in the MAC tomorrow at 1330 for second dose of betamethasone.  Pt to  prescription on her way home per order.  1640- Monitors removed and pt discontinue to home, instructed to call CNM if pain increases, vaginal bleeding or leaking of fluid.  Pt voiced understanding.   Pt given discharge instructions, all questions answered.  Home undelivered, no further questions voiced and pt left ambulatory.

## 2021-06-06 NOTE — TELEPHONE ENCOUNTER
"Pt 34 wks6d pregnant and at 2:00 am pt developed sharp pain lower right abdomen - this happened twice before.  Now the pain is consistent and it comes and goes.  Pain \"7-8\" on 0/10 pain for the past one hour.  Baby is moving but for the past hour as not active. Taina Carlson On Call CNM paged to pt at 10:19 am.  Mery Kennedy RN, MA  [facility]      Reason for Disposition    MODERATE-SEVERE abdominal pain (e.g., interferes with normal activities, awakens from sleep)    Additional Information    Negative: [1] Vomiting AND [2] contains red blood or black (\"coffee ground\") material  (Exception: few red streaks in vomit that only happened once)    Protocols used: PREGNANCY - ABDOMINAL PAIN GREATER THAN 20 WEEKS EGA-A-AH      "

## 2021-06-06 NOTE — PROGRESS NOTES
"MATERNAL ASSESSMENT CENTER CNM TRIAGE NOTE    Pa Kev Garcia is a 25 year old  with and IUP at 34w6d who presents with complaint of abdominal pain.     Called with occasional severe abdominal pain. Has trouble describing sensation, but states it feels as if \"you had a gunshot wound and someone was sticking their finger in it\". Mostly in RLQ, radiating laterally across her abdomen, lasting a few seconds. Wakes her from sleep and takes her breath away.   This particular pain has not occurred since leaving home for evaluation. Upon arrival, regular contractions on TOCO every 2.5-5 min. Reports backache, abdominal tightening, and pelvic pressure but states that the contractions are not painful. Repositioning frequently without relief of back pain. Completed 7 day course of metronidazole yesterday.  States had intercourse last night.      Patient states baby is active.  Denies ROM/ leaking fluid   Denies vaginal bleeding  [facility].     Problems this pregnancy:   1. Bacterial vaginosis  2. Positive FFN 21  3. Hx oral HSV  4. Fetal bilateral pyelectasis    ROS:  Patient is alert and oriented  Abdominal pain and back pain with contractions    PHYSICAL EXAM:  /67   Temp 97.9  F (36.6  C) (Temporal)   Resp 16   Ht 1.524 m (5')   Wt 71.7 kg (158 lb)   LMP 2020 (Approximate)   BMI 30.86 kg/m      FHT's 145 with moderate variability  Accelerations: present   Decelerations:  absent        Contractions: Pt is lucero every 3-5 minutes, lasting  seconds and palpates moderate   1545: contractions every 3-5 minutes, lasting 60 seconds, pt reports some stronger than others    Abdomen: gravid, soft between contractions, non-tender to palpation.  Bloody show: no  Cervix:   1200: 1-2/ 50-60%/ posterior/ average/ -1, membranes are intact   1350: 1-2/50-60%/posterior/average/-1, membranes intact  1545: 2/50-60%/posterior/average/-1, membranes " intact    Color Urine (no units)   Date Value   2021 Light Yellow     Appearance Urine (no units)   Date Value   2021 Clear     Glucose Urine (mg/dL)   Date Value   2021 Negative     Bilirubin Urine (no units)   Date Value   2021 Negative     Ketones Urine (mg/dL)   Date Value   2021 Negative     Specific Gravity Urine (no units)   Date Value   2021 1.012     pH Urine (pH)   Date Value   2021 7.0     Protein Albumin Urine (mg/dL)   Date Value   2021 Negative     Urobilinogen Urine (EU/dL)   Date Value   2020 0.2     Nitrite Urine (no units)   Date Value   2021 Negative     Leukocyte Esterase Urine (no units)   Date Value   2021 Small (A)     Microscopic wet-mount exam shows clue cells.  ROM plus negative.  GBS pending.  GC/CT, gram stain, yeast culture pending.    ASSESSMENT:   25 year old  with crowley IUP 34w6d not in labor  NST reactive.  GBS unknown and membranes intact.  +Bacterial vaginosis    PLAN:  Discharge home  Continue routine prenatal care  Will continue oral metronidazole for 7 days, 1st dose given today in [facility].   Counseled on medication MOA, SE, importance of finishing entire course, abstain from vaginal intercourse or use condom while being treated.  Will plan for LELO 1 week after course completed.   Betamethasone 12mg IM given. Return for 2nd dose 21 @ 1345    Teaching done r/t to s/s of labor, SROM, decreased fetal movement, comfort measures in third trimester. Plan for CNM to check-in by phone this evening. Encouraged rest, hydration, bath with epsom salt. Pa will have  through the night and knows to call with any change or if unable to sleep. Instructed to please refer to the discharge handouts, the RN triage line or on-call CNM for any questions or concerns. Will either call clinic number or unit directly depending on severity of symptoms. Pt verbalizes understanding and agreement with current plan of  care.    ANGELIQUE Solis  [facility]    I, Marielena Vanegas, am serving as a scribe; to document services personally performed by Emma GREEN CNM- based on data collection and the provider's statements to me.    Provider Disclosure:  I was present with the student who participated in the service and in the documentation of the note. I have verified the history and personally performed the physical exam and medical decision-making. I agree with the assessment and plan of care as documented in the note.    Taina GREEN CNM, [facility]  [phone]       100 minutes spent on the date of the encounter doing chart review, patient visit and documentation

## 2021-06-06 NOTE — TELEPHONE ENCOUNTER
Received page to call Pa re: abd pain.  Pa states that she started to have sharp RLQ at 0200.  She states that it was intermittent and eventually resolved.  At 0840 this am, she states that the RLQ pain returned, states that it is sharp, constant, rated 7-8/10.  She states that she feels her abd tightening, but that tightening is not constant or frequent.  She states that this pain feels similar to the abd pain that she had last week when she was + for BV, but has increased in intensity and is not resolving.  She states that nothing is resolving pain.  +fetal movement, denies vaginal bleeding, loss of fluid.      Discussed that she will need to go to [facility] for evaluation, I will plan to evaluate her once she arrives.   Agrees to POC, will have someone come care for other children then will come to [facility] for eval.    MAC called and report given.     Taina GREEN CNM, [facility]  [phone]

## 2021-06-07 ENCOUNTER — TELEPHONE (OUTPATIENT)
Dept: MIDWIFE SERVICES | Facility: CLINIC | Age: 26
End: 2021-06-07

## 2021-06-07 ENCOUNTER — HOSPITAL ENCOUNTER (OUTPATIENT)
Facility: CLINIC | Age: 26
Discharge: HOME | End: 2021-06-07
Attending: ADVANCED PRACTICE MIDWIFE | Admitting: ADVANCED PRACTICE MIDWIFE
Payer: COMMERCIAL

## 2021-06-07 ENCOUNTER — HOSPITAL ENCOUNTER (OUTPATIENT)
Facility: CLINIC | Age: 26
End: 2021-06-07
Admitting: ADVANCED PRACTICE MIDWIFE
Payer: COMMERCIAL

## 2021-06-07 VITALS — DIASTOLIC BLOOD PRESSURE: 59 MMHG | SYSTOLIC BLOOD PRESSURE: 106 MMHG | RESPIRATION RATE: 16 BRPM | TEMPERATURE: 97.5 F

## 2021-06-07 LAB
C TRACH DNA SPEC QL NAA+PROBE: NEGATIVE
GP B STREP DNA SPEC QL NAA+PROBE: POSITIVE
N GONORRHOEA DNA SPEC QL NAA+PROBE: NEGATIVE
SPECIMEN SOURCE: ABNORMAL
SPECIMEN SOURCE: NORMAL
SPECIMEN SOURCE: NORMAL

## 2021-06-07 PROCEDURE — 250N000011 HC RX IP 250 OP 636

## 2021-06-07 PROCEDURE — 96372 THER/PROPH/DIAG INJ SC/IM: CPT

## 2021-06-07 PROCEDURE — 59025 FETAL NON-STRESS TEST: CPT

## 2021-06-07 PROCEDURE — G0463 HOSPITAL OUTPT CLINIC VISIT: HCPCS | Mod: 25

## 2021-06-07 RX ORDER — BETAMETHASONE SODIUM PHOSPHATE AND BETAMETHASONE ACETATE 3; 3 MG/ML; MG/ML
12 INJECTION, SUSPENSION INTRA-ARTICULAR; INTRALESIONAL; INTRAMUSCULAR; SOFT TISSUE EVERY 24 HOURS
Status: DISCONTINUED | OUTPATIENT
Start: 2021-06-07 | End: 2021-06-07 | Stop reason: HOSPADM

## 2021-06-07 RX ORDER — BETAMETHASONE SODIUM PHOSPHATE AND BETAMETHASONE ACETATE 3; 3 MG/ML; MG/ML
INJECTION, SUSPENSION INTRA-ARTICULAR; INTRALESIONAL; INTRAMUSCULAR; SOFT TISSUE
Status: COMPLETED
Start: 2021-06-07 | End: 2021-06-07

## 2021-06-07 RX ORDER — ONDANSETRON 2 MG/ML
4 INJECTION INTRAMUSCULAR; INTRAVENOUS EVERY 6 HOURS PRN
Status: DISCONTINUED | OUTPATIENT
Start: 2021-06-07 | End: 2021-06-07 | Stop reason: HOSPADM

## 2021-06-07 RX ADMIN — BETAMETHASONE SODIUM PHOSPHATE AND BETAMETHASONE ACETATE 12 MG: 3; 3 INJECTION, SUSPENSION INTRA-ARTICULAR; INTRALESIONAL; INTRAMUSCULAR at 13:15

## 2021-06-07 RX ADMIN — BETAMETHASONE SODIUM PHOSPHATE AND BETAMETHASONE ACETATE 12 MG: 3; 3 INJECTION, SUSPENSION INTRA-ARTICULAR; INTRALESIONAL; INTRAMUSCULAR; SOFT TISSUE at 13:15

## 2021-06-07 ASSESSMENT — ACTIVITIES OF DAILY LIVING (ADL): TOILETING_ISSUES: NO

## 2021-06-07 NOTE — TELEPHONE ENCOUNTER
"Called and talked with Pa.  She states that since leaving the hospital, she feels like contractions are \"a little farther apart\", states that she is still feeling some pelvic pressure and intermittent back pain with some contractions, not all.  She states that she was able to sleep for 2 hours without waking up.  Denies loss of fluid, vaginal bleeding, +fetal movement.  She states that she has been resting and drinking water since getting home.  She was not able to  her prescription for Flagyl tonight and plans to pick it up tomorrow am.  Is also asking for prescription to prevent yeast infection.     Counseled to call on call number or call [facility] unit directly if contractions become more frequent, more intense, feeling rectal pressure/urge to push with contractions, or any loss of fluid, vaginal bleeding, decrease in fetal movement.  [facility] unit number given, has afterhours number to call me back if she needs to.  Will send in prescription for diflucan for yeast prophylaxis; will  tomorrow with Flagyl.  Instructed to take 1 diflucan on day 2 or 3 of Flagyl and may repeat 2nd dose 3 days after, or she can wait until she feels like she has signs and symptoms yeast infection to take the medication.      Counseled on danger signs, signs and symptoms progressing PTL, fetal movement counts, signs and symptoms preeclampsia, when to call.     Verbalized understanding, all questions answered.      Will plan for myself or on call CNM to call tomorrow am.    Taina GREEN CNM, [facility]  [phone]    "

## 2021-06-07 NOTE — CONFIDENTIAL NOTE
"Called to check in with Pa. Was in [facility] yesterday for PTL evaluation, had previous positive FFN on 5/28/21. Cx 2cm/50-60%/-1/posterior per CNM exam yesterday in [facility], received first dose of betamethasone. Was found to be positive for BV again, repeat rx for flagyl and Diflucan sent. Pa was ultimately discharged to home after approx 4 hours of monitoring in [facility].     This morning Pa states that she is \"not having consistent contractions or lemuel rebolledo like I was yesterday and I'm not having any pressure.\" She picked up her Rx's for Flagyl and Diflucan this morning and is taking as prescribed. She was able to get some rest over night. Denies leaking of fluid and vaginal bleeding. Reports +FM but states baby is quieter this morning, reports she can feel \"rolls and nudges.\" Discussed fetal kick counts and fetal sleep cycles. Discussed that rolls and nudges count as movement and especially at 35 weeks movement may be more subtle, however still expect 10 movements in 2 hours if lying quietly and performing formal kick count assessment. If she is not getting expectant movement then she should present to MAC for evaluation. She states understanding of teachings.     Has 2nd dose of betamethasone at MAC at 1330. Discussed that we do not need to perform cervical assessment if she is feeling as she does now, however if contractions increase in intensity or return in a consistent pattern then we do need to assess. Additionally, would perform NST if she reports decreased fetal movement. Pa is in agreement with plan. Has routine prenatal appt scheduled for Thursday. Encouraged to call with any additional questions or concerns.     Shannon Knutson, PETER, CNM    "

## 2021-06-07 NOTE — PLAN OF CARE
Pa is a 24yo  presents for 2nd betamethasone injection for PTL with dilation. Pt states she has not felt the baby move much today. Monitors placed. Denies LOF, bleeding.     Sesar-Shannon DÍAZ CNM updated and agrees with POC. Plan to discharge after NST and beta.     Discharge instructions reviewed with pt. States understanding. Discharged to home ambulatory.

## 2021-06-07 NOTE — DISCHARGE INSTRUCTIONS
Discharge Instruction for Undelivered Patients      You were seen for: Fetal Assessment and Betamethasone injection  We Consulted: Shannon Knutson  You had (Test or Medicine):Fetal and uterine monitoring, betamethasone injection     Diet:   Drink 8 to 12 glasses of liquids (milk, juice, water) every day.  You may eat meals and snacks.     Activity:  Call your doctor or nurse midwife if your baby is moving less than usual.     Call your provider if you notice:  Swelling in your face or increased swelling in your hands or legs.  Headaches that are not relieved by Tylenol (acetaminophen).  Changes in your vision (blurring: seeing spots or stars.)  Nausea (sick to your stomach) and vomiting (throwing up).   Weight gain of 5 pounds or more per week.  Heartburn that doesn't go away.  Signs of bladder infection: pain when you urinate (use the toilet), need to go more often and more urgently.  The bag of ramos (rupture of membranes) breaks, or you notice leaking in your underwear.  Bright red blood in your underwear.  Abdominal (lower belly) or stomach pain.  Second (plus) baby: Contractions (tightening) less than 10 minutes apart and getting stronger.  Increase or change in vaginal discharge (note the color and amount)      Follow-up:  As scheduled in the clinic

## 2021-06-10 ENCOUNTER — PRENATAL OFFICE VISIT (OUTPATIENT)
Dept: MIDWIFE SERVICES | Facility: CLINIC | Age: 26
End: 2021-06-10
Payer: COMMERCIAL

## 2021-06-10 VITALS — BODY MASS INDEX: 30.62 KG/M2 | SYSTOLIC BLOOD PRESSURE: 100 MMHG | WEIGHT: 156.8 LBS | DIASTOLIC BLOOD PRESSURE: 54 MMHG

## 2021-06-10 DIAGNOSIS — Z34.83 SUPERVISION OF NORMAL INTRAUTERINE PREGNANCY IN MULTIGRAVIDA IN THIRD TRIMESTER: Primary | ICD-10-CM

## 2021-06-10 DIAGNOSIS — O09.899 POSITIVE FETAL FIBRONECTIN AT 22 WEEKS TO 34 WEEKS GESTATION: ICD-10-CM

## 2021-06-10 DIAGNOSIS — R87.89 POSITIVE FETAL FIBRONECTIN AT 22 WEEKS TO 34 WEEKS GESTATION: ICD-10-CM

## 2021-06-10 LAB
Lab: NORMAL
SPECIMEN SOURCE: NORMAL
YEAST SPEC QL CULT: NORMAL

## 2021-06-10 PROCEDURE — 99207 PR PRENATAL VISIT: CPT | Performed by: ADVANCED PRACTICE MIDWIFE

## 2021-06-10 RX ORDER — FLUCONAZOLE 150 MG/1
TABLET ORAL
COMMUNITY
Start: 2021-06-07 | End: 2021-06-21

## 2021-06-10 NOTE — PROGRESS NOTES
Feels well overall. Feeling more lemuel rebolledo when driving.   Fetal Movement: positive, denies loss of fluid/vb, a little  contractions  Fetal kick counts/movement reviewed  PUPPPs rash located on lowed central abdomen (distinct red papules circuling about 4 inches in diameter). Itching started last week. Just noticed rash last night.   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 Completely done in September 2022.    Reviewed PTL precautions and s/sx of preeclampsia; denies any S&S and aware of what to report    Discussed GBS+ status. Labors are quick. Discussed probably having to stay in the hospital for 48 hours     Have car seat Yes  Discussed GBS/cx check at next visit  Return to clinic 2 weeks    Elisabeth Lozano, NABILA, APRN, CNM

## 2021-06-10 NOTE — PATIENT INSTRUCTIONS
SIGNS OF  LABOR    Labor is  if it happens more than three weeks before your due date.    It can be hard to know if you are in labor, since the symptoms can be like the normal feelings of pregnancy.  Often, the only difference is the symptoms increase or they don't go away.     Signs of  labor can include:      Contractions which can feel like period cramps or gas pain.  You may feel it in the lower part of your abdomen, in your back, or as a pressure feeling in your bottom.  It is often regular, coming every 5 or 10 minutes, and  lasting about 30-60 seconds. Some contractions are normal during pregnancy (Whitley rebolledo contractions) but if you are feeling more than 5-6 in one hour that is NOT normal    If this occurs empty your bladder, then drink 2-3 glasses of water, eat a snack, and lay down on your left side. Put your hand on your abdomen to count the contractions.  If after one hour of resting you have still had 5-6 contractions call your clinic right away.      If you feel a pop, gush, or trickle of fluid it may mean that your bag of water has broken and you should contact the clinic       You may also experience loose stools and/or rectal pressure       Listen to your body, if something doesn't seem right please call us at the clinic    Risk Factors      Previous  delivery    Bacterial Vaginosis- if you notice a fishy smell to your discharge or experience vaginal itching/discomfort you should be evaluated for infection    Smoking    Drug abuse    Adolescent (teen) pregnancy or advanced maternal age (AMA) age 35 and over    Dehydration (this may not cause  labor but it can cause contractions)    If you think you are in  labor we may do some lab testing in the clinic or send you to the hospital for evaluation    Please call us if you are concerned you are in  labor.    [facility]  [phone]      PREECLAMPSIA SIGNS AND SYMPTOMS    Preeclampsia is  "a dangerous condition that some women develop in the second half of pregnancy. It can also begin after the baby is born.  Preeclampsia causes high blood pressure and can cause problems with many organ systems in your body.  It can also affect the growth of your baby. The exact cause of preeclampsia is unknown, however, there are signs and symptoms to watch for:    -A bad headache that doesn't improve with Tylenol  -Visual changes such as spots, flashes of light, blurry vision  -Pain in the upper right part of your abdomen, especially under the ribs that doesn't go away  -Nausea and/or vomiting  -Feeling extremely tired  -Yellowing of the skin and/or eyes  -Feeling \"not quite right\" or that something is wrong  -An extreme amount of swelling (some swelling in pregnancy is very normal)    If your midwife feels that you are developing preeclampsia, you will have lab tests drawn and will be monitored very closely.     If you are experiencing anyof these symptoms, call the [facility] immediately at [phone].    GROUP B STREP    Group B Strep (GBS) is a common bacteria that is sometimes found in the vagina, urinary tract or rectum.  It is not harmful typically to adults but can cause serious illness in newborns.  It occasionally is passed from mother to baby during birth.   It is important to test in pregnancy.  When a woman is found to be positive for GBS, either at the first prenatal visit or by taking a culture at 36 weeks, treatment will be offered to reduce the chance of spreading the bacteria to the baby.       Treatment consists of either oral antibiotics early in pregnancy or antibiotics given by IV during labor if testing is positive at 36 weeks.      Even without treatment the baby rarely (1-2% of the time) gets infected.  With treatment the baby almost never gets infected.       There really isn't anything you can do to keep from getting or being positive for GBS.  It isn't sexually " transmitted and there are no symptoms if you are positive.       Your midwife will discuss your results with you and make recommendations for treatment.                                                   Infant Car Seat Safety   [place] law requires that all infants must be secured into a car seat while in a car.    General Car Seat Guidelines:            Your car seat should be no more than six years old and should never have been in an accident.  Always know the history of your car seat.            Car seats should be installed in the back seat and should be rear-facing. The American Academy of Pediatrics recommends that all infants and toddlers should remain rear-facing for as long as possible, until they reach the weight or height limits allowed by their car seat. Follow your car seat 's instructions.            The straps on the car seat should be snug enough that you should not be able to pinch up slack with your fingers.            Never dress your baby in thick clothing, coats or blankets while riding in a car seat.  Secure the baby into the seat wearing clothing of normal thickness and then cover baby s lap with a blanket OVER the car seat straps, if needed.  Car Seat Clinics and Safety Information:            Schedule an appointment to have a professional check the installation of your car seat before your baby is born.    Go to: https://[place]ps.[place].gov/divisions/ots/child-passenger-safety/Pages/car-seat-checks.aspx         and click on your county.            Sign up for email alerts on child restraint/car seat recalls at:     http://www-crystal.nhtsa.dot.gov/subscriptions/index.cfm             Find more information on car seat safety at:     http://www.safercar.gov/parents/CarSeats/Car-Seat-Safety.htm    Fetal Kick Counts    It is important to know when your baby's movements occur. We often get busy with work and life and do not pay close attention to their movements.        Women typically begin  feeling movement between 18-22 weeks of gestation, sometimes it can be earlier or later depending on where your placenta is       Movements usually begin feeling like popping or fluttering and as the baby grows they become more pronounced    Toward the end of pregnancy as the baby gets larger they may not move as much or make as big of movements. Babies have maturing sleep cycles as well as not as much room to move and flip. If you are ever concerned about your baby's movements or have not felt the baby move for a while, we recommend you do a fetal kick count. Prior to starting your count drink a glass of water or juice and eat a snack. Then lay down on your side and begin to count movements.     How to do a Fetal Kick Counts    There are many different ways to monitor your baby's movements. Movements can range from large jabs to small kicks, or wiggles.  Hiccups count!      Count 10 movements in 2 hours when resting and focusing    Count 10 movements in 12 hours when doing normal activity    We recommend that if movements occur but seem decreased that you should be seen in the clinic or hospital for evaluation within 12 hours. If fetal movement is absent or fetal kick counts are low please contact us right away.    If you ever have any concerns about your baby's movements DO NOT HESITATE to call us, we are here for you!    [facility]  [phone]

## 2021-06-23 ENCOUNTER — VIRTUAL VISIT (OUTPATIENT)
Dept: MIDWIFE SERVICES | Facility: CLINIC | Age: 26
End: 2021-06-23
Payer: COMMERCIAL

## 2021-06-23 ENCOUNTER — MYC MEDICAL ADVICE (OUTPATIENT)
Dept: MIDWIFE SERVICES | Facility: CLINIC | Age: 26
End: 2021-06-23

## 2021-06-23 DIAGNOSIS — R10.9 ABDOMINAL PAIN AFFECTING PREGNANCY, ANTEPARTUM: ICD-10-CM

## 2021-06-23 DIAGNOSIS — R05.9 COUGH: ICD-10-CM

## 2021-06-23 DIAGNOSIS — Z34.83 SUPERVISION OF NORMAL INTRAUTERINE PREGNANCY IN MULTIGRAVIDA IN THIRD TRIMESTER: Primary | ICD-10-CM

## 2021-06-23 DIAGNOSIS — O26.899 ABDOMINAL PAIN AFFECTING PREGNANCY, ANTEPARTUM: ICD-10-CM

## 2021-06-23 DIAGNOSIS — R09.81 NASAL CONGESTION: ICD-10-CM

## 2021-06-23 PROCEDURE — 99207 PR PRENATAL VISIT: CPT | Performed by: NURSE PRACTITIONER

## 2021-06-23 ASSESSMENT — PATIENT HEALTH QUESTIONNAIRE - PHQ9: SUM OF ALL RESPONSES TO PHQ QUESTIONS 1-9: 0

## 2021-06-23 NOTE — TELEPHONE ENCOUNTER
Routing mychart msg to Midwife for review/recommendation.    Daphnie Chin RN on 6/23/2021 at 4:18 PM

## 2021-06-23 NOTE — TELEPHONE ENCOUNTER
Responded to patient directly via MyChart.     Taina GREEN CNM, [facility]  [phone]

## 2021-06-23 NOTE — PATIENT INSTRUCTIONS
Over-the-counter (OTC) medications during pregnancy    Make sure to follow package directions for dosing and information unless otherwise noted on the list.    Morning sickness/nausea:      Unisom (doxylamine) 12.5 mg (1/2 tab) and Vitamin B6 25-50 mg three times daily. Unisom may cause some drowsiness as it is typically used for sleep. The combination of these medications can be very effective but they can also be taken separately    Dramamine (Dimenhydrinate) 25-50 mg every 4-6 hours as needed    Benadryl (diphenhydramine) 25-50 mg every 4-6 hours     Ginger tablets 1000 mg per day in divided doses    Constipation:      Colace (Docusate sodium)    Metamucil    Citrucel    Milk of magnesia    Fibercon    Miralax (if all other methods have failed)    Diarrhea:    Imodium (loperamide)    Heartburn:      Zantac (ranitidine)    Pepcid (famotidine)    Prilosec (omeprazole)    Antacids-Tums, Maalox (liquid or tablets), Rolaids  Pepto Bismol and Linda Leeton are NOT RECOMMENDED for use during pregnancy because they contain aspirin    Hemorrhoids:      Tucks pads/ointment    Anusol/Anusol-HC    Preparation H    Gas Pain  Simethicone (Gas-X, Mylanta Gas, Mylicon)      Cough, cold, and congestion:      Robitussin (dextromethorphan) and Robitussin DM (dextromethorphan and guaifenesin)    Cough drops/zinc lozenges    Mucinex    Sudafed (after 16 weeks gestation)    Vicks Vapo rub  Cough medicine with alcohol like Nyquil is not recommended during pregnancy    Headache:      Acetaminophen (Tylenol) 650 mg every 4-6 hours or 1000 mg every 6 hours, do not exceed 4000 mg in 24 hours   Ibuprofen (Advil/Motrin) and Naproxen (Aleve) are not recommended during the 1st or 3rd trimester of pregnancy    Allergy:      Benadryl (diphenhydramine)    Zyrtec (cetirizine)    Claritin (loratadine)    Rash/Itching:      Benadryl lotion    Cortaid cream (Hydrocortisone cream)    Benadryl (diphenhydramine)    Vaginal yeast infection:      Monistat  "3 or 7 day    Gyne-Lotrimin    Acne:      Benzoyl Peroxide    Salicylic acid     If you have questions or concerns about any medications that are available OTC or you are unsure if something is safe call:    [facility]  [phone]      PREECLAMPSIA SIGNS AND SYMPTOMS    Preeclampsia is a dangerous condition that some women develop in the second half of pregnancy. It can also begin after the baby is born.  Preeclampsia causes high blood pressure and can cause problems with many organ systems in your body.  It can also affect the growth of your baby. The exact cause of preeclampsia is unknown, however, there are signs and symptoms to watch for:    -A bad headache that doesn't improve with Tylenol  -Visual changes such as spots, flashes of light, blurry vision  -Pain in the upper right part of your abdomen, especially under the ribs that doesn't go away  -Nausea and/or vomiting  -Feeling extremely tired  -Yellowing of the skin and/or eyes  -Feeling \"not quite right\" or that something is wrong  -An extreme amount of swelling (some swelling in pregnancy is very normal)    If your midwife feels that you are developing preeclampsia, you will have lab tests drawn and will be monitored very closely.     If you are experiencing anyof these symptoms, call the [facility] immediately at [phone].  Fetal Kick Counts    It is important to know when your baby's movements occur. We often get busy with work and life and do not pay close attention to their movements.        Women typically begin feeling movement between 18-22 weeks of gestation, sometimes it can be earlier or later depending on where your placenta is       Movements usually begin feeling like popping or fluttering and as the baby grows they become more pronounced    Toward the end of pregnancy as the baby gets larger they may not move as much or make as big of movements. Babies have maturing sleep cycles as " "well as not as much room to move and flip. If you are ever concerned about your baby's movements or have not felt the baby move for a while, we recommend you do a fetal kick count. Prior to starting your count drink a glass of water or juice and eat a snack. Then lay down on your side and begin to count movements.     How to do a Fetal Kick Counts    There are many different ways to monitor your baby's movements. Movements can range from large jabs to small kicks, or wiggles.  Hiccups count!      Count 10 movements in 2 hours when resting and focusing    Count 10 movements in 12 hours when doing normal activity    We recommend that if movements occur but seem decreased that you should be seen in the clinic or hospital for evaluation within 12 hours. If fetal movement is absent or fetal kick counts are low please contact us right away.    If you ever have any concerns about your baby's movements DO NOT HESITATE to call us, we are here for you!    [facility]  [phone]      Labor Instructions for Midwife Patients    When to call:  Both during and after office hours call  [phone]. There is a triage RN to take your calls and answer your questions 24 hours a day.  If they cannot answer your question they will page the midwife on call for you.    When to call:  Call anytime you have important concerns about you or your baby.     Call if:    You are having contractions at regular intervals about 5-6 minutes apart lasting 30-60 seconds and becoming increasingly more intense     You have an uncontrollable gush of fluid from your vagina or feel a pop and gush like your water has broken    You have HEAVY bleeding, like heavy period, blood running down your legs, or  soaking a pad.     Some bleeding after a pelvic exam, after intercourse, or in labor when your cervix is dilating is normal and is referred to as \"bloody show\"    You have severe, continuous back or abdominal pain    You feel it is " time to go to the hospital    If this is your first labor, call when contractions are very intense and have been about every 3-4 minutes for about an hour    If it is your second labor or more, call when contractions are strong and about every 3-5 minutes or sooner depending on your level of discomfort.     Keep in mind we are always here for you! If you have questions, concerns please don't hesitate to call us.     What to eat/drink in labor: Drink plenty of fluid (water most importantly, juice, soda or tea without caffeine). Eat rice, pasta, soup, cereal, bread/toast, and fruit. Avoid dairy and greasy food as they are difficult to digest and you may experience some nausea during labor.    Comfort measures:    Baths and showers (ok even with ruptured membranes, it may temporarily slow contractions if you are still in the early stage of labor)    Warm/hot packs for back pain or discomfort    Back, belly, or thigh massages    Standing, rocking, walking, leaning over bed or tables, side-lying and sleeping    Miscellaneous:     Contractions are timed from the beginning of one to the beginning of the next    Try hard to sleep during the early stage of labor when you are not that uncomfortable. Timing of contractions at this point is not important    Even if you cannot sleep, resting in bed or on the couch can help you maintain your energy for labor    When you arrive at the hospital the nurse will check your baby's heartbeat, check your cervix, and will call us. The midwife on call will come in and be with you when you are in active labor    After hours you need to enter the hospital through the emergency room

## 2021-06-23 NOTE — PROGRESS NOTES
Kd Angulo Jose is a 25 year old female who is being evaluated via a billable telephone visit.      What phone number would you like to be contacted at? [phone]  How would you like to obtain your AVS? Rakan Yi states that she has been having nasal congestion and cough for the past 5-6 days.  She was tested for Covid at signs and symptoms onset and was negative.  She states that over the past few days she feels like nasal congestion has improved, but she states that she still has a cough with some chest congestion.  Denies productive cough, fever, chills, chest pain/tightness, SOB, dyspnea.  Is asking about what OTC medications she can take.   Pa also states that she is still having intermittent sharp periumbilical/right sided abd pain.  She states that pain occurs suddenly, is unsure what triggers it.  She states that sometimes pain is so severe, she has to curl up in a ball until it passes.  She states that she is also unsure what relieves the pain, but that pain resolves on its own in <30-60 minutes. She states that she is concerned that pain could be related to appendicitis.    Fetal movement: positive  Denies vaginal bleeding/lof, some contractions that resolve with rest  -Warning signs reviewed  -Will send safe OTC medications via [employer]  -Offered to come to [facility] or ER for evaluation of abd pain, declines at this time as it has resolved.  Encouraged to call clinic/on call CNM with any increase in pain or if abd pain returns, counseled that work up will need to be done including labs, US and fetal monitoring in order to assess possible cause of abd pain.  Reassurance provided that abd pain is likely not appendicitis because the pain resolves on its own.  If it was appendicitis, pain would be sharp, severe, unresolving, and accompanied by fever or other signs and symptoms of infection.  -counseled on safe medications for congestion/URI for pregnancy.  Counseled that if congestion increases/worsens,  develop productive cough, develop fever, have chest pain/tightness, have any SOB/dyspnea, she will need to be evaluated by urgent care, ER, or PCP  -Labor signs and symptoms discussed, aware of numbers to call  -Denies s/sx of preeclampsia and aware of what to report  -Return to clinic 1 week for in person prenatal visit  -Encouraged to call sooner with any questions/concerns.     Phone call duration: 15 minutes    [facility]

## 2021-06-25 ENCOUNTER — APPOINTMENT (OUTPATIENT)
Dept: ULTRASOUND IMAGING | Facility: CLINIC | Age: 26
End: 2021-06-25
Attending: NURSE PRACTITIONER
Payer: COMMERCIAL

## 2021-06-25 ENCOUNTER — HOSPITAL ENCOUNTER (OUTPATIENT)
Facility: CLINIC | Age: 26
Discharge: HOME | End: 2021-06-25
Attending: NURSE PRACTITIONER | Admitting: NURSE PRACTITIONER
Payer: COMMERCIAL

## 2021-06-25 ENCOUNTER — TELEPHONE (OUTPATIENT)
Dept: OBGYN | Facility: CLINIC | Age: 26
End: 2021-06-25

## 2021-06-25 DIAGNOSIS — O26.893 PELVIC PAIN IN PREGNANCY, ANTEPARTUM, THIRD TRIMESTER: Primary | ICD-10-CM

## 2021-06-25 DIAGNOSIS — R10.2 PELVIC PAIN IN PREGNANCY, ANTEPARTUM, THIRD TRIMESTER: Primary | ICD-10-CM

## 2021-06-25 DIAGNOSIS — K59.00 CONSTIPATION DURING PREGNANCY IN THIRD TRIMESTER: Primary | ICD-10-CM

## 2021-06-25 DIAGNOSIS — O99.613 CONSTIPATION DURING PREGNANCY IN THIRD TRIMESTER: Primary | ICD-10-CM

## 2021-06-25 LAB
ALBUMIN SERPL-MCNC: 2.7 G/DL (ref 3.4–5)
ALBUMIN UR-MCNC: NEGATIVE MG/DL
ALP SERPL-CCNC: 391 U/L (ref 40–150)
ALT SERPL W P-5'-P-CCNC: 26 U/L (ref 0–50)
AMYLASE SERPL-CCNC: 72 U/L (ref 30–110)
ANION GAP SERPL CALCULATED.3IONS-SCNC: 6 MMOL/L (ref 3–14)
APPEARANCE UR: CLEAR
AST SERPL W P-5'-P-CCNC: 19 U/L (ref 0–45)
BILIRUB SERPL-MCNC: 0.2 MG/DL (ref 0.2–1.3)
BILIRUB UR QL STRIP: NEGATIVE
BUN SERPL-MCNC: 8 MG/DL (ref 7–30)
CALCIUM SERPL-MCNC: 9.4 MG/DL (ref 8.5–10.1)
CHLORIDE SERPL-SCNC: 110 MMOL/L (ref 94–109)
CO2 SERPL-SCNC: 23 MMOL/L (ref 20–32)
COLOR UR AUTO: ABNORMAL
CREAT SERPL-MCNC: 0.51 MG/DL (ref 0.52–1.04)
ERYTHROCYTE [DISTWIDTH] IN BLOOD BY AUTOMATED COUNT: 12.9 % (ref 10–15)
GFR SERPL CREATININE-BSD FRML MDRD: >90 ML/MIN/{1.73_M2}
GLUCOSE SERPL-MCNC: 73 MG/DL (ref 70–99)
GLUCOSE UR STRIP-MCNC: 30 MG/DL
HCT VFR BLD AUTO: 37.4 % (ref 35–47)
HGB BLD-MCNC: 11.8 G/DL (ref 11.7–15.7)
HGB UR QL STRIP: NEGATIVE
KETONES UR STRIP-MCNC: NEGATIVE MG/DL
LEUKOCYTE ESTERASE UR QL STRIP: NEGATIVE
LIPASE SERPL-CCNC: 77 U/L (ref 73–393)
MCH RBC QN AUTO: 27.2 PG (ref 26.5–33)
MCHC RBC AUTO-ENTMCNC: 31.6 G/DL (ref 31.5–36.5)
MCV RBC AUTO: 86 FL (ref 78–100)
MUCOUS THREADS #/AREA URNS LPF: PRESENT /LPF
NITRATE UR QL: NEGATIVE
PH UR STRIP: 6.5 PH (ref 5–7)
PLATELET # BLD AUTO: 252 10E9/L (ref 150–450)
POTASSIUM SERPL-SCNC: 3.9 MMOL/L (ref 3.4–5.3)
PROT SERPL-MCNC: 7.2 G/DL (ref 6.8–8.8)
RBC # BLD AUTO: 4.34 10E12/L (ref 3.8–5.2)
RBC #/AREA URNS AUTO: <1 /HPF (ref 0–2)
SODIUM SERPL-SCNC: 139 MMOL/L (ref 133–144)
SOURCE: ABNORMAL
SP GR UR STRIP: 1.02 (ref 1–1.03)
SPECIMEN SOURCE: NORMAL
SQUAMOUS #/AREA URNS AUTO: 5 /HPF (ref 0–1)
UROBILINOGEN UR STRIP-MCNC: 0 MG/DL (ref 0–2)
WBC # BLD AUTO: 10.1 10E9/L (ref 4–11)
WBC #/AREA URNS AUTO: 2 /HPF (ref 0–5)
WET PREP SPEC: NORMAL

## 2021-06-25 PROCEDURE — 82150 ASSAY OF AMYLASE: CPT | Performed by: NURSE PRACTITIONER

## 2021-06-25 PROCEDURE — 59025 FETAL NON-STRESS TEST: CPT

## 2021-06-25 PROCEDURE — 76819 FETAL BIOPHYS PROFIL W/O NST: CPT

## 2021-06-25 PROCEDURE — 83690 ASSAY OF LIPASE: CPT | Performed by: NURSE PRACTITIONER

## 2021-06-25 PROCEDURE — 81001 URINALYSIS AUTO W/SCOPE: CPT | Performed by: NURSE PRACTITIONER

## 2021-06-25 PROCEDURE — 87210 SMEAR WET MOUNT SALINE/INK: CPT | Performed by: NURSE PRACTITIONER

## 2021-06-25 PROCEDURE — 59025 FETAL NON-STRESS TEST: CPT | Mod: 26 | Performed by: NURSE PRACTITIONER

## 2021-06-25 PROCEDURE — G0463 HOSPITAL OUTPT CLINIC VISIT: HCPCS | Mod: 25

## 2021-06-25 PROCEDURE — 36415 COLL VENOUS BLD VENIPUNCTURE: CPT | Performed by: NURSE PRACTITIONER

## 2021-06-25 PROCEDURE — 87086 URINE CULTURE/COLONY COUNT: CPT | Performed by: NURSE PRACTITIONER

## 2021-06-25 PROCEDURE — 85027 COMPLETE CBC AUTOMATED: CPT | Performed by: NURSE PRACTITIONER

## 2021-06-25 PROCEDURE — 99214 OFFICE O/P EST MOD 30 MIN: CPT | Mod: 25 | Performed by: NURSE PRACTITIONER

## 2021-06-25 PROCEDURE — 80053 COMPREHEN METABOLIC PANEL: CPT | Performed by: NURSE PRACTITIONER

## 2021-06-25 PROCEDURE — 76700 US EXAM ABDOM COMPLETE: CPT

## 2021-06-25 RX ORDER — DOCUSATE SODIUM 100 MG/1
100 CAPSULE, LIQUID FILLED ORAL 2 TIMES DAILY PRN
Qty: 60 CAPSULE | Refills: 1 | Status: SHIPPED | OUTPATIENT
Start: 2021-06-25 | End: 2021-07-06

## 2021-06-25 RX ORDER — ONDANSETRON 2 MG/ML
4 INJECTION INTRAMUSCULAR; INTRAVENOUS EVERY 6 HOURS PRN
Status: DISCONTINUED | OUTPATIENT
Start: 2021-06-25 | End: 2021-06-25 | Stop reason: HOSPADM

## 2021-06-25 ASSESSMENT — MIFFLIN-ST. JEOR: SCORE: 1378.18

## 2021-06-25 NOTE — TELEPHONE ENCOUNTER
37w4d    Has been having LRQ pain  Comes and goes very intermittent, noted in OV note 6/23/21    Pt is experiencing pain now, pt sounds to be in good amount of pain on phone  7-9/10.  Discussed with aTina DOZIER CNM, will send to [facility]    Pt in agreement, will arrive in approx 25-30 mins    Notified [facility] of pt arrival.  Routing chart to Midwife, aware    Daphnie Chin RN on 6/25/2021 at 4:05 PM

## 2021-06-25 NOTE — PROGRESS NOTES
1705: Pt arrives to [facility] for abdominal pain. Urine sample left and sent to lab at this time.  1710: Consent given to place external monitors and to obtain vital signs and medical history. Pt states she has been having intermittent right umbilical and abdominal pain for the last week. Pt states she felt like she pulled a muscle in her right abdomen around 1200, then at 1500 she was at the gas station and sharp, severe, constant pain started in her right abdomen. Pt states the pain is 8/10. Pt reports positive fetal movement, denies any leaking of fluid or vaginal bleeding. Pt states she feels like she may be ctx some.   1715: Consent to collect wet prep and for SVE. SVE 4/75/0. Wet prep sent to lab. Pt given heat pack for her abdominal pain.  1730: Taina DOZIER CNM, updated on pt status. Taina to see pt at bedside.  1800: Taina DOZIER CNM, at bedside. Ultrasound ordered.   1830: Reactive NST Ctx q2-7 min. Pt states the pain is better, now a 2/10. Pt states she has been suffering from some constipation lately, and it may be related to this.  1840: Pt to ultrasound. Monitors removed.   1915: Report to Vira MURDOCK RN, who will assume cares.

## 2021-06-25 NOTE — PROGRESS NOTES
[facility] CNM TRIAGE NOTE    Pa Kev Garcia is a 25 year old  with and IUP at 37w4d who presents with complaint of stabbing abd pain, RLQ that radiates to left side and umbilicus.  States that pain started suddenly at approx 1500, was constant until about 1530; pain was 8/10.  Now she states that she is still having RLQ abd pain, states that it is constant but much improved, rated 2-3/10.  Pa also states that she is also having low back pain and low pelvic cramping like contractions, states that cramping/back pain is irregular.  Pa states that she has been constipated and has been having less frequent BMs over the past 3 weeks, has not been taking anything OTC, had BM today, but states that she feels like she did not pass enough stool.    Patient states baby is active.  Denies ROM   Denies vaginal bleeding  [facility].      Problems this pregnancy:   1. Abdominal pain  2. BV-resolved  3. +FFN-has had full course of betamethasone  4. Bilateral fetal pyelectasis    ROS:  Patient is alert and oriented  C/o RLQ abd pain that radiates to umbilicus-decreasing in intensity    PHYSICAL EXAM:  /69   Temp 98.6  F (37  C) (Temporal)   Resp 20   Ht 1.524 m (5')   Wt 71.7 kg (158 lb)   LMP 2020 (Approximate)   BMI 30.86 kg/m      FHT's 150 with moderate variability  Accelerations: present   Decelerations:  absent        Contractions: Pt is lucero every 2-4 minutes, lasting 50-60 seconds and palpates mild to moderate.  At  contractions are irregular, approximately every 6 minutes, palpate mild.    Abdomen: gravid, soft between contractions, no rebound tenderness, no CVA tenderness  Bloody show: no  Cervix: 4/ 75% / Posterior/ soft/ 0; SVE at  unchanged  Membranes are intact   Recent Results (from the past 48 hour(s))   UA with Microscopic reflex to Culture    Collection Time: 21  5:05 PM    Specimen: Urine clean  catch; Midstream Urine   Result Value Ref Range    Color Urine Light Yellow     Appearance Urine Clear     Glucose Urine 30 (A) NEG^Negative mg/dL    Bilirubin Urine Negative NEG^Negative    Ketones Urine Negative NEG^Negative mg/dL    Specific Gravity Urine 1.019 1.003 - 1.035    Blood Urine Negative NEG^Negative    pH Urine 6.5 5.0 - 7.0 pH    Protein Albumin Urine Negative NEG^Negative mg/dL    Urobilinogen mg/dL 0.0 0.0 - 2.0 mg/dL    Nitrite Urine Negative NEG^Negative    Leukocyte Esterase Urine Negative NEG^Negative    Source Midstream Urine     WBC Urine 2 0 - 5 /HPF    RBC Urine <1 0 - 2 /HPF    Squamous Epithelial /HPF Urine 5 (H) 0 - 1 /HPF    Mucous Urine Present (A) NEG^Negative /LPF   Wet prep    Collection Time: 06/25/21  5:15 PM    Specimen: Vagina   Result Value Ref Range    Specimen Description Vagina     Wet Prep No Trichomonas seen     Wet Prep No yeast seen     Wet Prep No clue cells seen     Wet Prep Few  PMNs seen      Amylase    Collection Time: 06/25/21  6:18 PM   Result Value Ref Range    Amylase 72 30 - 110 U/L   CBC with platelets    Collection Time: 06/25/21  6:18 PM   Result Value Ref Range    WBC 10.1 4.0 - 11.0 10e9/L    RBC Count 4.34 3.8 - 5.2 10e12/L    Hemoglobin 11.8 11.7 - 15.7 g/dL    Hematocrit 37.4 35.0 - 47.0 %    MCV 86 78 - 100 fl    MCH 27.2 26.5 - 33.0 pg    MCHC 31.6 31.5 - 36.5 g/dL    RDW 12.9 10.0 - 15.0 %    Platelet Count 252 150 - 450 10e9/L   Comprehensive metabolic panel    Collection Time: 06/25/21  6:18 PM   Result Value Ref Range    Sodium 139 133 - 144 mmol/L    Potassium 3.9 3.4 - 5.3 mmol/L    Chloride 110 (H) 94 - 109 mmol/L    Carbon Dioxide 23 20 - 32 mmol/L    Anion Gap 6 3 - 14 mmol/L    Glucose 73 70 - 99 mg/dL    Urea Nitrogen 8 7 - 30 mg/dL    Creatinine 0.51 (L) 0.52 - 1.04 mg/dL    GFR Estimate >90 >60 mL/min/[1.73_m2]    GFR Estimate If Black >90 >60 mL/min/[1.73_m2]    Calcium 9.4 8.5 - 10.1 mg/dL    Bilirubin Total 0.2 0.2 - 1.3 mg/dL     Albumin 2.7 (L) 3.4 - 5.0 g/dL    Protein Total 7.2 6.8 - 8.8 g/dL    Alkaline Phosphatase 391 (H) 40 - 150 U/L    ALT 26 0 - 50 U/L    AST 19 0 - 45 U/L   Lipase    Collection Time: 21  6:18 PM   Result Value Ref Range    Lipase 77 73 - 393 U/L     UC: pending  ABD US: IMPRESSION: (preliminary result)  Possible fatty infiltration of the liver. Gallbladder is mildly  contracted but otherwise unremarkable.    BPP result:      Fetal breathing movements:  2 out of 2.  Gross body movement:   2 out of 2.  Fetal tone:        2 out of 2.  Amniotic fluid volume:    2 out of 2.     Presentation: Cephalic.   Fetal heart rate: 148 bpm. Regular rhythm.   Placenta: Posterior.  Amniotic fluid MVP: 4.6 cm.                                                                   IMPRESSION: Total biophysical profile score is 8 out of 8.    ASSESSMENT :   25 year old  with crowley IUP 37w4d not in labor  Abdominal pain resolved-US and labs WNL  Constipation  NST  reactive  GBS positive and membranes intact      PLAN:  Reassessed at : pain resolved, SVE unchanged, contractions mild, not painful, irregular.  She states that she feels comfortable going home.  Discharge home  Colace BID for constipation, increase PO fluids, increase fiber in diet.  PRESCRIPTION given for pregnancy support belt per request for pelvic discomfort and pressure  Continue routine prenatal care.  return to clinic Wed 2021 in the AM    Teaching done r/t to s/s of labor, SROM, decreased fetal movement, comfort measures in third trimester.  Instructed to please refer to the discharge handouts, the RN triage line or on-call CNM for any questions or concerns.  Pt verbalizes understanding and agreement with current plan of care.    PETER Mackey CNM  30 minutes spent on the date of the encounter doing chart review, patient visit and documentation

## 2021-06-26 VITALS
HEIGHT: 60 IN | DIASTOLIC BLOOD PRESSURE: 69 MMHG | BODY MASS INDEX: 31.02 KG/M2 | TEMPERATURE: 98.6 F | WEIGHT: 158 LBS | RESPIRATION RATE: 20 BRPM | SYSTOLIC BLOOD PRESSURE: 123 MMHG

## 2021-06-26 NOTE — PLAN OF CARE
Pt discharged home ambulatory and in stable condition. Discharge instructions given and discussed with pt, pt verbalizes understanding and teach back performed.  Taina Carlson CNM at bedside during discharge instructions.

## 2021-06-26 NOTE — DISCHARGE INSTRUCTIONS
Discharge Instruction for Undelivered Patients      You were seen for: Labor Assessment and Abdominal Pain  We Consulted: FAYE Carlson  You had (Test or Medicine):***     Diet:   Regular diet with increase fiber intake.          Call your provider if you notice:  Swelling in your face or increased swelling in your hands or legs.  Headaches that are not relieved by Tylenol (acetaminophen).  Changes in your vision (blurring: seeing spots or stars.)  Nausea (sick to your stomach) and vomiting (throwing up).   Weight gain of 5 pounds or more per week.  Heartburn that doesn't go away.  Signs of bladder infection: pain when you urinate (use the toilet), need to go more often and more urgently.  The bag of ramos (rupture of membranes) breaks, or you notice leaking in your underwear.  Bright red blood in your underwear.  Abdominal (lower belly) or stomach pain.  Second (plus) baby: Contractions (tightening) less than 10 minutes apart and getting stronger.  Increase or change in vaginal discharge (note the color and amount)  Other:      Follow-up: Plan to follow up in clinic Wednesday July 30th in the morning. Pickup stool softener prescription sent by FAYE Carlson to preferred pharmacy. Continue oral hydration, increase high fiber foods.

## 2021-06-27 LAB
BACTERIA SPEC CULT: NO GROWTH
Lab: NORMAL
SPECIMEN SOURCE: NORMAL

## 2021-07-02 NOTE — PROGRESS NOTES
Feels well, ready to be done, would like to schedule induction if possible  SVE 5/60/-2, posterior, medium, vertex, sutures palpated  Discussed advanced dilation, history of fast labor, and GBS, good candidate for induction of labor ASAP  Last labor 6hrs, first was 5 hrs, second was the fastest and right around 4hrs, she doesn't not think she has been this dilated before labor before and they live about 20 minutes away  Fetal movement: positive  L&D is unable to take her now, discussed with charge they may be able to accomodate her this evening, okay to do covid testing at the \[facility]\"" versus clinic now as the specimen will come back faster  Plan for antibiotics then pitocin or AROM  Pa agrees with plan, on call CNM notified of plan and will call L&D to check in on timing in 1-2 hours or plan to schedule for later this evening    Oneill Score:  Dilation of Cervix:  5-6     Score = 3  Effacement:  60-70%;   Score = 2  Consistency:  Average;   Score = 1  Position:  Posterior;   Score = 0  Station:  -2;   Score = 1  Total Oneill Score:  7      PETER Shelby, CNM

## 2021-07-06 ENCOUNTER — MYC MEDICAL ADVICE (OUTPATIENT)
Dept: MIDWIFE SERVICES | Facility: CLINIC | Age: 26
End: 2021-07-06

## 2021-07-06 ENCOUNTER — PRENATAL OFFICE VISIT (OUTPATIENT)
Dept: MIDWIFE SERVICES | Facility: CLINIC | Age: 26
End: 2021-07-06
Payer: COMMERCIAL

## 2021-07-06 VITALS — BODY MASS INDEX: 31.25 KG/M2 | DIASTOLIC BLOOD PRESSURE: 58 MMHG | SYSTOLIC BLOOD PRESSURE: 98 MMHG | WEIGHT: 160 LBS

## 2021-07-06 DIAGNOSIS — Z34.83 SUPERVISION OF NORMAL INTRAUTERINE PREGNANCY IN MULTIGRAVIDA IN THIRD TRIMESTER: Primary | ICD-10-CM

## 2021-07-06 DIAGNOSIS — Z34.90 ENCOUNTER FOR ELECTIVE INDUCTION OF LABOR: Primary | ICD-10-CM

## 2021-07-06 PROCEDURE — 59426 ANTEPARTUM CARE ONLY: CPT | Performed by: ADVANCED PRACTICE MIDWIFE

## 2021-07-06 PROCEDURE — 99207 PR PRENATAL VISIT: CPT | Performed by: ADVANCED PRACTICE MIDWIFE

## 2021-07-06 NOTE — TELEPHONE ENCOUNTER
Called Pa to let her know the IOL is scheduled for Thursday morning. She is agreeable to plan of care. COVID test to be done tomorrow in clinic.    Elisabeth Lozano, NABILA, APRN, CNM

## 2021-07-07 ENCOUNTER — ALLIED HEALTH/NURSE VISIT (OUTPATIENT)
Dept: NURSING | Facility: CLINIC | Age: 26
End: 2021-07-07
Payer: COMMERCIAL

## 2021-07-07 DIAGNOSIS — Z34.90 ENCOUNTER FOR ELECTIVE INDUCTION OF LABOR: ICD-10-CM

## 2021-07-07 LAB
LABORATORY COMMENT REPORT: NORMAL
SARS-COV-2 RNA RESP QL NAA+PROBE: NEGATIVE
SARS-COV-2 RNA RESP QL NAA+PROBE: NORMAL
SPECIMEN SOURCE: NORMAL
SPECIMEN SOURCE: NORMAL

## 2021-07-07 PROCEDURE — 99207 PR NO CHARGE NURSE ONLY: CPT

## 2021-07-07 PROCEDURE — U0005 INFEC AGEN DETEC AMPLI PROBE: HCPCS | Performed by: ADVANCED PRACTICE MIDWIFE

## 2021-07-07 PROCEDURE — U0003 INFECTIOUS AGENT DETECTION BY NUCLEIC ACID (DNA OR RNA); SEVERE ACUTE RESPIRATORY SYNDROME CORONAVIRUS 2 (SARS-COV-2) (CORONAVIRUS DISEASE [COVID-19]), AMPLIFIED PROBE TECHNIQUE, MAKING USE OF HIGH THROUGHPUT TECHNOLOGIES AS DESCRIBED BY CMS-2020-01-R: HCPCS | Performed by: ADVANCED PRACTICE MIDWIFE

## 2021-07-08 ENCOUNTER — TELEPHONE (OUTPATIENT)
Dept: MIDWIFE SERVICES | Facility: CLINIC | Age: 26
End: 2021-07-08

## 2021-07-08 ENCOUNTER — NURSE TRIAGE (OUTPATIENT)
Dept: NURSING | Facility: CLINIC | Age: 26
End: 2021-07-08

## 2021-07-08 NOTE — TELEPHONE ENCOUNTER
Call back to Pa after page by FNA. Pa states having contractions for the last hour, but only began timing them within the last 30 min. Contractions are every 2 minutes, lasting a minute. Some are more uncomfortable than others. No contractions noted during our 5 minute phone call.  Denies LOF, or vaginal bleeding. Positive FM. Is scheduled for an IOL this morning at 0730, but she is wondering if she needs to come sooner.   Recommended Pa come sooner as she lives 25+ minutes from the hospital, she is a , GBS positive, and is already 4 cm dilated. Pa seemed unsure about coming to the [facility] now. States sometimes she has contractions and then they resolve.   Plan made together to monitor for another hour and if contractions increase in strength/frequency, experience LOF, vaginal bleeding, or decreased FM to call back, otherwise, will keep plan for 0730 IOL.     Mabel GREEN CNM

## 2021-07-08 NOTE — TELEPHONE ENCOUNTER
Triage note    Caller name: Pa  Relation to patient: self    Patient scheduled for induction tomorrow at 7:30. Patient is . GBS+.    Patient calling because she's having contractions, pain 5/10, having them every 2 minutes and lasting about 1 minute each.  Patient is requesting a call directly with midwife [facility] to determine if she needs to go in to L&D. Page sent to [facility] midwife - Mabel Latif - at 2:25am.      Attempted call back to patient at 2:39am to determine if she was contacted by midwife -- call went to . Left generic message for patient to call the FNA line back ASAP if she has not been contacted yet by midwife.      Kimberly Pruitt RN  [facility] Nurse Advisor         Reason for Disposition    [1] History of prior delivery (multipara) AND [2] contractions < 10 minutes apart AND [3] present 1 hour    Additional Information    Negative: [1] First baby (primipara) AND [2] contractions < 6 minutes apart  AND [3] present 2 hours    Protocols used: PREGNANCY - LABOR-A-AH

## 2021-07-08 NOTE — TELEPHONE ENCOUNTER
Call to Pa to check in and follow up from our 0230 phone call and to discuss IOL that was suppose to begin at 0730 this morning. .     Induction was pushed back to 1100 but ultimately had to be canceled today due to L&D bed/staffing concerns. Soonest elective induction can now be scheduled is Saturday morning.     Pa stated after calling at 0230 this morning she took a bath and laid down. Contractions resolved. She is currently having some minor cramps, that are spaced apart and feel nothing like earlier. Denies LOF, vaginal bleeding and has positive FM. Encouraged to call clinic/FNA as soon as labor symptoms begin again and not delay calling.     Induction now scheduled for 7/10/21 at 0730 Aware to call hospital at 0630 on Saturday morning. Enter at door 6.     Mabel GREEN CNM

## 2021-07-08 NOTE — TELEPHONE ENCOUNTER
Called patient to see if she connected with midwife. Patient states she did. Patient is going to monitor herself for an hour; patient reports she has a history of contractions that go away. Patient states she was instructed by CNM to go in if the contractions don't improve. Also asked that patient call FNA line if contractions don't improve so FNA can notify hospital and CNM of her arrival. Patient agrees with plan and has no further questions/concerns at this time.    Kimberly Pruitt RN   07/08/21 3:03 AM  [facility] Nurse Advisor

## 2021-07-09 ENCOUNTER — MYC MEDICAL ADVICE (OUTPATIENT)
Dept: MIDWIFE SERVICES | Facility: CLINIC | Age: 26
End: 2021-07-09

## 2021-07-09 ENCOUNTER — NURSE TRIAGE (OUTPATIENT)
Dept: NURSING | Facility: CLINIC | Age: 26
End: 2021-07-09

## 2021-07-09 ENCOUNTER — TELEPHONE (OUTPATIENT)
Dept: OBGYN | Facility: CLINIC | Age: 26
End: 2021-07-09

## 2021-07-09 ENCOUNTER — TRANSFERRED RECORDS (OUTPATIENT)
Dept: HEALTH INFORMATION MANAGEMENT | Facility: CLINIC | Age: 26
End: 2021-07-09

## 2021-07-09 ENCOUNTER — HOSPITAL ENCOUNTER (INPATIENT)
Facility: CLINIC | Age: 26
LOS: 1 days | Discharge: HOME | End: 2021-07-11
Attending: ADVANCED PRACTICE MIDWIFE | Admitting: NURSE PRACTITIONER
Payer: COMMERCIAL

## 2021-07-09 DIAGNOSIS — D62 ANEMIA DUE TO BLOOD LOSS, ACUTE: ICD-10-CM

## 2021-07-09 DIAGNOSIS — B00.2 ORAL HERPES SIMPLEX INFECTION: ICD-10-CM

## 2021-07-09 PROCEDURE — G0463 HOSPITAL OUTPT CLINIC VISIT: HCPCS

## 2021-07-09 RX ORDER — VALACYCLOVIR HYDROCHLORIDE 1 G/1
2000 TABLET, FILM COATED ORAL 2 TIMES DAILY
Qty: 4 TABLET | Refills: 1 | Status: SHIPPED | OUTPATIENT
Start: 2021-07-09 | End: 2021-10-26

## 2021-07-09 ASSESSMENT — MIFFLIN-ST. JEOR: SCORE: 1387.26

## 2021-07-09 NOTE — TELEPHONE ENCOUNTER
Requested Prescriptions   Signed Prescriptions Disp Refills    valACYclovir (VALTREX) 1000 mg tablet 4 tablet 1     Sig: Take 2 tablets (2,000 mg) by mouth 2 times daily       There is no refill protocol information for this order        Prescription approved per [facility] Refill Protocol.    Kyleigh Crandall RN

## 2021-07-10 LAB
ABO + RH BLD: NORMAL
ABO + RH BLD: NORMAL
BASOPHILS # BLD AUTO: 0.1 10E9/L (ref 0–0.2)
BASOPHILS NFR BLD AUTO: 0.4 %
BLD GP AB SCN SERPL QL: NORMAL
BLOOD BANK CMNT PATIENT-IMP: NORMAL
DIFFERENTIAL METHOD BLD: ABNORMAL
EOSINOPHIL # BLD AUTO: 0.1 10E9/L (ref 0–0.7)
EOSINOPHIL NFR BLD AUTO: 1.1 %
ERYTHROCYTE [DISTWIDTH] IN BLOOD BY AUTOMATED COUNT: 13.2 % (ref 10–15)
HCT VFR BLD AUTO: 40.6 % (ref 35–47)
HGB BLD-MCNC: 12.6 G/DL (ref 11.7–15.7)
IMM GRANULOCYTES # BLD: 0.1 10E9/L (ref 0–0.4)
IMM GRANULOCYTES NFR BLD: 0.9 %
LYMPHOCYTES # BLD AUTO: 2.6 10E9/L (ref 0.8–5.3)
LYMPHOCYTES NFR BLD AUTO: 21.1 %
MCH RBC QN AUTO: 26.7 PG (ref 26.5–33)
MCHC RBC AUTO-ENTMCNC: 31 G/DL (ref 31.5–36.5)
MCV RBC AUTO: 86 FL (ref 78–100)
MONOCYTES # BLD AUTO: 1 10E9/L (ref 0–1.3)
MONOCYTES NFR BLD AUTO: 8.3 %
NEUTROPHILS # BLD AUTO: 8.3 10E9/L (ref 1.6–8.3)
NEUTROPHILS NFR BLD AUTO: 68.2 %
NRBC # BLD AUTO: 0 10*3/UL
NRBC BLD AUTO-RTO: 0 /100
PLATELET # BLD AUTO: 297 10E9/L (ref 150–450)
RBC # BLD AUTO: 4.72 10E12/L (ref 3.8–5.2)
SPECIMEN EXP DATE BLD: NORMAL
WBC # BLD AUTO: 12.2 10E9/L (ref 4–11)

## 2021-07-10 PROCEDURE — 250N000009 HC RX 250: Performed by: NURSE PRACTITIONER

## 2021-07-10 PROCEDURE — 250N000013 HC RX MED GY IP 250 OP 250 PS 637: Performed by: NURSE PRACTITIONER

## 2021-07-10 PROCEDURE — 85025 COMPLETE CBC W/AUTO DIFF WBC: CPT | Performed by: NURSE PRACTITIONER

## 2021-07-10 PROCEDURE — 120N000012 HC R&B POSTPARTUM

## 2021-07-10 PROCEDURE — 59410 OBSTETRICAL CARE: CPT | Performed by: NURSE PRACTITIONER

## 2021-07-10 PROCEDURE — 86900 BLOOD TYPING SEROLOGIC ABO: CPT | Performed by: NURSE PRACTITIONER

## 2021-07-10 PROCEDURE — 250N000011 HC RX IP 250 OP 636: Performed by: NURSE PRACTITIONER

## 2021-07-10 PROCEDURE — 86780 TREPONEMA PALLIDUM: CPT | Performed by: NURSE PRACTITIONER

## 2021-07-10 PROCEDURE — 86901 BLOOD TYPING SEROLOGIC RH(D): CPT | Performed by: NURSE PRACTITIONER

## 2021-07-10 PROCEDURE — 722N000001 HC LABOR CARE VAGINAL DELIVERY SINGLE

## 2021-07-10 PROCEDURE — 86850 RBC ANTIBODY SCREEN: CPT | Performed by: NURSE PRACTITIONER

## 2021-07-10 PROCEDURE — 258N000003 HC RX IP 258 OP 636: Performed by: NURSE PRACTITIONER

## 2021-07-10 RX ORDER — OXYTOCIN/0.9 % SODIUM CHLORIDE 30/500 ML
340 PLASTIC BAG, INJECTION (ML) INTRAVENOUS CONTINUOUS PRN
Status: DISCONTINUED | OUTPATIENT
Start: 2021-07-10 | End: 2021-07-11 | Stop reason: HOSPADM

## 2021-07-10 RX ORDER — OXYCODONE AND ACETAMINOPHEN 5; 325 MG/1; MG/1
1 TABLET ORAL
Status: DISCONTINUED | OUTPATIENT
Start: 2021-07-10 | End: 2021-07-10

## 2021-07-10 RX ORDER — IBUPROFEN 400 MG/1
800 TABLET, FILM COATED ORAL EVERY 6 HOURS PRN
Status: DISCONTINUED | OUTPATIENT
Start: 2021-07-10 | End: 2021-07-11 | Stop reason: HOSPADM

## 2021-07-10 RX ORDER — TRANEXAMIC ACID 10 MG/ML
1 INJECTION, SOLUTION INTRAVENOUS EVERY 30 MIN PRN
Status: DISCONTINUED | OUTPATIENT
Start: 2021-07-10 | End: 2021-07-10

## 2021-07-10 RX ORDER — PRENATAL VIT/IRON FUM/FOLIC AC 27MG-0.8MG
1 TABLET ORAL DAILY
Status: DISCONTINUED | OUTPATIENT
Start: 2021-07-10 | End: 2021-07-11 | Stop reason: HOSPADM

## 2021-07-10 RX ORDER — SODIUM CHLORIDE, SODIUM LACTATE, POTASSIUM CHLORIDE, CALCIUM CHLORIDE 600; 310; 30; 20 MG/100ML; MG/100ML; MG/100ML; MG/100ML
INJECTION, SOLUTION INTRAVENOUS CONTINUOUS
Status: DISCONTINUED | OUTPATIENT
Start: 2021-07-10 | End: 2021-07-10

## 2021-07-10 RX ORDER — TRANEXAMIC ACID 10 MG/ML
1 INJECTION, SOLUTION INTRAVENOUS EVERY 30 MIN PRN
Status: DISCONTINUED | OUTPATIENT
Start: 2021-07-10 | End: 2021-07-11 | Stop reason: HOSPADM

## 2021-07-10 RX ORDER — PENICILLIN G POTASSIUM 5000000 [IU]/1
5 INJECTION, POWDER, FOR SOLUTION INTRAMUSCULAR; INTRAVENOUS ONCE
Status: COMPLETED | OUTPATIENT
Start: 2021-07-10 | End: 2021-07-10

## 2021-07-10 RX ORDER — BISACODYL 10 MG
10 SUPPOSITORY, RECTAL RECTAL DAILY PRN
Status: DISCONTINUED | OUTPATIENT
Start: 2021-07-12 | End: 2021-07-11 | Stop reason: HOSPADM

## 2021-07-10 RX ORDER — AMOXICILLIN 250 MG
1 CAPSULE ORAL 2 TIMES DAILY
Status: DISCONTINUED | OUTPATIENT
Start: 2021-07-10 | End: 2021-07-11 | Stop reason: HOSPADM

## 2021-07-10 RX ORDER — NALOXONE HYDROCHLORIDE 0.4 MG/ML
0.2 INJECTION, SOLUTION INTRAMUSCULAR; INTRAVENOUS; SUBCUTANEOUS
Status: DISCONTINUED | OUTPATIENT
Start: 2021-07-10 | End: 2021-07-10

## 2021-07-10 RX ORDER — NALOXONE HYDROCHLORIDE 0.4 MG/ML
0.4 INJECTION, SOLUTION INTRAMUSCULAR; INTRAVENOUS; SUBCUTANEOUS
Status: DISCONTINUED | OUTPATIENT
Start: 2021-07-10 | End: 2021-07-10

## 2021-07-10 RX ORDER — IBUPROFEN 400 MG/1
800 TABLET, FILM COATED ORAL
Status: DISCONTINUED | OUTPATIENT
Start: 2021-07-10 | End: 2021-07-10

## 2021-07-10 RX ORDER — MISOPROSTOL 200 UG/1
400 TABLET ORAL
Status: DISCONTINUED | OUTPATIENT
Start: 2021-07-10 | End: 2021-07-11 | Stop reason: HOSPADM

## 2021-07-10 RX ORDER — METHYLERGONOVINE MALEATE 0.2 MG/ML
200 INJECTION INTRAVENOUS
Status: DISCONTINUED | OUTPATIENT
Start: 2021-07-10 | End: 2021-07-10

## 2021-07-10 RX ORDER — VALACYCLOVIR HYDROCHLORIDE 1 G/1
2000 TABLET, FILM COATED ORAL 2 TIMES DAILY
Status: DISCONTINUED | OUTPATIENT
Start: 2021-07-10 | End: 2021-07-11 | Stop reason: HOSPADM

## 2021-07-10 RX ORDER — AMOXICILLIN 250 MG
2 CAPSULE ORAL 2 TIMES DAILY
Status: DISCONTINUED | OUTPATIENT
Start: 2021-07-10 | End: 2021-07-11 | Stop reason: HOSPADM

## 2021-07-10 RX ORDER — OXYTOCIN 10 [USP'U]/ML
10 INJECTION, SOLUTION INTRAMUSCULAR; INTRAVENOUS
Status: DISCONTINUED | OUTPATIENT
Start: 2021-07-10 | End: 2021-07-11 | Stop reason: HOSPADM

## 2021-07-10 RX ORDER — ONDANSETRON 2 MG/ML
4 INJECTION INTRAMUSCULAR; INTRAVENOUS EVERY 6 HOURS PRN
Status: DISCONTINUED | OUTPATIENT
Start: 2021-07-10 | End: 2021-07-10

## 2021-07-10 RX ORDER — LIDOCAINE 40 MG/G
CREAM TOPICAL
Status: DISCONTINUED | OUTPATIENT
Start: 2021-07-10 | End: 2021-07-10

## 2021-07-10 RX ORDER — OXYTOCIN 10 [USP'U]/ML
10 INJECTION, SOLUTION INTRAMUSCULAR; INTRAVENOUS
Status: DISCONTINUED | OUTPATIENT
Start: 2021-07-10 | End: 2021-07-10

## 2021-07-10 RX ORDER — OXYTOCIN/0.9 % SODIUM CHLORIDE 30/500 ML
100 PLASTIC BAG, INJECTION (ML) INTRAVENOUS CONTINUOUS
Status: DISCONTINUED | OUTPATIENT
Start: 2021-07-10 | End: 2021-07-11 | Stop reason: HOSPADM

## 2021-07-10 RX ORDER — HYDROCORTISONE 2.5 %
CREAM (GRAM) TOPICAL 3 TIMES DAILY PRN
Status: DISCONTINUED | OUTPATIENT
Start: 2021-07-10 | End: 2021-07-11 | Stop reason: HOSPADM

## 2021-07-10 RX ORDER — CARBOPROST TROMETHAMINE 250 UG/ML
250 INJECTION, SOLUTION INTRAMUSCULAR
Status: DISCONTINUED | OUTPATIENT
Start: 2021-07-10 | End: 2021-07-10

## 2021-07-10 RX ORDER — ACETAMINOPHEN 325 MG/1
650 TABLET ORAL EVERY 4 HOURS PRN
Status: DISCONTINUED | OUTPATIENT
Start: 2021-07-10 | End: 2021-07-10

## 2021-07-10 RX ORDER — MODIFIED LANOLIN
OINTMENT (GRAM) TOPICAL
Status: DISCONTINUED | OUTPATIENT
Start: 2021-07-10 | End: 2021-07-11 | Stop reason: HOSPADM

## 2021-07-10 RX ORDER — ACETAMINOPHEN 325 MG/1
650 TABLET ORAL EVERY 4 HOURS PRN
Status: DISCONTINUED | OUTPATIENT
Start: 2021-07-10 | End: 2021-07-11 | Stop reason: HOSPADM

## 2021-07-10 RX ORDER — OXYTOCIN/0.9 % SODIUM CHLORIDE 30/500 ML
100-340 PLASTIC BAG, INJECTION (ML) INTRAVENOUS CONTINUOUS PRN
Status: COMPLETED | OUTPATIENT
Start: 2021-07-10 | End: 2021-07-10

## 2021-07-10 RX ADMIN — SENNOSIDES AND DOCUSATE SODIUM 1 TABLET: 8.6; 5 TABLET ORAL at 09:05

## 2021-07-10 RX ADMIN — VALACYCLOVIR HYDROCHLORIDE 2000 MG: 1 TABLET, FILM COATED ORAL at 09:05

## 2021-07-10 RX ADMIN — SODIUM CHLORIDE, POTASSIUM CHLORIDE, SODIUM LACTATE AND CALCIUM CHLORIDE: 600; 310; 30; 20 INJECTION, SOLUTION INTRAVENOUS at 00:10

## 2021-07-10 RX ADMIN — SENNOSIDES AND DOCUSATE SODIUM 1 TABLET: 8.6; 5 TABLET ORAL at 19:33

## 2021-07-10 RX ADMIN — Medication 340 ML/HR: at 03:27

## 2021-07-10 RX ADMIN — PRENATAL VITAMINS-IRON FUMARATE 27 MG IRON-FOLIC ACID 0.8 MG TABLET 1 TABLET: at 09:05

## 2021-07-10 RX ADMIN — PENICILLIN G POTASSIUM 5 MILLION UNITS: 5000000 POWDER, FOR SOLUTION INTRAMUSCULAR; INTRAPLEURAL; INTRATHECAL; INTRAVENOUS at 00:13

## 2021-07-10 RX ADMIN — VALACYCLOVIR HYDROCHLORIDE 2000 MG: 1 TABLET, FILM COATED ORAL at 19:33

## 2021-07-10 NOTE — PROVIDER NOTIFICATION
Data: Patient presented to [facility] at 2340.   Reason for maternal/fetal assessment per patient is Contractions  .  Patient is a . Prenatal record reviewed.      OB History    Para Term  AB Living   7 3 3 0 3 3   SAB TAB Ectopic Multiple Live Births   2 1 0 0 3      # Outcome Date GA Lbr Josh/2nd Weight Sex Delivery Anes PTL Lv   7 Current            6 Term 19 38w5d 06:10 / 00:11 2.9 kg (6 lb 6.3 oz) F Vag-Spont None N MARGARTE      Name: Everlyn      Apgar1: 8  Apgar5: 9   5 Term 17 40w0d 04:18 / 00:05 3.175 kg (7 lb) F Vag-Spont None N MARGARET      Name: HOWIE,BABY1 PA      Apgar1: 8  Apgar5: 9   4 2016     SAB      3  2016     SAB      2 TAB 2015     TAB      1 Term 12 38w4d 05:42 / 00:09 2.92 kg (6 lb 7 oz) M Vag-Spont Local N MARGARET      Name: HOWIE,B1 PA      Apgar1: 9  Apgar5: 9   . Medical history:   Past Medical History:   Diagnosis Date     Intrinsic eczema onset in winter since 14 y/o but in remission till recent delivery in 2017     Papanicolaou smear of cervix with atypical squamous cells of undetermined significance (ASC-US) 16     Possible recent exposure to tuberculosis 3/25/2019    On 2019 at clinicals at [facility].  No symptoms.  Recommended Pa speak to Employee nehal at [facility]. Consider TB Gold.    . Gestational Age 39w5d. VSS. Fetal movement present. Patient denies cramping, backache, vaginal discharge, pelvic pressure, UTI symptoms, GI problems, bloody show, vaginal bleeding, edema, headache, visual disturbances, epigastric or URQ pain, abdominal pain, rupture of membranes. Contractions started around 1900 and have continued to increase in frequency and intensity as the night progressed.   Support persons NOT present.  Action: Verbal consent for EFM. Triage assessment completed. EFM applied for fetal wellbeing during contractions. Uterine assessment soft and no tender to touch. Fetal assessment: Presumed adequate fetal  oxygenation documented (see flow record).        07/10/21 0005   Provider Notification   Provider Name/Title Taina Carlson FAYE   Method of Notification Phone   Request Evaluate in Person   Notification Reason Status Update       Response: Taina informed of but not limited to above information, contraction pattern, pt feeling pressure with contractions, and FHT's. Plan per provider is en route to hospital. Patient verbalized agreement with plan. Patient oriented to room and call light. Report given to Zaynab ACOSTA RN.

## 2021-07-10 NOTE — H&P
FAYE Labor Admission History & Physical    Pa Kev Garcia is a 25 year old  with an IUP at 39w5d  ; partnered,   Partner/support Person: Waltsri  Language Barrier: English  [facility]  Provider: FAYE's    Pa Kev Garcia is admitted to the Birth[facility] at [facility] on 7/10/2021 at 12:10 AM       History of present inllness/Chief Complaint:    Here with: spontaneous onset of labor  Patient reports contractions are Regular.  Has been having more intense contractions starting at 1900, became closer together and more intense with pelvic pressure just prior to arrival to L&D.           Baby active Yes  Membranes are intact.  Bloody show Yes   Any changes with medical history since last prenatal visit No      Obstetrical history  Estimated Date of Delivery: 2021 determined by 9w US  Patient's last menstrual period was 2020 (approximate).   Dating U/S: 2020    Fetal anatomic survey: Abnormal: left EIF and rt pyelectasis on 21w anatomy US.  All other anatomy WNL  Placenta: posterior    PRENATAL COURSE  Prenatal care began at 9 wks gestation for a total of 8 prenatal visits.  Total wt gain 15 lbs; There is no height or weight on file to calculate BMI.  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       Pyelectasis of fetus on prenatal ultrasound 2021     Priority: Medium     Bilateral 4 mm-f/u after delivery       Supervision of normal intrauterine pregnancy in multigravida in third trimester 2020      Priority: Medium      FOB: Foua   NIKIA: Jul 12, 2021  O+/posterior/Boy  Innatal/AFP Neg    Tdap: offer next visit    Flu:      GBS:         Clinic/Hospital: [facility]  Partner Name:  Natasha  Ultrasound predicts sex: male  Childrens names/ages:   Previous labor experiences:   Waterbirth (interest, declined, ineligible): interested, Hep C drawn, information sheet / education _____  Level of education/occupation: in nursing school  Pertinent History:   PP contraception:   Hx PPD/Depression/Anxiety:   Birth Ed:   Plans for labor pain management:   Plans for post partum recovery/time off:   Feeding preference: bottle only-- wants more independence from baby for school schedule  Breast pump: not needed  Peds provider:  Dr. Barrios with [facility]  Car seat: has  Circumcision: no  Discussed 2 week visit:   Tdap:   Flu: done  Covid vaccine: considering, will likely accept PP         Papanicolaou smear of cervix with atypical squamous cells of undetermined significance (ASC-US) 05/25/2016     Priority: Medium     5/25/16: Pap - ASCUS. Plan pap in 1 year (20 years old).   03/2017 patient report negative       Tdap: declined  Rhogam: NA; O+    Patient Active Problem List   Diagnosis     Papanicolaou smear of cervix with atypical squamous cells of undetermined significance (ASC-US)     Supervision of normal intrauterine pregnancy in multigravida in third trimester     Pyelectasis of fetus on prenatal ultrasound     H/O cold sores     Positive fetal fibronectin at 22 weeks to 34 weeks gestation     Constipation during pregnancy in third trimester     Indication for care in labor and delivery, antepartum       HISTORY  Allergies   Allergen Reactions     Reglan [Metoclopramide] Anxiety     Past Medical History:   Diagnosis Date     Intrinsic eczema onset in winter since 12 y/o but in remission till recent delivery in 11-17 12/14/2017     Papanicolaou smear of cervix with atypical squamous cells of undetermined significance  (ASC-US) 16     Possible recent exposure to tuberculosis 3/25/2019    On 2019 at clinicals at [facility].  No symptoms.  Recommended Pa speak to Employee nehal at [facility]. Consider TB Gold.      Past Surgical History:   Procedure Laterality Date     HC OPEN RX DISTAL RADIUS FX, EXTRA-ARTICULAR      left arm fx     WISDOM TOOTH EXTRACTION       Family History   Problem Relation Age of Onset     Unknown/Adopted Mother      C.A.D. Father 70        MI     Hypertension Father      Coronary Artery Disease Father      Cerebrovascular Disease Father      Diabetes No family hx of      Hyperlipidemia No family hx of      Breast Cancer No family hx of      Colon Cancer No family hx of      Prostate Cancer No family hx of      Other Cancer No family hx of      Depression No family hx of      Anxiety Disorder No family hx of      Mental Illness No family hx of      Substance Abuse No family hx of      Anesthesia Reaction No family hx of      Asthma No family hx of      Osteoporosis No family hx of      Genetic Disorder No family hx of      Thyroid Disease No family hx of      Obesity No family hx of      Social History     Tobacco Use     Smoking status: Former Smoker     Packs/day: 0.00     Smokeless tobacco: Never Used   Substance Use Topics     Alcohol use: Not Currently     Frequency: Monthly or less     Drinks per session: 3 or 4     Binge frequency: Less than monthly     OB History    Para Term  AB Living   7 3 3 0 3 3   SAB TAB Ectopic Multiple Live Births   2 1 0 0 3      # Outcome Date GA Lbr Josh/2nd Weight Sex Delivery Anes PTL Lv   7 Current            6 Term 19 38w5d 06:10 / 00:11 2.9 kg (6 lb 6.3 oz) F Vag-Spont None N MARGARET      Name: Everlyn      Apgar1: 8  Apgar5: 9   5 Term 17 40w0d 04:18 / 00:05 3.175 kg (7 lb) F Vag-Spont None N MARGARET      Name: HOWIE,BABY1 PA      Apgar1: 8  Apgar5: 9   4 SAB 2016     SAB      3 SAB 2016     SAB      2 TAB 2015     TAB      1  Term 12 38w4d 05:42 / 00:09 2.92 kg (6 lb 7 oz) M Vag-Spont Local N MARGARET      Name: JUSTIN DENISE PA      Apgar1: 9  Apgar5: 9       LABS:  Lab Results   Component Value Date    ABO O 2020    RH Pos 2020    AS Neg 2020    HGB 11.8 2021    HEPBANG Nonreactive 2020    CHPCRT Negative 2021    GCPCRT Negative 2021    TREPAB Negative 2017    RUBELLAABIGG 21 2012    HIV Negative 2012       GBS Status:   Lab Results   Component Value Date    GBS Positive (A) 2021     Rubella: Immune    HIV: Non-Reactive   Platelets:  252 2021  1hr GCT:  129    ROS   Pt is alert and oriented  Pt denies significant constitutional symptoms including fever and/or malaise.    Pt denies significant respiratory, cardiovacular, GI, or muscular/skeletal complaints.    Neuro: Denies HA and visual changes  Muscoloskeletal: Denies except for discomforts r/t pregnancy     PHYSICAL EXAM:  LMP 2020 (Approximate)   General appearance:  healthy, alert, active and mild distress with ctx  Heart: RRR  Lungs: CTA bilaterally, normal respiratory effort  Abdomen: gravid, single vertex fetus, non-tender, EFW 7 lbs.   Legs:  No bilateral edema     Contractions: Pt is lucero every 2-4 minutes, lasting  seconds and palpates strong    Fetal heart tones: Baseline 135   Variability: moderate   Accelerations: present  Decelerations: absent    NST: reactive    Cervix: 6-7/ 70-80%/ Mid/ soft/ 0, Vtx  Bloody show: yes with SVE  Membranes:  intact    ASSESSMENT:  25 year old  with crowley IUP 39w5d in active labor  NST reactive  GBS positive and membranes intact  COVID negative    PLAN:  Routine CNM care  Labs ordered: CBC, type and screen, syphilis screening  Teaching done r/t comfort measures, pain management options, and labor processes.   Admit - see IP orders  Pain medication if requested  Anticipate     PETER Mackey CNM

## 2021-07-10 NOTE — LACTATION NOTE
Initial visit with Pa, FOB, and infant. This is mothers 4th baby. 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 Breastfeeding has been going well per mom.     Reviewed general breastfeeding and formula information.    Advised to breastfeed on demand. Instructed pt to breastfeed first,  then bottle feed infant. Encouraged rooming in, skin to skin, feeding on demand 8-12x/day or sooner if baby cues. Explained benefits of holding and skin to skin. Encouraged lots of skin to skin. Instructed on hand expression.    Pa has a pump for home. Will be following up with [facility] in [place]. No further questions at this time. Pt very receptive to information and smiling. Thanked writer for help and tips. Will follow as needed.     Alba Juarez, Lactation Educator

## 2021-07-10 NOTE — TELEPHONE ENCOUNTER
Called and spoke with Pa.  She states that over the past 3 hours she has been having more intense contractions every 6-8 min, lasting 40-60 seconds, states that contractions are starting to feel more intense.  +fetal movement; denies loss of fluid.  She states that she wanted to make a plan as to when to come to the hospital.  Counseled that she can present to L&D at anytime for admission for labor based on her report of more intense contractions and history of fast labor.  Pa states that she needs to get her kids care lined up then can come to the hospital.  FAYE will plan to call Pa back in 1 hour if she has not presented to hospital yet.     aTina GREEN CNM, [facility]  [phone]

## 2021-07-10 NOTE — L&D DELIVERY NOTE
"I was called to the room at 0245 d/t Pa feeling overwhelming urge to push.  SVE at that time 9/95%/0/BBOW.  At 0310, Pa states that she \"can't not push\", started spontaneously bearing down, had SROM, and was complete and +2.  Pa pushed with excellent effort and fetal movement vertex, had  at 0321 over intact perineum.     Delivery Summary    Kd Garcia MRN# [id number]   Age: 25 year old YOB: 1995          Jose, Male-Kd Angulo [[id number]]    Labor Event Times    Labor onset date: 21 Onset time:  7:00 PM   Dilation complete date: 7/10/21 Complete time:  3:10 AM   Start pushing date/time: 7/10/2021 0319      Labor Events     labor?: No   steroids: None  Labor Type: Spontaneous  Predominate monitoring during 1st stage: continuous electronic fetal monitoring     Antibiotics received during labor?: Yes  Reason for Antibiotics: GBS  Antibiotics received for GBS: Penicillin  Antibiotics Given (GBS): Less than or equal to 4 hours prior to delivery     Rupture date/time: 7/10/21 0312   Rupture type: Spontaneous rupture of membranes occuring during spontaneous labor or augmentation  Fluid color: Clear     Augmentation: None  1:1 continuous labor support provided by?: RN       Delivery/Placenta Date and Time    Delivery Date: 7/10/21 Delivery Time:  3:21 AM   Placenta Date/Time: 7/10/2021  3:27 AM  Oxytocin given at the time of delivery: after delivery of baby  Delivering clinician: Taina Carlson APRN CNM   Other personnel present at delivery:  Provider Role   Taina Carlson APRN CNM Harrison, Lauren, RN Gebretsadik, Shawna M, RN          Vaginal Counts     Initial count performed by 2 team members:  Two Team Members   FAYE Ramirez, VANITA       Needles Suture Needles Sponges (RETIRED) Instruments   Initial counts 2 0 5    Added to count 0 0 0    Relief counts       Final counts 2 0 5          Placed during labor Accounted for at the end of labor " "  FSE  NA   IUPC  NA   Cervadil  NA              Final count performed by 2 team members:  Two Team Members   VANITA Guillaume CNM      Final count correct?: Yes     Apgars    Living status: Living   1 Minute 5 Minute 10 Minute 15 Minute 20 Minute   Skin color: 0  1       Heart rate: 2  2       Reflex irritability: 2  2       Muscle tone: 2  2       Respiratory effort: 2  2       Total: 8  9       Apgars assigned by: JAY MCDERMOTT RN     Cord    Vessels: 3 Vessels    Cord Complications: None               Cord Blood Disposition: Lab    Delayed cord clamping?: Yes    Cord Clamping Delay (seconds):  seconds    Stem cell collection?: No       Corona Resuscitation    Methods: None      Measurements    Weight: 7 lb 10.8 oz Length: 1' 8\"   Head circumference: 34.3 cm       Skin to Skin and Feeding Plan    Skin to skin initiation date/time:     Skin to skin end date/time:     Reason skin to skin not initiated: Patient Refused     Labor Events and Shoulder Dystocia    Fetal Tracing Prior to Delivery: Category 1  Shoulder dystocia present?: Neg     Delivery (Maternal) (Provider to Complete) ([id number])    Episiotomy: None  Perineal lacerations: None    Repair suture: None  Genital tract inspection done: Pos     Blood Loss  Mother: Kd Garcia #[id number]   Start of Mother's Information    IO Blood Loss  21 1900 - 07/10/21 0424    Delivery QBL (mL) Hospital Encounter 151 mL    Total  151 mL         End of Mother's Information  Mother: Kd Garcia #[id number]          Delivery - Provider to Complete ([id number])    Delivering clinician: Taina Carlson APRN CNM CNM Care: Exclusive CNM care in labor  Attempted Delivery Types (Choose all that apply): Spontaneous Vaginal Delivery  Delivery Type (Choose the 1 that will go to the Birth History): Vaginal, Spontaneous                   Other personnel:  Provider Role   Taina Carlson APRN CNM Harrison, Lauren, RN Gebretsadik, Shawna M, RN "                 Placenta    Date/Time: 7/10/2021  3:27 AM  Removal: Spontaneous  Comments: via Coleman mechanism, all cotyledons intact, 3v cord  Disposition: Hospital disposal           Anesthesia    Method: None                Presentation and Position    Presentation: Vertex    Position: Middle Occiput Anterior               ASSESSMENT & PLAN:   26yo  s/p  viable infant boy at 39w 5d  GBS positive, not adequately treated (<4 hrs after 1st dose of PCN)  Lactating mother    Plan:  Routine PP cares  Lactation consult  Hgb 711 AM  Peds will assess baby d/t pyelectasis and inadequate GBS treatment  Discharge day 1 or 2       PETER Mackey CNM

## 2021-07-10 NOTE — TELEPHONE ENCOUNTER
"39 w 4 d pregnant.  Having mild contractions started 3 hours ago.  Contractions are 7-8 minutes apart.  Has a HX of rapid deliveries Last labor 6hrs, first was 5 hrs, second was the fastest and right around 4 hrs.  Is scheduled for induction on 7/10 at 0730.  Writer paged midwife Taina Robyn at 8:38 pm.  Taina called patient and instructed her to come into [facility] L & D now.  Vera Arevalo, RN MAN   Triage Nurse Advisor [facility]      Reason for Disposition    [1] History of rapid prior delivery AND [2] contractions < 10 minutes apart    Additional Information    Negative: Passed out (i.e., lost consciousness, collapsed and was not responding)    Negative: Umbilical cord hanging out of the vagina (shiny, white, curled appearance, \"like telephone cord\")    Negative: Uncontrollable urge to push (i.e., feels like baby is coming out now)    Negative: Can see baby    Negative: Sounds like a life-threatening emergency to the triager    Negative: Shock suspected (e.g., cold/pale/clammy skin, too weak to stand, low BP, rapid pulse)    Negative: Difficult to awaken or acting confused (e.g., disoriented, slurred speech)    Negative: [1] SEVERE abdominal pain (e.g., excruciating) AND [2] constant AND [3] present > 1 hour    Negative: Severe bleeding (e.g., continuous red blood from vagina, or large blood clots)    Negative: [1] First baby (primipara) AND [2] contractions < 6 minutes apart  AND [3] present 2 hours    Negative: [1] History of prior delivery (multipara) AND [2] contractions < 10 minutes apart AND [3] present 1 hour    Protocols used: PREGNANCY - LABOR-A-      "

## 2021-07-11 ENCOUNTER — MYC MEDICAL ADVICE (OUTPATIENT)
Dept: MIDWIFE SERVICES | Facility: CLINIC | Age: 26
End: 2021-07-11

## 2021-07-11 VITALS
HEIGHT: 60 IN | HEART RATE: 66 BPM | WEIGHT: 160 LBS | SYSTOLIC BLOOD PRESSURE: 99 MMHG | BODY MASS INDEX: 31.41 KG/M2 | TEMPERATURE: 97.9 F | RESPIRATION RATE: 16 BRPM | DIASTOLIC BLOOD PRESSURE: 68 MMHG

## 2021-07-11 PROBLEM — D62 ANEMIA DUE TO BLOOD LOSS, ACUTE: Status: ACTIVE | Noted: 2021-07-11

## 2021-07-11 PROBLEM — D62 ANEMIA DUE TO BLOOD LOSS, ACUTE: Chronic | Status: ACTIVE | Noted: 2021-07-11

## 2021-07-11 LAB — HGB BLD-MCNC: 9.7 G/DL (ref 11.7–15.7)

## 2021-07-11 PROCEDURE — 85018 HEMOGLOBIN: CPT | Performed by: NURSE PRACTITIONER

## 2021-07-11 PROCEDURE — 36415 COLL VENOUS BLD VENIPUNCTURE: CPT | Performed by: NURSE PRACTITIONER

## 2021-07-11 PROCEDURE — 250N000013 HC RX MED GY IP 250 OP 250 PS 637: Performed by: NURSE PRACTITIONER

## 2021-07-11 RX ORDER — AMOXICILLIN 250 MG
1 CAPSULE ORAL 2 TIMES DAILY PRN
Qty: 28 TABLET | Refills: 0 | Status: SHIPPED | OUTPATIENT
Start: 2021-07-11 | End: 2021-07-25

## 2021-07-11 RX ORDER — ASCORBIC ACID 500 MG
500 TABLET ORAL DAILY
Status: DISCONTINUED | OUTPATIENT
Start: 2021-07-11 | End: 2021-07-11 | Stop reason: HOSPADM

## 2021-07-11 RX ORDER — ASCORBIC ACID 500 MG
500 TABLET ORAL DAILY
Qty: 28 TABLET | Refills: 0 | Status: SHIPPED | OUTPATIENT
Start: 2021-07-12 | End: 2021-07-26

## 2021-07-11 RX ORDER — IBUPROFEN 800 MG/1
800 TABLET, FILM COATED ORAL EVERY 6 HOURS PRN
Qty: 60 TABLET | Refills: 0 | Status: SHIPPED | OUTPATIENT
Start: 2021-07-11 | End: 2021-07-26

## 2021-07-11 RX ORDER — LANOLIN ALCOHOL/MO/W.PET/CERES
1000 CREAM (GRAM) TOPICAL DAILY
Status: DISCONTINUED | OUTPATIENT
Start: 2021-07-11 | End: 2021-07-11 | Stop reason: HOSPADM

## 2021-07-11 RX ORDER — FERROUS GLUCONATE 324(38)MG
324 TABLET ORAL
Qty: 14 TABLET | Refills: 0 | Status: SHIPPED | OUTPATIENT
Start: 2021-07-12 | End: 2021-07-26

## 2021-07-11 RX ORDER — FERROUS GLUCONATE 324(38)MG
324 TABLET ORAL
Status: DISCONTINUED | OUTPATIENT
Start: 2021-07-11 | End: 2021-07-11 | Stop reason: HOSPADM

## 2021-07-11 RX ORDER — ACETAMINOPHEN 325 MG/1
650 TABLET ORAL EVERY 4 HOURS PRN
COMMUNITY
Start: 2021-07-11 | End: 2021-07-26

## 2021-07-11 RX ORDER — MODIFIED LANOLIN
OINTMENT (GRAM) TOPICAL
COMMUNITY
Start: 2021-07-11 | End: 2021-10-26

## 2021-07-11 RX ADMIN — IBUPROFEN 800 MG: 400 TABLET ORAL at 04:20

## 2021-07-11 RX ADMIN — VALACYCLOVIR HYDROCHLORIDE 2000 MG: 1 TABLET, FILM COATED ORAL at 08:06

## 2021-07-11 RX ADMIN — SENNOSIDES AND DOCUSATE SODIUM 1 TABLET: 8.6; 5 TABLET ORAL at 08:06

## 2021-07-11 RX ADMIN — IBUPROFEN 800 MG: 400 TABLET ORAL at 10:41

## 2021-07-11 RX ADMIN — FERROUS GLUCONATE 324 MG: 324 TABLET ORAL at 10:41

## 2021-07-11 RX ADMIN — SENNOSIDES AND DOCUSATE SODIUM 1 TABLET: 8.6; 5 TABLET ORAL at 20:02

## 2021-07-11 RX ADMIN — ACETAMINOPHEN 650 MG: 325 TABLET, FILM COATED ORAL at 04:20

## 2021-07-11 RX ADMIN — CYANOCOBALAMIN TAB 1000 MCG 1000 MCG: 1000 TAB at 12:03

## 2021-07-11 RX ADMIN — ACETAMINOPHEN 650 MG: 325 TABLET, FILM COATED ORAL at 10:41

## 2021-07-11 RX ADMIN — OXYCODONE HYDROCHLORIDE AND ACETAMINOPHEN 500 MG: 500 TABLET ORAL at 12:03

## 2021-07-11 RX ADMIN — VALACYCLOVIR HYDROCHLORIDE 2000 MG: 1 TABLET, FILM COATED ORAL at 20:02

## 2021-07-11 RX ADMIN — PRENATAL VITAMINS-IRON FUMARATE 27 MG IRON-FOLIC ACID 0.8 MG TABLET 1 TABLET: at 08:06

## 2021-07-11 NOTE — PROGRESS NOTES
"[facility] Progress Note: Postpartum Day #1    2021  10:15 AM    SUBJECTIVE:  Patient is stable and is tolerating acitivity well  Baby is rooming in  Complications since 2 hours post delivery: None  Pain is well controlled.  Patient is taking pain medications.  Breastfeeding status:initiated   Elimination:  She is voiding without difficulty.  She has not had a bowel movement  Desired contraception Unsure  Denies heavy bleeding and passing large clots.  Feels happy about birth experience but feeling \"bummed\" that GBS was not adequately treated.  Would like to go home later today instead of tomorrow AM.  States that she feels like she is getting cold sore on lips, started taking PO Valtrex yesterday    OBJECTIVE:  BP 98/60   Pulse 61   Temp 97.9  F (36.6  C) (Oral)   Resp 18   Ht 1.524 m (5')   Wt 72.6 kg (160 lb)   LMP 2020 (Approximate)   Breastfeeding Unknown   BMI 31.25 kg/m      Constitutional: healthy, alert and no distress    Breasts: Currently breastfeeding    Fundus: Uterine fundus is firm, non-tender and at 2FB below the level of the umbilicus per RN flowsheet    Perineum: Perineum is intact, minimal swelling    Lochia: Lochia is appropriate for the duration of time since delivery.     ASSESSMENT:  PPD #1    Doing well.  No excessive bleeding  Pain well-controlled.  Hemoglobin   Date Value Ref Range Status   2021 9.7 (L) 11.7 - 15.7 g/dL Final   ]      PLAN:  Continue routine care  OK to discontinue PIV  Ambulation encouraged  Iron supplementation  Lactation consultation  Reviewed breastfeeding  Reviewed postpartum warning signs  Reviewed postpartum blues and postpartum depression warning signs  Reviewed postpartum care plan including MyChart check-in within one week, 2 week and 6 week visits.  Plans unsure for contraception postpartum, would like to discuss at 2 week PP visit  Anticipated discharge 7/11 PM or 7/12 AM  Needs prescriptions for iron, vitamins C and " B12, ibuprofen, colace and valtrex sent to hospital pharmacy or preferred pharmacy on file      PETER MackeyM

## 2021-07-11 NOTE — DISCHARGE SUMMARY
[facility]    Discharge Summary  Obstetrics    Date of Admission:  2021  Date of Discharge:  2021  Discharging Provider: Taina Carlson  Date of Service (when I saw the patient): 21    Discharge Diagnoses     Lactating mother  Anemia due to acute blood loss  History of HSV-1    History of Present Illness   Kd Garcia is a 25 year old female who presented with spontaneous onset of labor and +GBS status.  Her labor progressed quickly and she had SVB viable infant boy.  She received 1 dose of IV antibiotics approximately 3 hours prior to giving birth.  Her PP recovery has been unremarkable except for anemia.      Hospital Course   The patient's hospital course was unremarkable.  She recovered as anticipated and experienced no post-delivery complications. On discharge, her pain was well controlled. Vaginal bleeding is stable.  Voiding without difficulty.  Ambulating well and tolerating a normal diet.  No fever.  Breastfeeding well, is also bottle feeding.  Infant is stable.  She was discharged on post-partum day #1 after baby cleared for discharge.    Post-partum hemoglobin:   Hemoglobin   Date Value Ref Range Status   2021 9.7 (L) 11.7 - 15.7 g/dL Final         Discharge Disposition   Discharged to home   Condition at discharge: Stable    Primary Care Physician   Physician No Ref-Primary    Consultations This Hospital Stay   ANESTHESIOLOGY IP CONSULT  HOME CARE POST PARTUM/ IP CONSULT  LACTATION IP CONSULT    Discharge Orders      Activity    Activity as tolerated     Reason for your hospital stay    Maternity care     Follow Up    Follow up with provider in 2 weeks and 6 weeks for post-delivery checks     Diet    Resume previous diet     Discharge Medications   Current Discharge Medication List      START taking these medications    Details   acetaminophen (TYLENOL) 325 MG tablet Take 2 tablets (650 mg) by mouth every 4 hours as needed for mild pain or  fever (use for pain and/or fever)    Associated Diagnoses:  (normal spontaneous vaginal delivery); Lactating mother      cyanocobalamin (CYANOCOBALAMIN) 1000 MCG tablet Take 1 tablet (1,000 mcg) by mouth daily for 14 days  Qty: 14 tablet, Refills: 0    Associated Diagnoses:  (normal spontaneous vaginal delivery); Anemia due to blood loss, acute; Lactating mother      ferrous gluconate (FERGON) 324 (38 Fe) MG tablet Take 1 tablet (324 mg) by mouth daily (with breakfast) for 14 days  Qty: 14 tablet, Refills: 0    Associated Diagnoses:  (normal spontaneous vaginal delivery); Anemia due to blood loss, acute; Lactating mother      ibuprofen (ADVIL/MOTRIN) 800 MG tablet Take 1 tablet (800 mg) by mouth every 6 hours as needed for other (cramping)  Qty: 60 tablet, Refills: 0    Associated Diagnoses:  (normal spontaneous vaginal delivery); Lactating mother      lanolin ointment Apply topically every hour as needed for dry skin (sore nipples)    Associated Diagnoses:  (normal spontaneous vaginal delivery); Lactating mother      senna-docusate (SENOKOT-S/PERICOLACE) 8.6-50 MG tablet Take 1 tablet by mouth 2 times daily as needed for constipation  Qty: 28 tablet, Refills: 0    Associated Diagnoses:  (normal spontaneous vaginal delivery); Anemia due to blood loss, acute; Lactating mother      vitamin C (ASCORBIC ACID) 500 MG tablet Take 1 tablet (500 mg) by mouth daily for 14 days  Qty: 28 tablet, Refills: 0    Associated Diagnoses:  (normal spontaneous vaginal delivery); Anemia due to blood loss, acute; Lactating mother         CONTINUE these medications which have NOT CHANGED    Details   Prenatal Vit-Fe Fumarate-FA (PRENATAL VITAMIN AND MINERAL PO)       valACYclovir (VALTREX) 1000 mg tablet Take 2 tablets (2,000 mg) by mouth 2 times daily  Qty: 4 tablet, Refills: 1    Associated Diagnoses: Oral herpes simplex infection           Allergies   Allergies   Allergen Reactions     Reglan  [Metoclopramide] Anxiety             PETER MackeyM

## 2021-07-11 NOTE — LACTATION NOTE
Follow up visit with Pa. Pa has chosen to bottle formula for baby from now on. Discussed signs of engorgement and comfort measures.     Aminta Kelley RN, IBCLC

## 2021-07-11 NOTE — DOWNTIME EVENT NOTE
The EMR was down for 4 hours on 7/11/2021.    TASIA CODY was responsible for completing the paper charting during this time period.     The following information was re-entered into the system by Cristel Méndez RN: MAR    The following information will remain in the paper chart: KAITLIN Méndez RN  7/11/2021

## 2021-07-12 DIAGNOSIS — D62 ANEMIA DUE TO BLOOD LOSS, ACUTE: ICD-10-CM

## 2021-07-12 LAB — T PALLIDUM AB SER QL: NONREACTIVE

## 2021-07-12 NOTE — TELEPHONE ENCOUNTER
"Requested Prescriptions   Pending Prescriptions Disp Refills     vitamin B-12 (CYANOCOBALAMIN) 1000 MCG tablet 14 tablet 0     Sig: Take 1 tablet (1,000 mcg) by mouth daily       Vitamin Supplements (Adult) Protocol Passed - 7/12/2021 11:52 AM        Passed - High dose Vitamin D not ordered        Passed - Recent (12 mo) or future (30 days) visit within the authorizing provider's specialty     Patient has had an office visit with the authorizing provider or a provider within the authorizing providers department within the previous 12 mos or has a future within next 30 days. See \"Patient Info\" tab in inbasket, or \"Choose Columns\" in Meds & Orders section of the refill encounter.              Passed - Medication is active on med list         Last Written Prescription Date:  7/12/2021  Last Fill Quantity: 14,  # refills: 0   Last office visit: 12/17/2019 with prescribing provider:  Taina Carlson CNM   Future Office Visit:    Refill denied-therapy completed  Raiza Ruff RN on 7/12/2021 at 11:55 AM          "

## 2021-07-12 NOTE — PLAN OF CARE
Patient admitted into room 404 per wheelchair accompanied by , , and L&D RN. Oriented to room, call light, safety measures, and postpartum routines. Verified  bands with parents and L&D RN. Plan of care and teaching initiated, answered questions.   Vital signs stable, afebrile, voiding well, ice and tucks to perineum prn, FFU/1 small rubra lochia, able to ambulate without dizziness, able to rest, has not yet required medications for pain, breast feeding  on demand.  is here and is supportive.   
Patient has stable vitals.  Denies need for pain meds when offered. Fundus firm at U/1. Scant rubra flow.  Voiding without difficulty.   Up in room with steady gait.   Encouraged to call with any questions/concerns.  
Patient has stable vitals.  Fundus firm at U/1. Scant rubra flow.  No clots or free flow noted.  Planning to discharge this evening at 2000.  Discharge instructions reviewed and all questions answered.  Will followup in clinic in 2 to 6 weeks per orders and will pickup ordered home meds at [facility].    
RN continuously at bedside, monitoring fetal status, maternal pushing efforts, and progress of pushing.  of viable Male infant. See delivery summary for details.     
Transferred to [facility] room 404 via wheelchair. Baby transferred via mothers arms. Accompanied by Registered Nurse. Personal belongings moved with patient to [facility]. Oriented patient to room, surroundings and call light - that is within reach of patient. Report given to VANITA Fam. Fundus and lochia checked with receiving RN. Patient tolerated transfer and is stable. ID bands double-checked with receiving RN    
Vital signs stable, afebrile, voiding well, ice and tucks to perineum prn, FFU/1 scant rubra lochia, able to ambulate without dizziness, able to rest, pain well controlled with medications, rates her pain 3/10, breast feeding  skin-to-skin on demand and bottle feeding Sim.  is here and is supportive.   Reminded parents again about hospital policy regarding bedsharing with .      
Vital signs stable. Patient voiding without difficulty. Able to ambulate in room free of dizziness. Taking tylenol/ibuprofen for pain management. Working on breastfeeding infant every 2-3 hours ad bottling formula. Encouraged to call with questions/concerns. Will continue to monitor.   
Vital signs stable. Postpartum assessment WDL. Patient voiding without difficulty. Able to ambulate in room free of dizziness. Pt denies pain and declines pain medication. Working on breastfeeding infant every 2-3 hours, and bottling formula. Encouraged to call with questions/concerns. Will continue to monitor.   
Nutrition Education

## 2021-07-26 ENCOUNTER — VIRTUAL VISIT (OUTPATIENT)
Dept: MIDWIFE SERVICES | Facility: CLINIC | Age: 26
End: 2021-07-26
Payer: COMMERCIAL

## 2021-07-26 DIAGNOSIS — R10.2 PELVIC PAIN IN FEMALE: ICD-10-CM

## 2021-07-26 DIAGNOSIS — Z30.09 BIRTH CONTROL COUNSELING: ICD-10-CM

## 2021-07-26 DIAGNOSIS — Z30.011 BCP (BIRTH CONTROL PILLS) INITIATION: ICD-10-CM

## 2021-07-26 PROCEDURE — 99024 POSTOP FOLLOW-UP VISIT: CPT | Performed by: ADVANCED PRACTICE MIDWIFE

## 2021-07-26 RX ORDER — ACETAMINOPHEN AND CODEINE PHOSPHATE 120; 12 MG/5ML; MG/5ML
0.35 SOLUTION ORAL DAILY
Qty: 30 TABLET | Refills: 0 | Status: SHIPPED | OUTPATIENT
Start: 2021-07-26 | End: 2022-05-18

## 2021-07-26 NOTE — PROGRESS NOTES
"Kd Garcia is a 25 year old female who is being evaluated via a billable telephone visit.      What phone number would you like to be contacted at? [phone]  How would you like to obtain your AVS? Madelinehart      SUBJECTIVE:                                                   Kd Garcia is a 25 year old female who presents for virtual visit today for the following health issue(s):  2 week PP E-visit.    HPI:      Midwife Postpartum 2 Week E-Visit    Kd Garcia is a 25 year old here for a 2 week postpartum billable phone visit/checkup.     Delivery date was 07/10/21. She had a  of a viable boy, named Loc, weight 7 pounds 10.8 oz., with none complications      Since delivery, she has been breast feeding and bottle feeding with formula.  She has not had any signs of infection, her lochia is still ongoing.  She has had some pelvic discomfort when walking, feels sore. Also experiencing when she \"squats down to pick something up, like something is there, like a blood clot that has to come out.\" When asked if she felt a sense of pressure or heaviness she stated, yes.     She is voiding and having bowel movements without difficulty.       Contraception was discussed and patient desires unsure. Still thinking about her options. Open to beginning POP and further discussing other options at her 6 week visit.   She has not had intercourse since delivery.      Mood is Stable Getting enough sleep       ROS:  12 point review of systems negative other than symptoms noted below or in the HPI.       Current Outpatient Medications:      norethindrone (MICRONOR) 0.35 MG tablet, Take 1 tablet (0.35 mg) by mouth daily, Disp: 30 tablet, Rfl: 0     valACYclovir (VALTREX) 1000 mg tablet, Take 2 tablets (2,000 mg) by mouth 2 times daily, Disp: 4 tablet, Rfl: 1     ferrous gluconate (FERGON) 324 (38 Fe) MG tablet, Take 1 tablet (324 mg) by mouth daily (with breakfast) for 14 days (Patient not taking: Reported on 2021), " Disp: 14 tablet, Rfl: 0     lanolin ointment, Apply topically every hour as needed for dry skin (sore nipples) (Patient not taking: Reported on 2021), Disp: , Rfl:      Prenatal Vit-Fe Fumarate-FA (PRENATAL VITAMIN AND MINERAL PO), , Disp: , Rfl: .   OB History    Para Term  AB Living   7 4 4 0 3 4   SAB TAB Ectopic Multiple Live Births   2 1 0 0 4      # Outcome Date GA Lbr Josh/2nd Weight Sex Delivery Anes PTL Lv   7 Term 07/10/21 39w5d 08:10 / 00:11 3.48 kg (7 lb 10.8 oz) M Vag-Spont None N MARGARET      Name: HOWIEMALE-KD AYALA      Apgar1: 8  Apgar5: 9   6 Term 19 38w5d 06:10 / 00:11 2.9 kg (6 lb 6.3 oz) F Vag-Spont None N MARGARET      Name: Everlyn      Apgar1: 8  Apgar5: 9   5 Term 17 40w0d 04:18 / 00:05 3.175 kg (7 lb) F Vag-Spont None N MARGARET      Name: HOWIEBABY1 PA      Apgar1: 8  Apgar5: 9   4 SAB 2016     SAB      3 SAB 2016     SAB      2 TAB 2015     TAB      1 Term 12 38w4d 05:42 / 00:09 2.92 kg (6 lb 7 oz) M Vag-Spont Local N MARGARET      Name: JUSTIN GARCIA PA      Apgar1: 9  Apgar5: 9     Last pap:    Lab Results   Component Value Date    PAP ASC-US 2016     Kd Schultzleonor Garcia is a 25 year old female who presented with spontaneous onset of labor and +GBS status.  Her labor progressed quickly and she had SVB viable infant boy.  She received 1 dose of IV antibiotics approximately 3 hours prior to giving birth.  Her PP recovery has been unremarkable except for anemia.  She continued an oral iron regimen for a few days after birth and then stopped. She denies symptoms anemia.     Last HgB:   Hemoglobin   Date Value Ref Range Status   2021 9.7 (L) 11.7 - 15.7 g/dL Final        Patient's last menstrual period was 2020 (approximate).     Patient is not sexually active, .  Using none for contraception.    reports that she has quit smoking. She smoked 0.00 packs per day. She has never used smokeless tobacco.  Health maintenance updated:        Patient Active Problem  List   Diagnosis     Papanicolaou smear of cervix with atypical squamous cells of undetermined significance (ASC-US)     Supervision of normal intrauterine pregnancy in multigravida in third trimester     Pyelectasis of fetus on prenatal ultrasound     H/O cold sores     Positive fetal fibronectin at 22 weeks to 34 weeks gestation     Constipation during pregnancy in third trimester     Indication for care in labor and delivery, antepartum      (normal spontaneous vaginal delivery)     Lactating mother     Anemia due to blood loss, acute     Past Surgical History:   Procedure Laterality Date     HC OPEN RX DISTAL RADIUS FX, EXTRA-ARTICULAR      left arm fx     WISDOM TOOTH EXTRACTION        Social History     Tobacco Use     Smoking status: Former Smoker     Packs/day: 0.00     Smokeless tobacco: Never Used   Substance Use Topics     Alcohol use: Not Currently      Problem (# of Occurrences) Relation (Name,Age of Onset)    C.A.D. (1) Father (70): MI    Cerebrovascular Disease (1) Father    Coronary Artery Disease (1) Father    Hypertension (1) Father    Unknown/Adopted (1) Mother       Negative family history of: Diabetes, Hyperlipidemia, Breast Cancer, Colon Cancer, Prostate Cancer, Other Cancer, Depression, Anxiety Disorder, Mental Illness, Substance Abuse, Anesthesia Reaction, Asthma, Osteoporosis, Genetic Disorder, Thyroid Disease, Obesity            Allergies   Allergen Reactions     Reglan [Metoclopramide] Anxiety         OBJECTIVE:     No vitals were obtained today due to virtual visit.      ASSESSMENT/PLAN:                                                      Phone call duration: 12 minutes      ICD-10-CM    1. Encounter for postpartum visit  Z39.2    2. Birth control counseling  Z30.09    3. BCP (birth control pills) initiation  Z30.011 norethindrone (MICRONOR) 0.35 MG tablet   4. Pelvic pain in female  R10.2          Discussed pelvic floor therapy. Wants to wait and discuss further at 6 week PP  visit. Will see if pain continues or subsides between then.    Prescription sent for Micronor x 1 month. Will discuss other options at 6 wk PPV. Birth control counseling provided.   Hgb check at 6 weeks    RTC in 4 weeks or sooner with concerns.     PETER Bronson CNM  [facility]

## 2021-08-20 ENCOUNTER — IMMUNIZATION (OUTPATIENT)
Dept: NURSING | Facility: CLINIC | Age: 26
End: 2021-08-20
Payer: COMMERCIAL

## 2021-08-20 PROCEDURE — 91300 PR COVID VAC PFIZER DIL RECON 30 MCG/0.3 ML IM: CPT | Performed by: FAMILY MEDICINE

## 2021-08-20 PROCEDURE — 0001A PR COVID VAC PFIZER DIL RECON 30 MCG/0.3 ML IM: CPT | Performed by: FAMILY MEDICINE

## 2021-08-23 PROBLEM — O35.EXX0 PYELECTASIS OF FETUS ON PRENATAL ULTRASOUND: Status: RESOLVED | Noted: 2021-04-01 | Resolved: 2021-08-23

## 2021-08-23 PROBLEM — O09.899 POSITIVE FETAL FIBRONECTIN AT 22 WEEKS TO 34 WEEKS GESTATION: Status: RESOLVED | Noted: 2021-06-06 | Resolved: 2021-08-23

## 2021-08-23 PROBLEM — R87.89 POSITIVE FETAL FIBRONECTIN AT 22 WEEKS TO 34 WEEKS GESTATION: Status: RESOLVED | Noted: 2021-06-06 | Resolved: 2021-08-23

## 2021-09-05 ENCOUNTER — HEALTH MAINTENANCE LETTER (OUTPATIENT)
Age: 26
End: 2021-09-05

## 2021-09-10 ENCOUNTER — IMMUNIZATION (OUTPATIENT)
Dept: NURSING | Facility: CLINIC | Age: 26
End: 2021-09-10
Attending: FAMILY MEDICINE
Payer: COMMERCIAL

## 2021-09-10 PROCEDURE — 0002A PR COVID VAC PFIZER DIL RECON 30 MCG/0.3 ML IM: CPT

## 2021-09-10 PROCEDURE — 91300 PR COVID VAC PFIZER DIL RECON 30 MCG/0.3 ML IM: CPT

## 2021-09-24 ENCOUNTER — IMMUNIZATION (OUTPATIENT)
Dept: FAMILY MEDICINE | Facility: CLINIC | Age: 26
End: 2021-09-24
Payer: COMMERCIAL

## 2021-09-24 PROCEDURE — 90471 IMMUNIZATION ADMIN: CPT

## 2021-09-24 PROCEDURE — 90686 IIV4 VACC NO PRSV 0.5 ML IM: CPT

## 2021-10-26 ENCOUNTER — MYC MEDICAL ADVICE (OUTPATIENT)
Dept: MIDWIFE SERVICES | Facility: CLINIC | Age: 26
End: 2021-10-26

## 2021-10-26 DIAGNOSIS — B00.2 ORAL HERPES SIMPLEX INFECTION: ICD-10-CM

## 2021-10-26 RX ORDER — VALACYCLOVIR HYDROCHLORIDE 1 G/1
2000 TABLET, FILM COATED ORAL 2 TIMES DAILY
Qty: 4 TABLET | Refills: 1 | Status: SHIPPED | OUTPATIENT
Start: 2021-10-26 | End: 2022-05-18

## 2022-03-03 ENCOUNTER — MYC MEDICAL ADVICE (OUTPATIENT)
Dept: MIDWIFE SERVICES | Facility: CLINIC | Age: 27
End: 2022-03-03
Payer: COMMERCIAL

## 2022-03-03 ENCOUNTER — MYC REFILL (OUTPATIENT)
Dept: MIDWIFE SERVICES | Facility: CLINIC | Age: 27
End: 2022-03-03
Payer: COMMERCIAL

## 2022-03-03 DIAGNOSIS — Z30.011 BCP (BIRTH CONTROL PILLS) INITIATION: ICD-10-CM

## 2022-03-03 RX ORDER — ACETAMINOPHEN AND CODEINE PHOSPHATE 120; 12 MG/5ML; MG/5ML
0.35 SOLUTION ORAL DAILY
Qty: 30 TABLET | Refills: 0 | OUTPATIENT
Start: 2022-03-03

## 2022-03-03 NOTE — TELEPHONE ENCOUNTER
"Requested Prescriptions   Pending Prescriptions Disp Refills     norethindrone (MICRONOR) 0.35 MG tablet 30 tablet 0     Sig: Take 1 tablet (0.35 mg) by mouth daily       Contraceptives Protocol Passed - 3/3/2022 12:23 PM        Passed - Patient is not a current smoker if age is 35 or older        Passed - Recent (12 mo) or future (30 days) visit within the authorizing provider's specialty     Patient has had an office visit with the authorizing provider or a provider within the authorizing providers department within the previous 12 mos or has a future within next 30 days. See \"Patient Info\" tab in inbasket, or \"Choose Columns\" in Meds & Orders section of the refill encounter.              Passed - Medication is active on med list        Passed - No active pregnancy on record        Passed - No positive pregnancy test in past 12 months           Refill denied  Appointment needed for further refills    Raiza Ruff RN on 3/3/2022 at 12:27 PM    " Billing Type: Third-Party Bill
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
## 2022-03-16 ENCOUNTER — OFFICE VISIT (OUTPATIENT)
Dept: MIDWIFE SERVICES | Facility: CLINIC | Age: 27
End: 2022-03-16
Payer: COMMERCIAL

## 2022-03-16 VITALS
SYSTOLIC BLOOD PRESSURE: 112 MMHG | HEART RATE: 64 BPM | BODY MASS INDEX: 31.02 KG/M2 | WEIGHT: 158 LBS | HEIGHT: 60 IN | DIASTOLIC BLOOD PRESSURE: 68 MMHG

## 2022-03-16 DIAGNOSIS — Z30.09 COUNSELING FOR INITIATION OF BIRTH CONTROL METHOD: ICD-10-CM

## 2022-03-16 DIAGNOSIS — Z30.011 BCP (BIRTH CONTROL PILLS) INITIATION: Primary | ICD-10-CM

## 2022-03-16 PROCEDURE — 99214 OFFICE O/P EST MOD 30 MIN: CPT | Performed by: ADVANCED PRACTICE MIDWIFE

## 2022-03-16 RX ORDER — NORGESTIMATE AND ETHINYL ESTRADIOL 0.25-0.035
1 KIT ORAL DAILY
Qty: 90 TABLET | Refills: 0 | Status: SHIPPED | OUTPATIENT
Start: 2022-03-16 | End: 2022-05-18

## 2022-03-16 RX ORDER — ASCORBIC ACID 500 MG
TABLET ORAL
COMMUNITY
Start: 2021-07-11 | End: 2022-05-18

## 2022-03-16 NOTE — PROGRESS NOTES
SUBJECTIVE:                                                   Pa Kev Garcia is a 26 year old who presents to clinic today for the following health issue(s):  Patient presents with:  Other: BC      HPI:  Here to discuss beginning CHC pills. Currently using condoms. Had been using a POP pill after her delivery in , but was only on it for a few months.     Patient's last menstrual period was 03/15/2022. Regular  Menstrual History: frequency: every 34-36 days  Patient is sexually active  .  Using condoms currently for birth control  Health maintenance updated:  Yes  STI infx testing offered:  Declined    Last PHQ-9 score on record =   PHQ-9 SCORE 2021   PHQ-9 Total Score -   PHQ-9 Total Score MyChart -   PHQ-9 Total Score 0     Last GAD7 score on record =   RACHELLE-7 SCORE 2020   Total Score 0 (minimal anxiety)   Total Score 0     Alcohol Score = 0    Problem list and histories reviewed & adjusted, as indicated.  Additional history: as documented.    Patient Active Problem List   Diagnosis     Papanicolaou smear of cervix with atypical squamous cells of undetermined significance (ASC-US)     Supervision of normal intrauterine pregnancy in multigravida in third trimester     H/O cold sores     Constipation during pregnancy in third trimester      (normal spontaneous vaginal delivery)     Lactating mother     Anemia due to blood loss, acute     Past Surgical History:   Procedure Laterality Date     HC OPEN RX DISTAL RADIUS FX, EXTRA-ARTICULAR      left arm fx     WISDOM TOOTH EXTRACTION        Social History     Tobacco Use     Smoking status: Former Smoker     Packs/day: 0.00     Smokeless tobacco: Never Used   Substance Use Topics     Alcohol use: Not Currently      Problem (# of Occurrences) Relation (Name,Age of Onset)    C.A.D. (1) Father (70): MI    Cerebrovascular Disease (1) Father    Coronary Artery Disease (1) Father    Hypertension (1) Father    Unknown/Adopted (1) Mother        Negative family history of: Diabetes, Hyperlipidemia, Breast Cancer, Colon Cancer, Prostate Cancer, Other Cancer, Depression, Anxiety Disorder, Mental Illness, Substance Abuse, Anesthesia Reaction, Asthma, Osteoporosis, Genetic Disorder, Thyroid Disease, Obesity            Current Outpatient Medications   Medication Sig     norgestimate-ethinyl estradiol (SPRINTEC 28) 0.25-35 MG-MCG tablet Take 1 tablet by mouth daily     vitamin C (ASCORBIC ACID) 500 MG tablet      norethindrone (MICRONOR) 0.35 MG tablet Take 1 tablet (0.35 mg) by mouth daily (Patient not taking: Reported on 3/16/2022)     valACYclovir (VALTREX) 1000 mg tablet Take 2 tablets (2,000 mg) by mouth 2 times daily     No current facility-administered medications for this visit.     Allergies   Allergen Reactions     Reglan [Metoclopramide] Anxiety       ROS:  12 point review of systems negative other than symptoms noted below or in the HPI.  12 point review of systems negative other than symptoms noted below.    OBJECTIVE:     /68   Pulse 64   Ht 1.524 m (5')   Wt 71.7 kg (158 lb)   LMP 03/15/2022   BMI 30.86 kg/m    Body mass index is 30.86 kg/m .    PHYSICAL EXAM:  Deferred         ASSESSMENT/PLAN:                                                        ICD-10-CM    1. BCP (birth control pills) initiation  Z30.011 norgestimate-ethinyl estradiol (SPRINTEC 28) 0.25-35 MG-MCG tablet   2. Counseling for initiation of birth control method  Z30.09        COUNSELING:  OCP counseling provided, is a candidate for CHC methods.   Prescription sent for Sprintec X 3 months, no refill. Explained proper initiation and dosing.  Due for annual exam. Will reevaluate birth control method at annual exam in ~2-3 months. Due for Pap  ACHES warning reviewed and when to call.   Reviewed common SE when on birth control and risks. AVS provided    20 minutes spent on the date of the encounter doing chart review, patient visit and documentation     Mabel GREEN  CNM

## 2022-03-16 NOTE — PATIENT INSTRUCTIONS
Birth Control Pills    Combination birth control pills contain both estrogen and progestin.  There are numerous brands of birth control pills otherwise known as oral contraceptive pills (OCP's).  Each brand has a different combination of estrogen and progestin so every woman can find the one that is right for her.  OCP's are a safe and effective way to prevent pregnancy in most women.    How do OCP's work  OCP's work by several different mechanisms.  They cause changes in the cervix and the lining of the uterus.  The cervical mucus becomes thicker which will prevent the sperm from entering the cervix.  The lining of the uterus becomes thin which helps prevent an egg from attaching to it.  In combination, these events make it unlikely that you will get pregnant. It may also prevent ovulation completely.    Benefits of OCP's  May reduce your risk of:  Cancer of the uterus and ovary, ovarian cysts, pelvic infection, bone loss, benign breast disease, anemia, ectopic pregnancy and acne.  It may also decrease symptoms of PCOS (Polycystic Ovarian Syndrome). OCP's may also improve cramping during menstrual cycle and may make you cycle shorter and lighter.    How to take OCP's  You have several choices on how to start taking your OCP's:    You can start the pill on the first day of your next period    You can start the pill on the Sunday after your next period starts    You can start the pill on the first day it was prescribed no matter where you are in your cycle.  In this case, you will need to make sure you are not pregnant.    No matter when you start your first pack, you will always start your next pack on the same day you started your first pack.    You should take the pill at the same time every day.  Do not skip any pills.  If you miss any pills, are taking antibiotics or vomit, use a backup method of birth control until you get your next period.    Pills come in packs of 21, 28 or 91 pills:      21 Pills:  Take one  pill at the same time every day for 21 days.  Wait 7 days before beginning your next pack.  During these 7 days you will have your period.    28 Pills:  Take one pill at the same time every day for 28 days.  The last 7 pills in the pack do not contain estrogen/progestin.  During these 7 days you will have your period.      91 Pills:  Take one pill at the same time every day for 91 days.  The last 7 pills in the pack do not contain estrogen/progestin.  During these 7 days you will have your period.  With this method you will only have 4 periods a year.  Some women eventually have no bleeding at all.    Each pill pack comes with instructions.  Please make sure you read them and understand these instructions.      What to do if you miss a pill    Occasionally you may forget to take a pill or not take it on time.  Take the missed pill as soon as you remember.  Take the next pill at the regular time.  It is ok if you take two pills in one day.  You may feel a bit queasy or have some spotting, this is normal and should not be concerning.  If you have missed more than one pill use a back up method of birth control and call the clinic for instructions on how to proceed.    Who should not take Combined OCP's    If you have a history or have blood clots    A history of cerebral vascular accident (stroke)    If you have ischemic heart or coronary artery disease    Known of suspected breast cancer    Known or suspected pregnancy    Smoker and over age 35    Any know liver abnormality    Migraine headaches with an aura    Undiagnosed abnormal vaginal bleeding    High blood pressure    Common side effects when starting OCP's  Headache, nausea, dizziness, breakthrough bleeding, missed periods, tender breasts, depression and anxiety.  Most side effects are minor and resolve in the first few months. Take the pill with meals or at bedtime if nausea occurs.    Call or return for care in the following circumstances:      Unexpected  missed periods or very heavy bleeding    Persistent vaginal bleeding    Depression    Suspected pregnancy    Persistent side effects such as:  Nausea, irregular menses or mood changes.    Seek emergency care immediately for the following:  ACHES    Abdominal or pelvic pain    Chest pain    Severe headache     Visual disturbances    Severe leg pain or numbness or tingling of extremities    Lastly-    Use of a backup method is recommended for the first cycle    Condoms are recommended to protect against STI's    OCP's are 99% effective if take correctly.    The pill helps to keep your periods regular, lighter and shorter and reduces cramps.  If you desire a pregnancy, you may stop taking your OCPs.     Please call the clinic with questions and concerns  [facility]  [phone]

## 2022-03-18 ENCOUNTER — TELEPHONE (OUTPATIENT)
Dept: MIDWIFE SERVICES | Facility: CLINIC | Age: 27
End: 2022-03-18
Payer: COMMERCIAL

## 2022-03-18 DIAGNOSIS — Z32.00 ENCOUNTER FOR PREGNANCY TEST, RESULT UNKNOWN: Primary | ICD-10-CM

## 2022-03-18 NOTE — TELEPHONE ENCOUNTER
Had a medical  on 22 at 8 weeks gestation through an online medical company. She had significant bleeding and passing clots after the 2nd dose of pills. She never took a pregnancy test after.     On 3/16 she came into our office for a birth control consult. She has started bleeding again on 3/15. The bleeding stopped yesterday; was light overall. Today she took an at home pregnancy test before she was to start her OCPs and the test have positive. She states it was a very dark positive.    She is unsure if she wants to continue with this pregnancy or have a medical .     We discussed that at this time I am unsure on the dating of this pregnancy. We discussed the options of have an HCG level drawn at [facility] vs going to [facility] for a consultation on her options. She is going to try to contact [facility] first. I have placed an order for an HCG level. She will come in for the lab value if needed.     Elisabeth Lozano, DNP, APRN, CNM

## 2022-03-22 ENCOUNTER — LAB (OUTPATIENT)
Dept: LAB | Facility: CLINIC | Age: 27
End: 2022-03-22
Payer: COMMERCIAL

## 2022-03-22 DIAGNOSIS — Z32.00 ENCOUNTER FOR PREGNANCY TEST, RESULT UNKNOWN: ICD-10-CM

## 2022-03-22 DIAGNOSIS — O03.4 INCOMPLETE ABORTION WITHOUT COMPLICATIONS: Primary | ICD-10-CM

## 2022-03-22 LAB — HCG SERPL-ACNC: 88 MLU/ML (ref 0–4)

## 2022-03-22 PROCEDURE — 84702 CHORIONIC GONADOTROPIN TEST: CPT

## 2022-03-22 PROCEDURE — 36415 COLL VENOUS BLD VENIPUNCTURE: CPT

## 2022-03-24 ENCOUNTER — LAB (OUTPATIENT)
Dept: LAB | Facility: CLINIC | Age: 27
End: 2022-03-24
Payer: COMMERCIAL

## 2022-03-24 DIAGNOSIS — O03.4 INCOMPLETE ABORTION WITHOUT COMPLICATIONS: ICD-10-CM

## 2022-03-24 LAB — HCG SERPL-ACNC: 55 MLU/ML (ref 0–4)

## 2022-03-24 PROCEDURE — 36415 COLL VENOUS BLD VENIPUNCTURE: CPT

## 2022-03-24 PROCEDURE — 84702 CHORIONIC GONADOTROPIN TEST: CPT

## 2022-03-25 DIAGNOSIS — O03.4 INCOMPLETE ABORTION WITHOUT COMPLICATIONS: Primary | ICD-10-CM

## 2022-05-18 ENCOUNTER — OFFICE VISIT (OUTPATIENT)
Dept: FAMILY MEDICINE | Facility: CLINIC | Age: 27
End: 2022-05-18
Payer: COMMERCIAL

## 2022-05-18 VITALS
WEIGHT: 155.25 LBS | BODY MASS INDEX: 30.48 KG/M2 | OXYGEN SATURATION: 98 % | DIASTOLIC BLOOD PRESSURE: 60 MMHG | RESPIRATION RATE: 16 BRPM | SYSTOLIC BLOOD PRESSURE: 106 MMHG | TEMPERATURE: 98.1 F | HEART RATE: 76 BPM | HEIGHT: 60 IN

## 2022-05-18 DIAGNOSIS — Z91.89 AT RISK FOR DIABETES MELLITUS: ICD-10-CM

## 2022-05-18 DIAGNOSIS — D62 ANEMIA DUE TO BLOOD LOSS, ACUTE: ICD-10-CM

## 2022-05-18 DIAGNOSIS — Z30.011 BCP (BIRTH CONTROL PILLS) INITIATION: ICD-10-CM

## 2022-05-18 DIAGNOSIS — R20.2 NUMBNESS AND TINGLING OF BOTH LOWER EXTREMITIES: ICD-10-CM

## 2022-05-18 DIAGNOSIS — Z11.1 SCREENING EXAMINATION FOR PULMONARY TUBERCULOSIS: ICD-10-CM

## 2022-05-18 DIAGNOSIS — Z12.4 CERVICAL CANCER SCREENING: ICD-10-CM

## 2022-05-18 DIAGNOSIS — R20.0 NUMBNESS OF TOES: ICD-10-CM

## 2022-05-18 DIAGNOSIS — R20.0 NUMBNESS AND TINGLING OF BOTH LOWER EXTREMITIES: ICD-10-CM

## 2022-05-18 DIAGNOSIS — E66.811 CLASS 1 OBESITY WITHOUT SERIOUS COMORBIDITY WITH BODY MASS INDEX (BMI) OF 30.0 TO 30.9 IN ADULT, UNSPECIFIED OBESITY TYPE: ICD-10-CM

## 2022-05-18 DIAGNOSIS — Z11.3 SCREEN FOR STD (SEXUALLY TRANSMITTED DISEASE): ICD-10-CM

## 2022-05-18 DIAGNOSIS — Z00.00 ROUTINE GENERAL MEDICAL EXAMINATION AT A HEALTH CARE FACILITY: Primary | ICD-10-CM

## 2022-05-18 LAB
CHOLEST SERPL-MCNC: 173 MG/DL
ERYTHROCYTE [DISTWIDTH] IN BLOOD BY AUTOMATED COUNT: 12.3 % (ref 10–15)
FASTING STATUS PATIENT QL REPORTED: NO
HBA1C MFR BLD: 5.6 % (ref 0–5.6)
HCT VFR BLD AUTO: 38.4 % (ref 35–47)
HDLC SERPL-MCNC: 50 MG/DL
HGB BLD-MCNC: 12.6 G/DL (ref 11.7–15.7)
LDLC SERPL CALC-MCNC: 99 MG/DL
MAGNESIUM SERPL-MCNC: 1.8 MG/DL (ref 1.8–2.6)
MCH RBC QN AUTO: 28.4 PG (ref 26.5–33)
MCHC RBC AUTO-ENTMCNC: 32.8 G/DL (ref 31.5–36.5)
MCV RBC AUTO: 87 FL (ref 78–100)
PLATELET # BLD AUTO: 328 10E3/UL (ref 150–450)
RBC # BLD AUTO: 4.44 10E6/UL (ref 3.8–5.2)
TRIGL SERPL-MCNC: 118 MG/DL
TSH SERPL DL<=0.005 MIU/L-ACNC: 1.11 UIU/ML (ref 0.3–5)
WBC # BLD AUTO: 7.5 10E3/UL (ref 4–11)

## 2022-05-18 PROCEDURE — 36415 COLL VENOUS BLD VENIPUNCTURE: CPT | Performed by: FAMILY MEDICINE

## 2022-05-18 PROCEDURE — 83735 ASSAY OF MAGNESIUM: CPT | Performed by: FAMILY MEDICINE

## 2022-05-18 PROCEDURE — 87591 N.GONORRHOEAE DNA AMP PROB: CPT | Performed by: FAMILY MEDICINE

## 2022-05-18 PROCEDURE — 83036 HEMOGLOBIN GLYCOSYLATED A1C: CPT | Performed by: FAMILY MEDICINE

## 2022-05-18 PROCEDURE — 84443 ASSAY THYROID STIM HORMONE: CPT | Performed by: FAMILY MEDICINE

## 2022-05-18 PROCEDURE — 86481 TB AG RESPONSE T-CELL SUSP: CPT | Performed by: FAMILY MEDICINE

## 2022-05-18 PROCEDURE — G0123 SCREEN CERV/VAG THIN LAYER: HCPCS | Performed by: FAMILY MEDICINE

## 2022-05-18 PROCEDURE — 85027 COMPLETE CBC AUTOMATED: CPT | Performed by: FAMILY MEDICINE

## 2022-05-18 PROCEDURE — 87491 CHLMYD TRACH DNA AMP PROBE: CPT | Performed by: FAMILY MEDICINE

## 2022-05-18 PROCEDURE — 99385 PREV VISIT NEW AGE 18-39: CPT | Performed by: FAMILY MEDICINE

## 2022-05-18 PROCEDURE — 80061 LIPID PANEL: CPT | Performed by: FAMILY MEDICINE

## 2022-05-18 PROCEDURE — 99213 OFFICE O/P EST LOW 20 MIN: CPT | Mod: 25 | Performed by: FAMILY MEDICINE

## 2022-05-18 RX ORDER — ACETAMINOPHEN AND CODEINE PHOSPHATE 120; 12 MG/5ML; MG/5ML
0.35 SOLUTION ORAL DAILY
Qty: 90 TABLET | Refills: 3 | Status: SHIPPED | OUTPATIENT
Start: 2022-05-18 | End: 2024-02-13

## 2022-05-18 NOTE — PROGRESS NOTES
SUBJECTIVE:   CC: Kd Angulo Jose is an 26 year old woman who presents for preventive health visit.     She would like to establish care.     Has concerns about numbness and tingling in her feet and hands, sometimes in her thighs, too. She used to sometimes drink excessively and would feel numbness and tingling in in these areas the day after drinking. It was uncomfortable, though not painful. 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     After having baby, 4th toe on right foot became numb. If she pushes on it, it feels numb. No injury, no excessive swelling of feet during pregnancy. Doesn't notice this as much anymore, just if she pushes on it or if one of her kids steps on it.     She feels like It's been harder to lose weight recently than it used to be for her.     Due for Pap.     Kids are 10, almost 4, almost 3, almost 1. She just had a medical  earlier this year. Does not want any more kids. Taking the pill (combined) but it makes her angry. She doesn't think that the progesterone-only pills made her feel that way. 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 Her  DOES want more kids, so they are not on the same page right now.       Patient has been advised of split billing requirements and indicates understanding: Yes  Healthy Habits:     Getting at least 3 servings of Calcium per day:  Yes    Bi-annual eye exam:  NO    Dental care twice a year:  NO    Sleep apnea or symptoms of sleep apnea:  None    Diet:  Regular (no restrictions)    Frequency of exercise:  1 day/week    Duration of exercise:  15-30 minutes    Taking medications regularly:  Yes    Medication side effects:  None    PHQ-2 Total Score: 0    Additional concerns today:   No      Today's PHQ-2 Score:   PHQ-2 ( 1999 Pfizer) 5/18/2022   Q1: Little interest or pleasure in doing things 0   Q2: Feeling down, depressed or hopeless 0   PHQ-2 Score 0   PHQ-2 Total Score (12-17 Years)- Positive if 3 or more points; Administer PHQ-A if positive -   Q1: Little interest or pleasure in doing things Not at all   Q2: Feeling down, depressed or hopeless Not at all   PHQ-2 Score 0       Abuse: Current or Past (Physical, Sexual or Emotional) - No  Do you feel safe in your environment? Yes    Have you ever done Advance Care Planning? (For example, a Health Directive, POLST, or a discussion with a medical provider or your loved ones about your wishes): No    Social History     Tobacco Use     Smoking status: Former Smoker     Packs/day: 0.00     Smokeless tobacco: Never Used   Substance Use Topics     Alcohol use: Not Currently     If you drink alcohol do you typically have >3 drinks per day or >7 drinks per week? Not applicable    Alcohol Use 5/18/2022   Prescreen: >3 drinks/day or >7 drinks/week? No   No flowsheet data found.      History of abnormal Pap smear: yes, 2016. Unsure whether she has had a pap since then.  PAP / HPV 5/25/2016   PAP (Historical) ASC-US(A)     Reviewed and updated as needed this visit by clinical staff   Tobacco  Allergies  Meds                Reviewed and updated as needed this visit by Provider                       Review of Systems  CONSTITUTIONAL: NEGATIVE for fever, chills, change in weight  INTEGUMENTARU/SKIN: NEGATIVE for worrisome rashes, moles or lesions  EYES: NEGATIVE for vision changes or irritation  ENT: NEGATIVE for ear, mouth and throat problems  RESP: NEGATIVE for significant cough or SOB  BREAST: NEGATIVE for masses, tenderness or discharge  CV: NEGATIVE for chest pain, palpitations or peripheral edema  GI: NEGATIVE for nausea, abdominal pain, heartburn, or change in bowel habits  : NEGATIVE for unusual urinary or vaginal symptoms. Periods are  regular.  MUSCULOSKELETAL: NEGATIVE for significant arthralgias or myalgia  NEURO: NEGATIVE for weakness, dizziness or paresthesias  PSYCHIATRIC: NEGATIVE for changes in mood or affect     OBJECTIVE:   /60   Pulse 76   Temp 98.1  F (36.7  C) (Oral)   Resp 16   Ht 1.524 m (5')   Wt 70.4 kg (155 lb 4 oz)   LMP 04/24/2022 (Exact Date)   SpO2 98%   Breastfeeding No   BMI 30.32 kg/m    Physical Exam  GENERAL: healthy, alert and no distress  EYES: Eyes grossly normal to inspection, PERRL and conjunctivae and sclerae normal  HENT: ear canals and TM's normal, nose and mouth without ulcers or lesions  NECK: no adenopathy, no asymmetry, masses, or scars and thyroid normal to palpation  RESP: lungs clear to auscultation - no rales, rhonchi or wheezes  BREAST: normal without masses, tenderness or nipple discharge and no palpable axillary masses or adenopathy  CV: regular rate and rhythm, normal S1 S2, no S3 or S4, no murmur, click or rub, no peripheral edema and peripheral pulses strong  ABDOMEN: soft, nontender, no hepatosplenomegaly, no masses and bowel sounds normal   (female): normal female external genitalia, normal urethral meatus, vaginal mucosa pink, moist, well rugated, and normal cervix/adnexa/uterus without masses or discharge  MS: no gross musculoskeletal defects noted, no edema  SKIN: no suspicious lesions or rashes  NEURO: Normal strength and tone, mentation intact and speech normal  PSYCH: mentation appears normal, affect normal/bright    Diagnostic Test Results:  Labs reviewed in Epic    ASSESSMENT/PLAN:   Kd Angulo was seen today for physical.    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 Hoping to finish in December!  -     Quantiferon TB  Gold Plus    Anemia due to blood loss, acute: last hgb was low at 9.7, recheck today  -     CBC with platelets    At risk for diabetes mellitus: at risk due to weight and ethnicity. Screening today  -     Hemoglobin A1c    Numbness and tingling of both lower extremities: Had CMP a couple of months ago that was normal. Will check Mag. Since this problem has mostly resolved and only occurred briefly during a time of stress, would assume it is due to anxiety. She is not too worried about it and is ok with watching and waiting without doing more workup.   -     Magnesium    Numbness of toe: I explained she may have a pinched or irritated nerve that began during her pregnancy, possibly due to foot swelling even if it was not severe. This is not very bothersome to her and she doesn't want to do any more workup or treatment.     Class 1 obesity without serious comorbidity with body mass index (BMI) of 30.0 to 30.9 in adult, unspecified obesity type  -     TSH with free T4 reflex; Future  -     TSH with free T4 reflex    BCP (birth control pills) initiation: will switch pills back to POP since she felt her mood was more stable on this than on the estrogen-containing pills. She will keep considering an IUD or nexplanon. We did discuss that POPs need to be taken at the same time every day.   -     norethindrone (MICRONOR) 0.35 MG tablet; Take 1 tablet (0.35 mg) by mouth daily    Screen for STD (sexually transmitted disease)  -     Chlamydia & Gonorrhea by PCR, [facility] - Clinic Collect    Other orders  -     REVIEW OF HEALTH MAINTENANCE PROTOCOL ORDERS  -     COVID-19,PF,PFIZER (12+ YRS)        Patient has been advised of split billing requirements and indicates understanding: Yes    COUNSELING:  Reviewed preventive health counseling, as reflected in patient instructions    Estimated body mass index is 30.32 kg/m  as calculated from the following:    Height as of this encounter: 1.524 m (5').    Weight as of this encounter:  70.4 kg (155 lb 4 oz).    Weight management plan: Discussed healthy diet and exercise guidelines    She reports that she has quit smoking. She smoked 0.00 packs per day. She has never used smokeless tobacco.      Counseling Resources:  ATP IV Guidelines  Pooled Cohorts Equation Calculator  Breast Cancer Risk Calculator  BRCA-Related Cancer Risk Assessment: FHS-7 Tool  FRAX Risk Assessment  ICSI Preventive Guidelines  Dietary Guidelines for Americans, 2010  USDA's MyPlate  ASA Prophylaxis  Lung CA Screening    Paulette Stacy MD  [facility]

## 2022-05-19 LAB
C TRACH DNA SPEC QL PROBE+SIG AMP: NEGATIVE
N GONORRHOEA DNA SPEC QL NAA+PROBE: NEGATIVE

## 2022-05-20 LAB
BKR LAB AP GYN ADEQUACY: NORMAL
BKR LAB AP GYN INTERPRETATION: NORMAL
BKR LAB AP HPV REFLEX: NORMAL
BKR LAB AP PREVIOUS ABNORMAL: NORMAL
GAMMA INTERFERON BACKGROUND BLD IA-ACNC: 0.17 IU/ML
M TB IFN-G BLD-IMP: NEGATIVE
M TB IFN-G CD4+ BCKGRND COR BLD-ACNC: 7.93 IU/ML
MITOGEN IGNF BCKGRD COR BLD-ACNC: -0.05 IU/ML
MITOGEN IGNF BCKGRD COR BLD-ACNC: -0.06 IU/ML
PATH REPORT.COMMENTS IMP SPEC: NORMAL
PATH REPORT.COMMENTS IMP SPEC: NORMAL
PATH REPORT.RELEVANT HX SPEC: NORMAL
QUANTIFERON MITOGEN: 8.1 IU/ML
QUANTIFERON NIL TUBE: 0.17 IU/ML
QUANTIFERON TB1 TUBE: 0.11 IU/ML
QUANTIFERON TB2 TUBE: 0.12

## 2022-05-26 ENCOUNTER — MYC MEDICAL ADVICE (OUTPATIENT)
Dept: FAMILY MEDICINE | Facility: CLINIC | Age: 27
End: 2022-05-26
Payer: COMMERCIAL

## 2022-05-26 DIAGNOSIS — B00.1 RECURRENT COLD SORES: Primary | ICD-10-CM

## 2022-05-27 RX ORDER — VALACYCLOVIR HYDROCHLORIDE 1 G/1
2000 TABLET, FILM COATED ORAL 2 TIMES DAILY
Qty: 4 TABLET | Refills: 0 | Status: SHIPPED | OUTPATIENT
Start: 2022-05-27 | End: 2022-10-11

## 2022-06-05 ENCOUNTER — HOSPITAL ENCOUNTER (OUTPATIENT)
Facility: HOSPITAL | Age: 27
Discharge: HOME | End: 2022-06-06
Attending: EMERGENCY MEDICINE | Admitting: EMERGENCY MEDICINE
Payer: COMMERCIAL

## 2022-06-05 ENCOUNTER — APPOINTMENT (OUTPATIENT)
Dept: ULTRASOUND IMAGING | Facility: HOSPITAL | Age: 27
End: 2022-06-05
Attending: EMERGENCY MEDICINE
Payer: COMMERCIAL

## 2022-06-05 DIAGNOSIS — O00.90 RUPTURED ECTOPIC PREGNANCY: ICD-10-CM

## 2022-06-05 LAB
ALBUMIN SERPL-MCNC: 3.7 G/DL (ref 3.5–5)
ALP SERPL-CCNC: 62 U/L (ref 45–120)
ALT SERPL W P-5'-P-CCNC: 10 U/L (ref 0–45)
ANION GAP SERPL CALCULATED.3IONS-SCNC: 6 MMOL/L (ref 5–18)
AST SERPL W P-5'-P-CCNC: 12 U/L (ref 0–40)
BASOPHILS # BLD AUTO: 0 10E3/UL (ref 0–0.2)
BASOPHILS NFR BLD AUTO: 0 %
BILIRUB DIRECT SERPL-MCNC: 0.2 MG/DL
BILIRUB SERPL-MCNC: 0.6 MG/DL (ref 0–1)
BUN SERPL-MCNC: 9 MG/DL (ref 8–22)
CALCIUM SERPL-MCNC: 8.3 MG/DL (ref 8.5–10.5)
CHLORIDE BLD-SCNC: 108 MMOL/L (ref 98–107)
CO2 SERPL-SCNC: 24 MMOL/L (ref 22–31)
CREAT SERPL-MCNC: 0.67 MG/DL (ref 0.6–1.1)
EOSINOPHIL # BLD AUTO: 0.2 10E3/UL (ref 0–0.7)
EOSINOPHIL NFR BLD AUTO: 2 %
ERYTHROCYTE [DISTWIDTH] IN BLOOD BY AUTOMATED COUNT: 13 % (ref 10–15)
GFR SERPL CREATININE-BSD FRML MDRD: >90 ML/MIN/1.73M2
GLUCOSE BLD-MCNC: 93 MG/DL (ref 70–125)
HCG SERPL-ACNC: 554 MLU/ML (ref 0–4)
HCT VFR BLD AUTO: 30.6 % (ref 35–47)
HGB BLD-MCNC: 9.8 G/DL (ref 11.7–15.7)
HOLD SPECIMEN: NORMAL
HOLD SPECIMEN: NORMAL
IMM GRANULOCYTES # BLD: 0 10E3/UL
IMM GRANULOCYTES NFR BLD: 0 %
LIPASE SERPL-CCNC: 12 U/L (ref 0–52)
LYMPHOCYTES # BLD AUTO: 2.1 10E3/UL (ref 0.8–5.3)
LYMPHOCYTES NFR BLD AUTO: 24 %
MCH RBC QN AUTO: 28.4 PG (ref 26.5–33)
MCHC RBC AUTO-ENTMCNC: 32 G/DL (ref 31.5–36.5)
MCV RBC AUTO: 89 FL (ref 78–100)
MONOCYTES # BLD AUTO: 0.7 10E3/UL (ref 0–1.3)
MONOCYTES NFR BLD AUTO: 8 %
NEUTROPHILS # BLD AUTO: 5.7 10E3/UL (ref 1.6–8.3)
NEUTROPHILS NFR BLD AUTO: 66 %
NRBC # BLD AUTO: 0 10E3/UL
NRBC BLD AUTO-RTO: 0 /100
PLATELET # BLD AUTO: 286 10E3/UL (ref 150–450)
POTASSIUM BLD-SCNC: 3.8 MMOL/L (ref 3.5–5)
PROT SERPL-MCNC: 6.9 G/DL (ref 6–8)
RBC # BLD AUTO: 3.45 10E6/UL (ref 3.8–5.2)
SODIUM SERPL-SCNC: 138 MMOL/L (ref 136–145)
WBC # BLD AUTO: 8.7 10E3/UL (ref 4–11)

## 2022-06-05 PROCEDURE — 84702 CHORIONIC GONADOTROPIN TEST: CPT | Performed by: EMERGENCY MEDICINE

## 2022-06-05 PROCEDURE — 96360 HYDRATION IV INFUSION INIT: CPT | Mod: 59

## 2022-06-05 PROCEDURE — 99285 EMERGENCY DEPT VISIT HI MDM: CPT | Mod: 25

## 2022-06-05 PROCEDURE — 85025 COMPLETE CBC W/AUTO DIFF WBC: CPT | Performed by: EMERGENCY MEDICINE

## 2022-06-05 PROCEDURE — 83690 ASSAY OF LIPASE: CPT | Performed by: STUDENT IN AN ORGANIZED HEALTH CARE EDUCATION/TRAINING PROGRAM

## 2022-06-05 PROCEDURE — 76801 OB US < 14 WKS SINGLE FETUS: CPT

## 2022-06-05 PROCEDURE — 36415 COLL VENOUS BLD VENIPUNCTURE: CPT | Performed by: EMERGENCY MEDICINE

## 2022-06-05 PROCEDURE — C9803 HOPD COVID-19 SPEC COLLECT: HCPCS

## 2022-06-05 PROCEDURE — 36415 COLL VENOUS BLD VENIPUNCTURE: CPT | Performed by: STUDENT IN AN ORGANIZED HEALTH CARE EDUCATION/TRAINING PROGRAM

## 2022-06-05 PROCEDURE — 82248 BILIRUBIN DIRECT: CPT | Performed by: EMERGENCY MEDICINE

## 2022-06-05 PROCEDURE — 83690 ASSAY OF LIPASE: CPT | Performed by: EMERGENCY MEDICINE

## 2022-06-05 PROCEDURE — 85041 AUTOMATED RBC COUNT: CPT | Performed by: STUDENT IN AN ORGANIZED HEALTH CARE EDUCATION/TRAINING PROGRAM

## 2022-06-05 PROCEDURE — 82248 BILIRUBIN DIRECT: CPT | Performed by: STUDENT IN AN ORGANIZED HEALTH CARE EDUCATION/TRAINING PROGRAM

## 2022-06-05 RX ORDER — MORPHINE SULFATE 4 MG/ML
4 INJECTION, SOLUTION INTRAMUSCULAR; INTRAVENOUS
Status: DISCONTINUED | OUTPATIENT
Start: 2022-06-05 | End: 2022-06-06 | Stop reason: HOSPADM

## 2022-06-05 RX ORDER — ONDANSETRON 2 MG/ML
4 INJECTION INTRAMUSCULAR; INTRAVENOUS EVERY 30 MIN PRN
Status: DISCONTINUED | OUTPATIENT
Start: 2022-06-05 | End: 2022-06-06 | Stop reason: HOSPADM

## 2022-06-05 ASSESSMENT — ENCOUNTER SYMPTOMS
DIARRHEA: 0
NAUSEA: 0
ABDOMINAL DISTENTION: 1
VOMITING: 0
CONSTIPATION: 0
ABDOMINAL PAIN: 1

## 2022-06-05 NOTE — ED TRIAGE NOTES
Pt reports diffuse abdominal pain that has been constant for the last three days. Pt denies any nausea, vomiting, or diarrhea. Pt states that her entire abdomen is tender and she feels bloated.      Triage Assessment     Row Name 06/05/22 0163       Triage Assessment (Adult)    Airway WDL WDL       Respiratory WDL    Respiratory WDL WDL       Skin Circulation/Temperature WDL    Skin Circulation/Temperature WDL WDL       Cardiac WDL    Cardiac WDL WDL       Peripheral/Neurovascular WDL    Peripheral Neurovascular WDL WDL       Cognitive/Neuro/Behavioral WDL    Cognitive/Neuro/Behavioral WDL WDL       Musa Coma Scale    Best Eye Response 4-->(E4) spontaneous    Best Motor Response 6-->(M6) obeys commands    Best Verbal Response 5-->(V5) oriented    Musa Coma Scale Score 15

## 2022-06-06 ENCOUNTER — ANESTHESIA EVENT (OUTPATIENT)
Dept: SURGERY | Facility: HOSPITAL | Age: 27
End: 2022-06-06
Payer: COMMERCIAL

## 2022-06-06 ENCOUNTER — ANESTHESIA (OUTPATIENT)
Dept: SURGERY | Facility: HOSPITAL | Age: 27
End: 2022-06-06
Payer: COMMERCIAL

## 2022-06-06 ENCOUNTER — HOSPITAL ENCOUNTER (OUTPATIENT)
Facility: HOSPITAL | Age: 27
End: 2022-06-06
Attending: OBSTETRICS & GYNECOLOGY | Admitting: OBSTETRICS & GYNECOLOGY
Payer: COMMERCIAL

## 2022-06-06 VITALS
HEART RATE: 97 BPM | WEIGHT: 150 LBS | TEMPERATURE: 98.6 F | SYSTOLIC BLOOD PRESSURE: 103 MMHG | BODY MASS INDEX: 29.45 KG/M2 | HEIGHT: 60 IN | DIASTOLIC BLOOD PRESSURE: 64 MMHG | RESPIRATION RATE: 20 BRPM | OXYGEN SATURATION: 97 %

## 2022-06-06 LAB
ABO/RH(D): NORMAL
ALBUMIN UR-MCNC: 100 MG/DL
ANTIBODY SCREEN: NEGATIVE
APPEARANCE UR: ABNORMAL
BACTERIA #/AREA URNS HPF: ABNORMAL /HPF
BILIRUB UR QL STRIP: NEGATIVE
COLOR UR AUTO: ABNORMAL
GLUCOSE UR STRIP-MCNC: NEGATIVE MG/DL
HGB BLD-MCNC: 7.6 G/DL (ref 11.7–15.7)
HGB UR QL STRIP: ABNORMAL
KETONES UR STRIP-MCNC: 60 MG/DL
LEUKOCYTE ESTERASE UR QL STRIP: ABNORMAL
MUCOUS THREADS #/AREA URNS LPF: PRESENT /LPF
NITRATE UR QL: NEGATIVE
PH UR STRIP: 6 [PH] (ref 5–7)
RBC URINE: >182 /HPF
SARS-COV-2 RNA RESP QL NAA+PROBE: NEGATIVE
SP GR UR STRIP: 1.03 (ref 1–1.03)
SPECIMEN EXPIRATION DATE: NORMAL
SQUAMOUS EPITHELIAL: 10 /HPF
UROBILINOGEN UR STRIP-MCNC: <2 MG/DL
WBC URINE: 0 /HPF

## 2022-06-06 PROCEDURE — 250N000011 HC RX IP 250 OP 636: Performed by: ANESTHESIOLOGY

## 2022-06-06 PROCEDURE — 88305 TISSUE EXAM BY PATHOLOGIST: CPT | Mod: TC | Performed by: OBSTETRICS & GYNECOLOGY

## 2022-06-06 PROCEDURE — 86901 BLOOD TYPING SEROLOGIC RH(D): CPT | Performed by: EMERGENCY MEDICINE

## 2022-06-06 PROCEDURE — 258N000003 HC RX IP 258 OP 636: Performed by: NURSE ANESTHETIST, CERTIFIED REGISTERED

## 2022-06-06 PROCEDURE — 250N000011 HC RX IP 250 OP 636: Performed by: NURSE ANESTHETIST, CERTIFIED REGISTERED

## 2022-06-06 PROCEDURE — 250N000025 HC SEVOFLURANE, PER MIN: Performed by: OBSTETRICS & GYNECOLOGY

## 2022-06-06 PROCEDURE — 250N000009 HC RX 250: Performed by: NURSE ANESTHETIST, CERTIFIED REGISTERED

## 2022-06-06 PROCEDURE — 88305 TISSUE EXAM BY PATHOLOGIST: CPT | Mod: 26 | Performed by: PATHOLOGY

## 2022-06-06 PROCEDURE — 258N000003 HC RX IP 258 OP 636: Performed by: EMERGENCY MEDICINE

## 2022-06-06 PROCEDURE — 272N000001 HC OR GENERAL SUPPLY STERILE: Performed by: OBSTETRICS & GYNECOLOGY

## 2022-06-06 PROCEDURE — 258N000001 HC RX 258: Performed by: OBSTETRICS & GYNECOLOGY

## 2022-06-06 PROCEDURE — 36415 COLL VENOUS BLD VENIPUNCTURE: CPT | Performed by: OBSTETRICS & GYNECOLOGY

## 2022-06-06 PROCEDURE — 81001 URINALYSIS AUTO W/SCOPE: CPT | Performed by: EMERGENCY MEDICINE

## 2022-06-06 PROCEDURE — 36415 COLL VENOUS BLD VENIPUNCTURE: CPT | Performed by: EMERGENCY MEDICINE

## 2022-06-06 PROCEDURE — 85018 HEMOGLOBIN: CPT | Performed by: OBSTETRICS & GYNECOLOGY

## 2022-06-06 PROCEDURE — 710N000012 HC RECOVERY PHASE 2, PER MINUTE: Performed by: OBSTETRICS & GYNECOLOGY

## 2022-06-06 PROCEDURE — 360N000077 HC SURGERY LEVEL 4, PER MIN: Performed by: OBSTETRICS & GYNECOLOGY

## 2022-06-06 PROCEDURE — 86850 RBC ANTIBODY SCREEN: CPT | Performed by: EMERGENCY MEDICINE

## 2022-06-06 PROCEDURE — 370N000017 HC ANESTHESIA TECHNICAL FEE, PER MIN: Performed by: OBSTETRICS & GYNECOLOGY

## 2022-06-06 PROCEDURE — 250N000011 HC RX IP 250 OP 636: Performed by: OBSTETRICS & GYNECOLOGY

## 2022-06-06 PROCEDURE — 710N000009 HC RECOVERY PHASE 1, LEVEL 1, PER MIN: Performed by: OBSTETRICS & GYNECOLOGY

## 2022-06-06 PROCEDURE — 87635 SARS-COV-2 COVID-19 AMP PRB: CPT | Performed by: EMERGENCY MEDICINE

## 2022-06-06 RX ORDER — OXYCODONE HYDROCHLORIDE 5 MG/1
5-10 TABLET ORAL EVERY 4 HOURS PRN
Qty: 12 TABLET | Refills: 0 | Status: SHIPPED | OUTPATIENT
Start: 2022-06-06 | End: 2024-02-13

## 2022-06-06 RX ORDER — PROPOFOL 10 MG/ML
INJECTION, EMULSION INTRAVENOUS PRN
Status: DISCONTINUED | OUTPATIENT
Start: 2022-06-06 | End: 2022-06-06

## 2022-06-06 RX ORDER — ONDANSETRON 2 MG/ML
INJECTION INTRAMUSCULAR; INTRAVENOUS PRN
Status: DISCONTINUED | OUTPATIENT
Start: 2022-06-06 | End: 2022-06-06

## 2022-06-06 RX ORDER — SODIUM CHLORIDE, SODIUM LACTATE, POTASSIUM CHLORIDE, CALCIUM CHLORIDE 600; 310; 30; 20 MG/100ML; MG/100ML; MG/100ML; MG/100ML
INJECTION, SOLUTION INTRAVENOUS CONTINUOUS
Status: DISCONTINUED | OUTPATIENT
Start: 2022-06-06 | End: 2022-06-06 | Stop reason: HOSPADM

## 2022-06-06 RX ORDER — ONDANSETRON 4 MG/1
4 TABLET, ORALLY DISINTEGRATING ORAL EVERY 30 MIN PRN
Status: DISCONTINUED | OUTPATIENT
Start: 2022-06-06 | End: 2022-06-06 | Stop reason: HOSPADM

## 2022-06-06 RX ORDER — SODIUM CHLORIDE, SODIUM LACTATE, POTASSIUM CHLORIDE, CALCIUM CHLORIDE 600; 310; 30; 20 MG/100ML; MG/100ML; MG/100ML; MG/100ML
INJECTION, SOLUTION INTRAVENOUS CONTINUOUS PRN
Status: DISCONTINUED | OUTPATIENT
Start: 2022-06-06 | End: 2022-06-06

## 2022-06-06 RX ORDER — ACETAMINOPHEN 325 MG/1
975 TABLET ORAL ONCE
Status: DISCONTINUED | OUTPATIENT
Start: 2022-06-06 | End: 2022-06-06 | Stop reason: HOSPADM

## 2022-06-06 RX ORDER — LIDOCAINE HYDROCHLORIDE 10 MG/ML
INJECTION, SOLUTION INFILTRATION; PERINEURAL PRN
Status: DISCONTINUED | OUTPATIENT
Start: 2022-06-06 | End: 2022-06-06

## 2022-06-06 RX ORDER — FENTANYL CITRATE 50 UG/ML
25 INJECTION, SOLUTION INTRAMUSCULAR; INTRAVENOUS EVERY 5 MIN PRN
Status: DISCONTINUED | OUTPATIENT
Start: 2022-06-06 | End: 2022-06-06 | Stop reason: HOSPADM

## 2022-06-06 RX ORDER — FENTANYL CITRATE 50 UG/ML
INJECTION, SOLUTION INTRAMUSCULAR; INTRAVENOUS PRN
Status: DISCONTINUED | OUTPATIENT
Start: 2022-06-06 | End: 2022-06-06

## 2022-06-06 RX ORDER — IBUPROFEN 200 MG
800 TABLET ORAL ONCE
Status: DISCONTINUED | OUTPATIENT
Start: 2022-06-06 | End: 2022-06-06 | Stop reason: HOSPADM

## 2022-06-06 RX ORDER — HYDROMORPHONE HYDROCHLORIDE 1 MG/ML
0.2 INJECTION, SOLUTION INTRAMUSCULAR; INTRAVENOUS; SUBCUTANEOUS EVERY 5 MIN PRN
Status: DISCONTINUED | OUTPATIENT
Start: 2022-06-06 | End: 2022-06-06 | Stop reason: HOSPADM

## 2022-06-06 RX ORDER — OXYCODONE HYDROCHLORIDE 5 MG/1
5 TABLET ORAL EVERY 4 HOURS PRN
Status: DISCONTINUED | OUTPATIENT
Start: 2022-06-06 | End: 2022-06-06 | Stop reason: HOSPADM

## 2022-06-06 RX ORDER — DEXAMETHASONE SODIUM PHOSPHATE 10 MG/ML
INJECTION, SOLUTION INTRAMUSCULAR; INTRAVENOUS PRN
Status: DISCONTINUED | OUTPATIENT
Start: 2022-06-06 | End: 2022-06-06

## 2022-06-06 RX ORDER — ONDANSETRON 2 MG/ML
4 INJECTION INTRAMUSCULAR; INTRAVENOUS EVERY 30 MIN PRN
Status: DISCONTINUED | OUTPATIENT
Start: 2022-06-06 | End: 2022-06-06 | Stop reason: HOSPADM

## 2022-06-06 RX ORDER — OXYCODONE HYDROCHLORIDE 5 MG/1
5 TABLET ORAL
Status: DISCONTINUED | OUTPATIENT
Start: 2022-06-06 | End: 2022-06-06 | Stop reason: HOSPADM

## 2022-06-06 RX ORDER — BUPIVACAINE HYDROCHLORIDE 2.5 MG/ML
INJECTION, SOLUTION INFILTRATION; PERINEURAL PRN
Status: DISCONTINUED | OUTPATIENT
Start: 2022-06-06 | End: 2022-06-06 | Stop reason: HOSPADM

## 2022-06-06 RX ADMIN — MIDAZOLAM 2 MG: 1 INJECTION INTRAMUSCULAR; INTRAVENOUS at 01:34

## 2022-06-06 RX ADMIN — PHENYLEPHRINE HYDROCHLORIDE 100 MCG: 10 INJECTION INTRAVENOUS at 02:04

## 2022-06-06 RX ADMIN — FENTANYL CITRATE 50 MCG: 50 INJECTION, SOLUTION INTRAMUSCULAR; INTRAVENOUS at 01:39

## 2022-06-06 RX ADMIN — ROCURONIUM BROMIDE 50 MG: 50 INJECTION, SOLUTION INTRAVENOUS at 01:43

## 2022-06-06 RX ADMIN — DEXAMETHASONE SODIUM PHOSPHATE 10 MG: 10 INJECTION, SOLUTION INTRAMUSCULAR; INTRAVENOUS at 01:43

## 2022-06-06 RX ADMIN — ONDANSETRON 4 MG: 2 INJECTION INTRAMUSCULAR; INTRAVENOUS at 01:43

## 2022-06-06 RX ADMIN — SODIUM CHLORIDE, POTASSIUM CHLORIDE, SODIUM LACTATE AND CALCIUM CHLORIDE: 600; 310; 30; 20 INJECTION, SOLUTION INTRAVENOUS at 01:20

## 2022-06-06 RX ADMIN — PROPOFOL 160 MG: 10 INJECTION, EMULSION INTRAVENOUS at 01:43

## 2022-06-06 RX ADMIN — SODIUM CHLORIDE, POTASSIUM CHLORIDE, SODIUM LACTATE AND CALCIUM CHLORIDE: 600; 310; 30; 20 INJECTION, SOLUTION INTRAVENOUS at 02:15

## 2022-06-06 RX ADMIN — FENTANYL CITRATE 25 MCG: 50 INJECTION INTRAMUSCULAR; INTRAVENOUS at 02:56

## 2022-06-06 RX ADMIN — LIDOCAINE HYDROCHLORIDE 30 MG: 10 INJECTION, SOLUTION INFILTRATION; PERINEURAL at 01:43

## 2022-06-06 RX ADMIN — SODIUM CHLORIDE 1000 ML: 9 INJECTION, SOLUTION INTRAVENOUS at 00:37

## 2022-06-06 RX ADMIN — FENTANYL CITRATE 50 MCG: 50 INJECTION, SOLUTION INTRAMUSCULAR; INTRAVENOUS at 02:25

## 2022-06-06 NOTE — ANESTHESIA PREPROCEDURE EVALUATION
Anesthesia Pre-Procedure Evaluation    Patient: Kd Garcia   MRN: [id number] : 1995        Procedure : Procedure(s):  SALPINGECTOMY, LAPAROSCOPIC          Past Medical History:   Diagnosis Date     Anemia due to blood loss, acute 2021     Intrinsic eczema onset in winter since 12 y/o but in remission till recent delivery in 2017     Papanicolaou smear of cervix with atypical squamous cells of undetermined significance (ASC-US) 16     Possible recent exposure to tuberculosis 3/25/2019    On 2019 at clinicals at [facility].  No symptoms.  Recommended Pa speak to Employee nehal at [facility]. Consider TB Gold.       Past Surgical History:   Procedure Laterality Date     HC OPEN RX DISTAL RADIUS FX, EXTRA-ARTICULAR      left arm fx     WISDOM TOOTH EXTRACTION        Allergies   Allergen Reactions     Reglan [Metoclopramide] Anxiety      Social History     Tobacco Use     Smoking status: Former Smoker     Packs/day: 0.00     Smokeless tobacco: Never Used   Substance Use Topics     Alcohol use: Not Currently      Wt Readings from Last 1 Encounters:   22 68 kg (150 lb)        Anesthesia Evaluation            ROS/MED HX  ENT/Pulmonary:  - neg pulmonary ROS     Neurologic:  - neg neurologic ROS     Cardiovascular:  - neg cardiovascular ROS     METS/Exercise Tolerance:     Hematologic:  - neg hematologic  ROS     Musculoskeletal:  - neg musculoskeletal ROS     GI/Hepatic:  - neg GI/hepatic ROS     Renal/Genitourinary:  - neg Renal ROS     Endo:  - neg endo ROS     Psychiatric/Substance Use:       Infectious Disease:       Malignancy:       Other:      (+) Possibly pregnant (ectopic), ,         Physical Exam    Airway  airway exam normal      Mallampati: II   TM distance: > 3 FB   Neck ROM: full   Mouth opening: > 3 cm    Respiratory Devices and Support         Dental  no notable dental history         Cardiovascular   cardiovascular exam normal          Pulmonary    pulmonary exam normal                OUTSIDE LABS:  CBC:   Lab Results   Component Value Date    WBC 8.7 06/05/2022    WBC 7.5 05/18/2022    HGB 9.8 (L) 06/05/2022    HGB 12.6 05/18/2022    HCT 30.6 (L) 06/05/2022    HCT 38.4 05/18/2022     06/05/2022     05/18/2022     BMP:   Lab Results   Component Value Date     06/05/2022     06/25/2021    POTASSIUM 3.8 06/05/2022    POTASSIUM 3.9 06/25/2021    CHLORIDE 108 (H) 06/05/2022    CHLORIDE 110 (H) 06/25/2021    CO2 24 06/05/2022    CO2 23 06/25/2021    BUN 9 06/05/2022    BUN 8 06/25/2021    CR 0.67 06/05/2022    CR 0.51 (L) 06/25/2021    GLC 93 06/05/2022    GLC 73 06/25/2021     COAGS: No results found for: PTT, INR, FIBR  POC:   Lab Results   Component Value Date    HCG Negative 12/17/2019     HEPATIC:   Lab Results   Component Value Date    ALBUMIN 3.7 06/05/2022    PROTTOTAL 6.9 06/05/2022    ALT 10 06/05/2022    AST 12 06/05/2022    ALKPHOS 62 06/05/2022    BILITOTAL 0.6 06/05/2022     OTHER:   Lab Results   Component Value Date    A1C 5.6 05/18/2022    HARPREET 8.3 (L) 06/05/2022    MAG 1.8 05/18/2022    LIPASE 12 06/05/2022    AMYLASE 72 06/25/2021    TSH 1.11 05/18/2022    T4 1.10 11/24/2020       Anesthesia Plan    ASA Status:  2   NPO Status:  NPO Appropriate    Anesthesia Type: General.     - Airway: ETT   Induction: Intravenous.   Maintenance: Balanced.        Consents    Anesthesia Plan(s) and associated risks, benefits, and realistic alternatives discussed. Questions answered and patient/representative(s) expressed understanding.    - Discussed:     - Discussed with:  Patient         Postoperative Care    Pain management: Multi-modal analgesia.   PONV prophylaxis: Ondansetron (or other 5HT-3), Dexamethasone or Solumedrol     Comments:                Floresita Sheets MD

## 2022-06-06 NOTE — H&P
I was asked by Dr. Tucker to see Kd Garcia for ectopic pregnancy    Kd Garcia is a 26 year old  who presents with RLQ pain that progressed to diffuse abdominal pain. She states that she got a prescription for mifepristone which she took Friday and then misoprostol Saturday at 5 pm. She states that shortly after taking the mifepristone she started having RLQ pain. Last ate noodles this afternoon and clear liquids in the ER. She had not had an US during this pregnancy to this point.    PMHx: Denies  Meds: None  OB hx: 4 medical abortions, 3 early miscarriages passed at home, 4 term vaginal deliveries     O:  /58   Pulse 85   Temp 99.1  F (37.3  C) (Oral)   Resp 16   Ht 1.524 m (5')   Wt 68 kg (150 lb)   LMP 04/24/2022 (Exact Date)   SpO2 100%   BMI 29.29 kg/m    Gen: Alert, NAD  Abd: Mild diffuse tenderness, right side marked    Component      Latest Ref Rng & Units 6/5/2022   WBC      4.0 - 11.0 10e3/uL 8.7   RBC Count      3.80 - 5.20 10e6/uL 3.45 (L)   Hemoglobin      11.7 - 15.7 g/dL 9.8 (L)   Hematocrit      35.0 - 47.0 % 30.6 (L)   MCV      78 - 100 fL 89   MCH      26.5 - 33.0 pg 28.4   MCHC      31.5 - 36.5 g/dL 32.0   RDW      10.0 - 15.0 % 13.0   Platelet Count      150 - 450 10e3/uL 286   % Neutrophils      % 66   % Lymphocytes      % 24   % Monocytes      % 8   % Eosinophils      % 2   % Basophils      % 0   % Immature Granulocytes      % 0   NRBCs per 100 WBC      <1 /100 0   Absolute Neutrophils      1.6 - 8.3 10e3/uL 5.7   Absolute Lymphocytes      0.8 - 5.3 10e3/uL 2.1   Absolute Monocytes      0.0 - 1.3 10e3/uL 0.7   Absolute Eosinophils      0.0 - 0.7 10e3/uL 0.2   Absolute Basophils      0.0 - 0.2 10e3/uL 0.0   Absolute Immature Granulocytes      <=0.4 10e3/uL 0.0   Absolute NRBCs      10e3/uL 0.0   Sodium      136 - 145 mmol/L 138   Potassium      3.5 - 5.0 mmol/L 3.8   Chloride      98 - 107 mmol/L 108 (H)   Carbon Dioxide      22 - 31 mmol/L 24   Anion Gap      5 - 18  mmol/L 6   Urea Nitrogen      8 - 22 mg/dL 9   Creatinine      0.60 - 1.10 mg/dL 0.67   Calcium      8.5 - 10.5 mg/dL 8.3 (L)   Glucose      70 - 125 mg/dL 93   GFR Estimate      >60 mL/min/1.73m2 >90   Bilirubin Total      0.0 - 1.0 mg/dL 0.6   Bilirubin Direct      <=0.5 mg/dL 0.2   Protein Total      6.0 - 8.0 g/dL 6.9   Albumin      3.5 - 5.0 g/dL 3.7   Alkaline Phosphatase      45 - 120 U/L 62   AST      0 - 40 U/L 12   ALT      0 - 45 U/L 10   Lipase      0 - 52 U/L 12   hCG Quantitative      0 - 4 mlU/mL 554 (H)       INDICATION: Diffuse abdominal pain and pelvic fullness. Medically induced termination 3 days ago.  COMPARISON: None.  TECHNIQUE: Transabdominal scans were performed. Endovaginal ultrasound was performed to better visualize the embryo.     FINDINGS:  UTERUS/ENDOMETRIUM: Uterus measures 10.2 x 5 x 6.3 cm. Endometrial thickness at 2.1 cm without intrauterine pregnancy. Ectopic pregnancy cannot be excluded.     RIGHT ADNEXA: Complex fluid in the right adnexa with a complex hypo and hyperechoic structure measuring 3.9 x 2.2 x 4.6 cm on transabdominal imaging. Ectopic pregnancy in the right adnexa cannot be excluded. The right ovary is visualized and measures 3.6   x 1.3 x 1.8 cm. Flow present to the right ovary on waveform analysis.     LEFT ADNEXA: Left ovary measures 3.6 x 2 x 2.3 cm. Flow present to the left ovary on waveform analysis.     ADDITIONAL FINDINGS: Complex fluid in the cul-de-sac with adjacent nondilated bowel. Complex fluid anterior and superior to the uterus.                                                                      IMPRESSION:   1. Endometrial thickening at 2.1 cm without evidence for intrauterine pregnancy. Ectopic pregnancy cannot be excluded.     2. Complex fluid in the right adnexa with complex hypoechoic and hyperechoic mass in the right adnexa measuring 3.9 x 2.2 x 4.6 cm on transabdominal imaging. Findings are concerning for possible ectopic pregnancy in the right  adnexa. Additionally, there   is complex fluid superior and posterior to the uterus likely representing blood products.     3. Normal appearance to the right and left ovary with flow present to both the right and left ovary on waveform analysis.    A/P:  Kd Garcia is a  who presents with suspected ruptured ectopic pregnancy.  -Type and screen, COVID swab.  -Informed consent signed for surgery, blood products if needed.        Nydia Holland MD, FACOG, [facility]  6/6/2022 12:50 AM

## 2022-06-06 NOTE — ED PROVIDER NOTES
NAME: Kd Garcia  AGE: 26 year old female  YOB: 1995  MRN: [id number]  EVALUATION DATE & TIME: 2022  9:23 PM    PCP: Paulette Stacy    ED PROVIDER: Bob Tucker M.D.      Chief Complaint   Patient presents with     Abdominal Pain     FINAL IMPRESSION:  1. Ruptured ectopic pregnancy      MEDICAL DECISION MAKIN:32 PM I met with the patient, obtained history, performed an initial exam, and discussed options and plan for diagnostics and treatment here in the ED.   Patient was clinically assessed and consented to treatment. After assessment, medical decision making and workup were discussed with the patient. The patient was agreeable to plan for testing, workup, and treatment.  Pertinent Labs & Imaging studies reviewed. (See chart for details)  11:53 PM I spoke with radiology.   11:59 PM I spoke with JANICE Campos.   12:03 AM I rechecked and updated the patient. Patient reports that she got her medical  pill through a website and has not had an ultrasound. US shows an ectopic pregnancy. Patient took the  pill with the medication she got post pregnancy last summer. She notes that her pain started a couple hours on the right side of her abdomen after she took the  pill.      Kd Garcia is a 26 year old female who presents with diffuse abdominal pain.   Differential diagnosis includes but not limited to small bowel infection, constipation, uterine rupture, retained products of conception, endometritis.  Patient initially presenting for bloating and abdominal pain which she reports history of constipation.  Patient initially seen in the hallway and did not provide information about the recent pregnancy.  On subsequent interviews she then reported having a recent pregnancy at 4 weeks and had taken medical  pills.  She could not recall the name of the medication however had to take 2 doses over 24.  She also report using medications from her past  pregnancy likely Cytotec for uterine contractions.  With this she had diffuse abdominal pain but did have vaginal bleeding that has slowed now.  Patient's vital signs are otherwise stable and initially consider small bowel obstruction or constipation and CT scan however after discussing further and patient now reporting the pregnancy will start with pelvic ultrasound.  Patient taken from triage for ultrasound and labs done in triage showed a drop in hemoglobin but otherwise unremarkable.  Patient is not tachycardic despite the drop in hemoglobin.  Pregnancy was checked in triage and also showed elevated beta hCG.  Ultrasound returned showing possible ectopic pregnancy with rupture in the right adnexa.  On further discussion with patient she does report the pain started more in the right lower quadrant after taking the first dose of  medication.  She also reports that she never had an ultrasound for the pregnancy ordering the medical  pills through a website.  After ultrasound findings returned I did speak with OB/Gyn.  They will come and see the patient and plan for operative management of ruptured ectopic pregnancy.    0 minutes of critical care time    MEDICATIONS GIVEN IN THE EMERGENCY:  Medications   morphine (PF) injection 4 mg (has no administration in time range)   ondansetron (ZOFRAN) injection 4 mg (has no administration in time range)   HYDROmorphone (DILAUDID) injection 0.5 mg (has no administration in time range)   0.9% sodium chloride BOLUS (1,000 mLs Intravenous New Bag 22 0037)       NEW PRESCRIPTIONS STARTED AT TODAY'S ER VISIT:  New Prescriptions    No medications on file          =================================================================    HPI    Patient information was obtained from: Patient    Use of : N/A       Kd Garcia is a 26 year old female with a past medical history of  who presents to this ED by walk in for evaluation of abdominal pain.      Patient reports that on 6/3 patient took an medical  pill and believes she was four weeks along at that time. She has had regular bleeding from this the last two days without any discharge. Patient felt constipated initially after taking the pill and took a stimulant from her last birth in 2021. She was able to eat and had a bowel movement which then brought on abdominal pain and bloating which she has had since. Her pain is cramping, tender, and has intermittent sharp pains. She has no history of this or any history of abdominal surgeries. She did have a bowel movement this evening with loose stool but denies diarrhea. It does hurt for her to walk or roll in bed. Patient denies any nausea ,vomiting, constipation, or any other complaints at this time.      REVIEW OF SYSTEMS   Review of Systems   Gastrointestinal: Positive for abdominal distention and abdominal pain. Negative for constipation, diarrhea, nausea and vomiting.   Genitourinary: Positive for vaginal bleeding (from  pill). Negative for vaginal discharge.   All other systems reviewed and are negative.       PAST MEDICAL HISTORY:  Past Medical History:   Diagnosis Date     Anemia due to blood loss, acute 2021     Intrinsic eczema onset in winter since 12 y/o but in remission till recent delivery in 2017     Papanicolaou smear of cervix with atypical squamous cells of undetermined significance (ASC-US) 16     Possible recent exposure to tuberculosis 3/25/2019    On 2019 at clinicals at [facility].  No symptoms.  Recommended Pa speak to Employee nehal at [facility]. Consider TB Gold.        PAST SURGICAL HISTORY:  Past Surgical History:   Procedure Laterality Date     HC OPEN RX DISTAL RADIUS FX, EXTRA-ARTICULAR      left arm fx     WISDOM TOOTH EXTRACTION         CURRENT MEDICATIONS:      Current Facility-Administered Medications:      HYDROmorphone (DILAUDID) injection 0.5 mg, 0.5 mg,  Intravenous, Q15 Min PRN, Bob Tucker MD     morphine (PF) injection 4 mg, 4 mg, Intravenous, Once PRN, Bob Tucker MD     ondansetron (ZOFRAN) injection 4 mg, 4 mg, Intravenous, Q30 Min PRN, Bob Tucker MD    Current Outpatient Medications:      norethindrone (MICRONOR) 0.35 MG tablet, Take 1 tablet (0.35 mg) by mouth daily, Disp: 90 tablet, Rfl: 3     valACYclovir (VALTREX) 1000 mg tablet, Take 2 tablets (2,000 mg) by mouth 2 times daily for 1 day, Disp: 4 tablet, Rfl: 0    ALLERGIES:  Allergies   Allergen Reactions     Reglan [Metoclopramide] Anxiety       FAMILY HISTORY:  Family History   Problem Relation Age of Onset     Unknown/Adopted Mother      C.A.D. Father 70        MI     Hypertension Father      Coronary Artery Disease Father      Cerebrovascular Disease Father      Diabetes No family hx of      Hyperlipidemia No family hx of      Breast Cancer No family hx of      Colon Cancer No family hx of      Prostate Cancer No family hx of      Other Cancer No family hx of      Depression No family hx of      Anxiety Disorder No family hx of      Mental Illness No family hx of      Substance Abuse No family hx of      Anesthesia Reaction No family hx of      Asthma No family hx of      Osteoporosis No family hx of      Genetic Disorder No family hx of      Thyroid Disease No family hx of      Obesity No family hx of        SOCIAL HISTORY:   Social History     Socioeconomic History     Marital status: Single     Number of children: 0   Occupational History     Occupation: student   Tobacco Use     Smoking status: Former Smoker     Packs/day: 0.00     Smokeless tobacco: Never Used   Substance and Sexual Activity     Alcohol use: Not Currently     Drug use: No     Sexual activity: Yes     Partners: Male     Birth control/protection: None     Comment: none   Other Topics Concern     Parent/sibling w/ CABG, MI or angioplasty before 65F 55M? No     PHYSICAL EXAM:     Vitals: /60   Pulse 105   Temp 99.1  F (37.3  C) (Oral)   Resp 19   Ht 1.524 m (5')   Wt 68 kg (150 lb)   LMP 04/24/2022 (Exact Date)   SpO2 100%   BMI 29.29 kg/m     Physical Exam  Vitals and nursing note reviewed.   Constitutional:       General: She is not in acute distress.     Appearance: Normal appearance. She is normal weight. She is not ill-appearing or toxic-appearing.   HENT:      Head: Normocephalic.   Cardiovascular:      Rate and Rhythm: Normal rate.      Heart sounds: Normal heart sounds.   Pulmonary:      Effort: Pulmonary effort is normal. No respiratory distress.      Breath sounds: Normal breath sounds.   Abdominal:      General: There is distension (Soft but slight distention.).      Palpations: Abdomen is soft.      Tenderness: There is abdominal tenderness. There is guarding. There is no right CVA tenderness, left CVA tenderness or rebound.   Musculoskeletal:         General: Normal range of motion.      Cervical back: Normal range of motion.   Skin:     General: Skin is warm and dry.      Coloration: Skin is pale. Skin is not jaundiced.   Neurological:      General: No focal deficit present.      Mental Status: She is alert.   Psychiatric:         Behavior: Behavior normal.        LAB:  All pertinent labs reviewed and interpreted.  Labs Ordered and Resulted from Time of ED Arrival to Time of ED Departure   BASIC METABOLIC PANEL - Abnormal       Result Value    Sodium 138      Potassium 3.8      Chloride 108 (*)     Carbon Dioxide (CO2) 24      Anion Gap 6      Urea Nitrogen 9      Creatinine 0.67      Calcium 8.3 (*)     Glucose 93      GFR Estimate >90     CBC WITH PLATELETS AND DIFFERENTIAL - Abnormal    WBC Count 8.7      RBC Count 3.45 (*)     Hemoglobin 9.8 (*)     Hematocrit 30.6 (*)     MCV 89      MCH 28.4      MCHC 32.0      RDW 13.0      Platelet Count 286      % Neutrophils 66      % Lymphocytes 24      % Monocytes 8      % Eosinophils 2      % Basophils 0      %  Immature Granulocytes 0      NRBCs per 100 WBC 0      Absolute Neutrophils 5.7      Absolute Lymphocytes 2.1      Absolute Monocytes 0.7      Absolute Eosinophils 0.2      Absolute Basophils 0.0      Absolute Immature Granulocytes 0.0      Absolute NRBCs 0.0     HCG QUANTITATIVE PREGNANCY - Abnormal    hCG Quantitative 554 (*)    HEPATIC FUNCTION PANEL - Normal    Bilirubin Total 0.6      Bilirubin Direct 0.2      Protein Total 6.9      Albumin 3.7      Alkaline Phosphatase 62      AST 12      ALT 10     LIPASE - Normal    Lipase 12     ROUTINE UA WITH MICROSCOPIC REFLEX TO CULTURE   COVID-19 VIRUS (CORONAVIRUS) BY PCR   TYPE AND SCREEN, ADULT   ABO/RH TYPE AND SCREEN       RADIOLOGY:  US OB <14 Weeks W Transvaginal   Final Result   IMPRESSION:    1. Endometrial thickening at 2.1 cm without evidence for intrauterine pregnancy. Ectopic pregnancy cannot be excluded.      2. Complex fluid in the right adnexa with complex hypoechoic and hyperechoic mass in the right adnexa measuring 3.9 x 2.2 x 4.6 cm on transabdominal imaging. Findings are concerning for possible ectopic pregnancy in the right adnexa. Additionally, there    is complex fluid superior and posterior to the uterus likely representing blood products.      3. Normal appearance to the right and left ovary with flow present to both the right and left ovary on waveform analysis.      Results discussed with Dr. Tucker at 11:52 PM 06/05/2022.          I, Marek Summers, am serving as a scribe to document services personally performed by Dr. Bob Tucker  based on my observation and the provider's statements to me. I, Bob Tucker MD attest that Marek Summers is acting in a scribe capacity, has observed my performance of the services and has documented them in accordance with my direction.      Bob Tucker M.D.  Emergency Medicine  [facility]     Bob Tucker MD  06/06/22 0105

## 2022-06-06 NOTE — ANESTHESIA POSTPROCEDURE EVALUATION
Patient: Kd Garcia    Procedure: Procedure(s):  SALPINGECTOMY, LAPAROSCOPIC, Evacuation of Pneumoperitoneum       Anesthesia Type:  No value filed.    Note:  Disposition: Outpatient   Postop Pain Control: Uneventful            Sign Out: Well controlled pain   PONV: No   Neuro/Psych: Uneventful            Sign Out: Acceptable/Baseline neuro status   Airway/Respiratory: Uneventful            Sign Out: Acceptable/Baseline resp. status   CV/Hemodynamics: Uneventful            Sign Out: Acceptable CV status; No obvious hypovolemia; No obvious fluid overload   Other NRE: NONE   DID A NON-ROUTINE EVENT OCCUR? No           Last vitals:  Vitals Value Taken Time   /67 06/06/22 0245   Temp 37.1  C (98.8  F) 06/06/22 0237   Pulse 93 06/06/22 0248   Resp 23 06/06/22 0248   SpO2 100 % 06/06/22 0248   Vitals shown include unvalidated device data.    Electronically Signed By: Floresita Sheets MD  June 6, 2022  2:50 AM

## 2022-06-06 NOTE — ANESTHESIA PROCEDURE NOTES
Airway       Patient location during procedure: OR       Procedure Start/Stop Times: 6/6/2022 1:47 AM  Staff -        CRNA: Lady Ramirez APRN CRNA       Performed By: CRNAIndications and Patient Condition       Indications for airway management: james-procedural       Induction type:intravenous       Mask difficulty assessment: 1 - vent by mask    Final Airway Details       Final airway type: endotracheal airway       Successful airway: ETT - single  Endotracheal Airway Details        ETT size (mm): 7.0       Cuffed: yes       Cuff volume (mL): 10       Successful intubation technique: direct laryngoscopy       DL Blade Type: Hernandez 2       Grade View of Cords: 1       Adjucts: stylet       Position: Right       Measured from: lips       Secured at (cm): 22       Bite block used: None    Post intubation assessment        Number of attempts at approach: 1       Secured with: silk tape       Ease of procedure: easy       Dentition: Intact and Unchanged    Medication(s) Administered   Medication Administration Time: 6/6/2022 1:47 AM

## 2022-06-06 NOTE — OP NOTE
Operative note    Preoperative diagnosis: Suspected right adnexal ectopic pregnancy  Postoperative diagnosis: Same, 1 liter hemoperitoneum    Procedure: Laparoscopic right salpingectomy, evacuation of hemoperitoneum.    Surgeon: Nydia Holland MD     Anesthesia: General  Complications: None  EBL: 1000 mL    Urine output: 50 mL    Findings: 1 liter hemoperitoneum with extruding right adnexal pregnancy. Right fallopian tube still bleeding despite suctioning ectopic pregnancy out of the fimbriated end of the tube. Otherwise normal appearing left tube and uterus.    Indications: Patient is a 26 year old  who presented with RLQ pain and diagnosed with a right adnexal mass with complex fluid suspicious for ruptured ectopic. The risks, benefits and alternatives of surgical management were discussed in detail with the patient and she signed an informed consent on the morning of the procedure.    Procedure:  The patient was taken to the operating room where general endotracheal anesthesia was administered without difficulty.  She was then placed in the dorsal lithotomy position.   She was prepped and draped in the usual sterile fashion.  A blue catheter was placed.  A bivalve speculum was placed in the patient's vagina and the anterior lip of the cervix was grasped with the single toothed tenaculum.  A sound for a uterine manipulator was then advanced into the uterus and the single toothed tenaculum was removed.  The speculum was removed from the vagina.    Attention was then turned to the patient's abdomen where a 5 mm vertical skin incision was made in the umbilical fold after infiltration with 0.25% marcaine.  The Veress needle was carefully introduced into the peritoneal cavity at a 90 degree angle while tenting the abdominal wall.  Intraperitoneal placement was confirmed by freely flowing water through the Veress needle and an intraabdominal pressure of 4 mm on insufflation with CO2 gas.  The 5 mm trocar and  sleeve were then advanced without difficulty into the abdomen where intraabdominal placement was confirmed by the laparoscope.  No intraabdominal injury was noted from the placement of the first trocar.  After infiltration with 0.25% marcaine, a left lower quadrant 5 mm skin incision was made and trocar and sleeve advanced under direct visualization.  After infiltration with 0.25% marcaine, a 5 mm left lower quadrant skin incision was made and trocar and sleeve advanced under direct visualization.    A survey of the patient's abdomen revealed a normal liver surface with blood to level of the liver.  The patient was then placed in steep Trendelenberg position and a survey of the patient's pelvis revealed the above findings. Suction was used to evacuate 1 liter of clots and active bleeding.    The Ligasure was then advanced through a port and the patient's right fallopian tube was identified and followed out to the fimbriated end. The fallopian tube was sequentially coagulated and cut from the mesosalpinx to the level of the cornua where the fallopian tube was transected.  The right fallopian tube was removed through the 5 mm port and sent to pathology.  There was no bleeding in the mesosalpinx.     All instruments and ports were then removed from the patient's abdomen.  Incisions were repaired with 4-0 Vicryl and Exofin.  The uterine manipulator was removed from the vagina with no significant bleeding noted from the cervix at the tenaculum site.  The patient tolerated the procedure well.  Sponge, lap, and needle counts were correct.  The patient was taken to the recovery room in stable condition.    Nydia Holland MD

## 2022-06-06 NOTE — ANESTHESIA CARE TRANSFER NOTE
Patient: Kd Garcia    Procedure: Procedure(s):  SALPINGECTOMY, LAPAROSCOPIC, Evacuation of Pneumoperitoneum       Diagnosis: Ruptured ectopic pregnancy [O00.90]  Diagnosis Additional Information: No value filed.    Anesthesia Type:   No value filed.     Note:    Oropharynx: oropharynx clear of all foreign objects  Level of Consciousness: drowsy  Oxygen Supplementation: face mask  Level of Supplemental Oxygen (L/min / FiO2): 6  Independent Airway: airway patency satisfactory and stable  Dentition: dentition unchanged  Vital Signs Stable: post-procedure vital signs reviewed and stable  Report to RN Given: handoff report given  Patient transferred to: PACU    Handoff Report: Identifed the Patient, Identified the Reponsible Provider, Reviewed the pertinent medical history, Discussed the surgical course, Reviewed Intra-OP anesthesia mangement and issues during anesthesia, Set expectations for post-procedure period and Allowed opportunity for questions and acknowledgement of understanding      Vitals:  Vitals Value Taken Time   /68 06/06/22 0238   Temp 98.8 f   0238   Pulse 102 06/06/22 0237   Resp 15 06/06/22 0237   SpO2 100 % 06/06/22 0237   Vitals shown include unvalidated device data.    Electronically Signed By: PETER Roberts CRNA  June 6, 2022  2:39 AM

## 2022-06-06 NOTE — DISCHARGE INSTRUCTIONS
START TAKING IRON SUPPLEMENTS  HEMOGLOBIN RECHECK IN OFFICE in 2 weeks      Discharge Instructions: After Your Surgery  You ve just had surgery. During surgery, you were given medicine called anesthesia to keep you relaxed and free of pain. After surgery, you may have some pain or nausea. This is common. Here are some tips for feeling better and getting well after surgery.     Stay on schedule with your medicine.   Going home  Your healthcare provider will show you how to take care of yourself when you go home. He or she will also answer your questions. Have an adult family member or friend drive you home. For the first 24 hours after your surgery:  Don't drive or use heavy equipment.  Don't make important decisions or sign legal papers.  Don't drink alcohol.  Have someone stay with you, if needed. He or she can watch for problems and help keep you safe.  Be sure to go to all follow-up visits with your healthcare provider. And rest after your surgery for as long as your healthcare provider tells you to.  Coping with pain  If you have pain after surgery, pain medicine will help you feel better. Take it as told, before pain becomes severe. Also, ask your healthcare provider or pharmacist about other ways to control pain. This might be with heat, ice, or relaxation. And follow any other instructions your surgeon or nurse gives you.  Tips for taking pain medicine  To get the best relief possible, remember these points:  Pain medicines can upset your stomach. Taking them with a little food may help.  Most pain relievers taken by mouth need at least 20 to 30 minutes to start to work.  Don't wait till your pain becomes severe before you take your medicine. Try to time your medicine so that you can take it before starting an activity. This might be before you get dressed, go for a walk, or sit down for dinner.  Constipation is a common side effect of pain medicines. Call your healthcare provider before taking any  medicines such as laxatives or stool softeners to help ease constipation. Also ask if you should skip any foods. Drinking lots of fluids and eating foods such as fruits and vegetables that are high in fiber can also help. Remember, don't take laxatives unless your surgeon has prescribed them.  Drinking alcohol and taking pain medicine can cause dizziness and slow your breathing. It can even be deadly. Don't drink alcohol while taking pain medicine.  Pain medicine can make you react more slowly to things. Don't drive or run machinery while taking pain medicine.  Your healthcare provider may tell you to take acetaminophen to help ease your pain. Ask him or her how much you are supposed to take each day. Acetaminophen or other pain relievers may interact with your prescription medicines or other over-the-counter (OTC) medicines. Some prescription medicines have acetaminophen and other ingredients. Using both prescription and OTC acetaminophen for pain can cause you to overdose. Read the labels on your OTC medicines with care. This will help you to clearly know the list of ingredients, how much to take, and any warnings. It may also help you not take too much acetaminophen. If you have questions or don't understand the information, ask your pharmacist or healthcare provider to explain it to you before you take the OTC medicine.  Managing nausea  Some people have an upset stomach after surgery. This is often because of anesthesia, pain, or pain medicine, or the stress of surgery. These tips will help you handle nausea and eat healthy foods as you get better. If you were on a special food plan before surgery, ask your healthcare provider if you should follow it while you get better. These tips may help:  Don't push yourself to eat. Your body will tell you when to eat and how much.  Start off with clear liquids and soup. They are easier to digest.  Next try semi-solid foods, such as mashed potatoes, applesauce, and  gelatin, as you feel ready.  Slowly move to solid foods. Don t eat fatty, rich, or spicy foods at first.  Don't force yourself to have 3 large meals a day. Instead eat smaller amounts more often.  Take pain medicines with a small amount of solid food, such as crackers or toast, to prevent nausea.  When to call your healthcare provider  Call your healthcare provider if:  You still have intolerable pain an hour after taking medicine. The medicine may not be strong enough.  You feel too sleepy, dizzy, or groggy. The medicine may be too strong.  You have side effects such as nausea or vomiting, or skin changes such as rash, itching, or hives. Your healthcare provider may suggest other medicines to control side effects.  Rash, itching, or hives may mean you have an allergic reaction. Report this right away. If you have trouble breathing or facial swelling, call 911 right away.  If you have obstructive sleep apnea  You were given anesthesia medicine during surgery to keep you comfortable and free of pain. After surgery, you may have more apnea spells because of this medicine and other medicines you were given. The spells may last longer than usual.   At home:  Keep using the continuous positive airway pressure (CPAP) device when you sleep. Unless your healthcare provider tells you not to, use it when you sleep, day or night. CPAP is a common device used to treat obstructive sleep apnea.  Talk with your provider before taking any pain medicine, muscle relaxants, or sedatives. Your provider will tell you about the possible dangers of taking these medicines.  [employer] last reviewed this educational content on 3/1/2019    7272-9415 [employer]. All rights reserved. This information is not intended as a substitute for professional medical care. Always follow your healthcare professional's instructions.

## 2022-06-08 LAB
PATH REPORT.COMMENTS IMP SPEC: NORMAL
PATH REPORT.COMMENTS IMP SPEC: NORMAL
PATH REPORT.FINAL DX SPEC: NORMAL
PATH REPORT.GROSS SPEC: NORMAL
PATH REPORT.MICROSCOPIC SPEC OTHER STN: NORMAL
PATH REPORT.RELEVANT HX SPEC: NORMAL
PHOTO IMAGE: NORMAL

## 2022-10-11 ENCOUNTER — MYC MEDICAL ADVICE (OUTPATIENT)
Dept: FAMILY MEDICINE | Facility: CLINIC | Age: 27
End: 2022-10-11

## 2022-10-11 ENCOUNTER — MYC REFILL (OUTPATIENT)
Dept: FAMILY MEDICINE | Facility: CLINIC | Age: 27
End: 2022-10-11

## 2022-10-11 DIAGNOSIS — B00.1 RECURRENT COLD SORES: ICD-10-CM

## 2022-10-11 RX ORDER — VALACYCLOVIR HYDROCHLORIDE 1 G/1
2000 TABLET, FILM COATED ORAL 2 TIMES DAILY
Qty: 4 TABLET | Refills: 4 | Status: SHIPPED | OUTPATIENT
Start: 2022-10-11 | End: 2023-09-25

## 2022-10-11 NOTE — TELEPHONE ENCOUNTER
"Last Written Prescription Date:  5/27/22  Last Fill Quantity: 4,  # refills: 0   Last office visit provider:  5/18/22     Requested Prescriptions   Pending Prescriptions Disp Refills     valACYclovir (VALTREX) 1000 mg tablet 4 tablet 0     Sig: Take 2 tablets (2,000 mg) by mouth 2 times daily       Antivirals for Herpes Protocol Passed - 10/11/2022  7:13 AM        Passed - Patient is age 12 or older        Passed - Recent (12 mo) or future (30 days) visit within the authorizing provider's specialty     Patient has had an office visit with the authorizing provider or a provider within the authorizing providers department within the previous 12 mos or has a future within next 30 days. See \"Patient Info\" tab in inbasket, or \"Choose Columns\" in Meds & Orders section of the refill encounter.              Passed - Medication is active on med list        Passed - Normal serum creatinine on file in past 12 months     Recent Labs   Lab Test 06/05/22  1933   CR 0.67       Ok to refill medication if creatinine is low               Hiro Mayo RN 10/11/22 7:13 AM  "

## 2022-11-09 ENCOUNTER — MYC MEDICAL ADVICE (OUTPATIENT)
Dept: FAMILY MEDICINE | Facility: CLINIC | Age: 27
End: 2022-11-09

## 2023-01-01 NOTE — LETTER
Detail Level: Zone October 10, 2017      Kd Garcia  [address] [place] [address]        Dear ,    We are writing to inform you of your test results.    Your test results fall within the expected range(s) or remain unchanged from previous results.  Please continue with current treatment plan. I would suggest you start to take a daily over the counter iron supplement in addition to your prenatal vitamin.    Resulted Orders   OB hemoglobin   Result Value Ref Range    Hemoglobin 10.7 (L) 11.7 - 15.7 g/dL   Group B strep PCR   Result Value Ref Range    Group B Strep PCR Spec Jm Vaginal Rectal     Group B Strep PCR Negative NEG^Negative      Comment:      No GBS DNA detected, presumed negative for GBS or number of bacteria may be   below the limit of detection of the assay.  Assay performed on incubated broth culture of specimen using [employer] real-time   PCR.         If you have any questions or concerns, please call the clinic at the number listed above.       Sincerely,        Izzy Mejia MD

## 2023-04-20 ENCOUNTER — PATIENT OUTREACH (OUTPATIENT)
Dept: CARE COORDINATION | Facility: CLINIC | Age: 28
End: 2023-04-20
Payer: COMMERCIAL

## 2023-04-27 NOTE — TELEPHONE ENCOUNTER
Pt is past due for f/u pap smear.  Reminder letter was sent 06/05/17 and was returned.  LMTC and schedule at [facility].  Left this writer's number in case of questions ([phone]).  If no reply and/or appt within 2 weeks (07/14/17) pt will be considered lost to pap tracking f/u.  Sallie Lara,    Pap Tracking      
Pt returned my call, states she has turned in information for new insurance and is just waiting for it to process.  Plans to call and scheduled ASAP as she is currently 21 weeks and has been without any prenatal care.  Sallie Lara,    Pap Tracking    
None

## 2023-05-04 ENCOUNTER — PATIENT OUTREACH (OUTPATIENT)
Dept: CARE COORDINATION | Facility: CLINIC | Age: 28
End: 2023-05-04
Payer: COMMERCIAL

## 2023-06-24 ENCOUNTER — HEALTH MAINTENANCE LETTER (OUTPATIENT)
Age: 28
End: 2023-06-24

## 2023-09-05 NOTE — PROGRESS NOTES
Injectable Influenza Immunization Documentation    1.  Is the person to be vaccinated sick today?   No    2. Does the person to be vaccinated have an allergy to a component   of the vaccine?   No  Egg Allergy Algorithm Link    3. Has the person to be vaccinated ever had a serious reaction   to influenza vaccine in the past?   No    4. Has the person to be vaccinated ever had Guillain-Barré syndrome?   No    Form completed by Amanda Torres            Hemostasis started on the right femoral artery. Angio-Seal was used in achieving hemostasis. Closure device deployed in the vessel. Hemostasis achieved successfully. Closure device additional comment:  6 FR ANGIOSEAL

## 2023-09-25 DIAGNOSIS — B00.1 RECURRENT COLD SORES: ICD-10-CM

## 2023-09-25 RX ORDER — VALACYCLOVIR HYDROCHLORIDE 1 G/1
2000 TABLET, FILM COATED ORAL 2 TIMES DAILY
Qty: 360 TABLET | Refills: 3 | Status: SHIPPED | OUTPATIENT
Start: 2023-09-25

## 2023-10-15 DIAGNOSIS — B00.1 RECURRENT COLD SORES: ICD-10-CM

## 2023-10-16 RX ORDER — VALACYCLOVIR HYDROCHLORIDE 1 G/1
2000 TABLET, FILM COATED ORAL 2 TIMES DAILY
Qty: 360 TABLET | Refills: 3 | OUTPATIENT
Start: 2023-10-16

## 2023-12-21 ENCOUNTER — OFFICE VISIT (OUTPATIENT)
Dept: URGENT CARE | Facility: URGENT CARE | Age: 28
End: 2023-12-21
Payer: COMMERCIAL

## 2023-12-21 VITALS
RESPIRATION RATE: 12 BRPM | BODY MASS INDEX: 31.07 KG/M2 | OXYGEN SATURATION: 96 % | TEMPERATURE: 99.2 F | HEART RATE: 87 BPM | SYSTOLIC BLOOD PRESSURE: 124 MMHG | DIASTOLIC BLOOD PRESSURE: 85 MMHG | WEIGHT: 159.1 LBS

## 2023-12-21 DIAGNOSIS — R07.0 THROAT PAIN: Primary | ICD-10-CM

## 2023-12-21 LAB
DEPRECATED S PYO AG THROAT QL EIA: NEGATIVE
GROUP A STREP BY PCR: NOT DETECTED

## 2023-12-21 PROCEDURE — 87651 STREP A DNA AMP PROBE: CPT | Performed by: PHYSICIAN ASSISTANT

## 2023-12-21 PROCEDURE — 99213 OFFICE O/P EST LOW 20 MIN: CPT | Performed by: PHYSICIAN ASSISTANT

## 2023-12-21 ASSESSMENT — ENCOUNTER SYMPTOMS
CARDIOVASCULAR NEGATIVE: 1
PALPITATIONS: 0
MYALGIAS: 0
VOMITING: 0
WOUND: 0
JOINT SWELLING: 0
HEADACHES: 0
ARTHRALGIAS: 0
ALLERGIC/IMMUNOLOGIC NEGATIVE: 1
SORE THROAT: 1
ENDOCRINE NEGATIVE: 1
NECK PAIN: 0
RHINORRHEA: 0
FEVER: 0
NECK STIFFNESS: 0
BACK PAIN: 0
LIGHT-HEADEDNESS: 0
WEAKNESS: 0
COUGH: 0
DIZZINESS: 0
DIARRHEA: 0
MUSCULOSKELETAL NEGATIVE: 1
CHILLS: 0
EYES NEGATIVE: 1
NAUSEA: 0
SHORTNESS OF BREATH: 0

## 2023-12-21 NOTE — PROGRESS NOTES
Chief Complaint:     Chief Complaint   Patient presents with    Pharyngitis     Onset sore throat yesterday, possible strep       Results for orders placed or performed in visit on 12/21/23   Streptococcus A Rapid Screen w/Reflex to PCR - Clinic Collect     Status: Normal    Specimen: Throat; Swab   Result Value Ref Range    Group A Strep antigen Negative Negative       Medical Decision Making:    Vital signs reviewed by Valeriano Harrell PA-C  /85   Pulse 87   Temp 99.2  F (37.3  C) (Tympanic)   Resp 12   Wt 72.2 kg (159 lb 1.6 oz)   LMP 12/21/2023 (Exact Date)   SpO2 96%   BMI 31.07 kg/m      Differential Diagnosis:  URI Adult/Peds:  Strep pharyngitis, Tonsilitis, Viral pharyngitis, and Viral syndrome        ASSESSMENT    1. Throat pain        PLAN    Patient is in no acute distress.    Temp is 99.2 in clinic today, lung sounds were clear, and O2 sats at 96% on RA.    RST was negative.  We will call with PCR results only if positive.  Rest, Push fluids, vaporizer, elevation of head of bed.  Ibuprofen and or Tylenol for any fever or body aches.  If symptoms worsen, recheck immediately otherwise follow up with your PCP in 1 week if symptoms are not improving.  Worrisome symptoms discussed with instructions to go to the ED.  Patient verbalized understanding and agreed with this plan.    Labs:    Results for orders placed or performed in visit on 12/21/23   Streptococcus A Rapid Screen w/Reflex to PCR - Clinic Collect     Status: Normal    Specimen: Throat; Swab   Result Value Ref Range    Group A Strep antigen Negative Negative        Vital signs reviewed by Valeriano Harrell PA-C  /85   Pulse 87   Temp 99.2  F (37.3  C) (Tympanic)   Resp 12   Wt 72.2 kg (159 lb 1.6 oz)   LMP 12/21/2023 (Exact Date)   SpO2 96%   BMI 31.07 kg/m      Current Meds      Current Outpatient Medications:     norethindrone (MICRONOR) 0.35 MG tablet, Take 1 tablet (0.35 mg) by mouth daily (Patient not taking: Reported  on 12/21/2023), Disp: 90 tablet, Rfl: 3    oxyCODONE (ROXICODONE) 5 MG tablet, Take 1-2 tablets (5-10 mg) by mouth every 4 hours as needed for moderate to severe pain, Disp: 12 tablet, Rfl: 0    valACYclovir (VALTREX) 1000 mg tablet, TAKE 2 TABLETS(2000 MG) BY MOUTH TWICE DAILY (Patient not taking: Reported on 12/21/2023), Disp: 360 tablet, Rfl: 3      Respiratory History    occasional episodes of bronchitis      SUBJECTIVE    HPI: Kd Garcia is an 28 year old female who presents with sore throat.  Symptoms began 1  days ago and has unchanged.  There is no shortness of breath, wheezing, and chest pain.  Patient is eating and drinking well.  No fever, nausea, vomiting, or diarrhea.    Patient denies any recent travel or exposure to known COVID positive tested individual.      ROS:     Review of Systems   Constitutional:  Negative for chills and fever.   HENT:  Positive for sore throat. Negative for congestion, ear pain and rhinorrhea.    Eyes: Negative.    Respiratory:  Negative for cough and shortness of breath.    Cardiovascular: Negative.  Negative for chest pain and palpitations.   Gastrointestinal:  Negative for diarrhea, nausea and vomiting.   Endocrine: Negative.    Genitourinary: Negative.    Musculoskeletal: Negative.  Negative for arthralgias, back pain, joint swelling, myalgias, neck pain and neck stiffness.   Skin: Negative.  Negative for rash and wound.   Allergic/Immunologic: Negative.  Negative for immunocompromised state.   Neurological:  Negative for dizziness, weakness, light-headedness and headaches.         Family History   Family History   Problem Relation Age of Onset    Unknown/Adopted Mother     C.A.D. Father 70        MI    Hypertension Father     Coronary Artery Disease Father     Cerebrovascular Disease Father     Diabetes No family hx of     Hyperlipidemia No family hx of     Breast Cancer No family hx of     Colon Cancer No family hx of     Prostate Cancer No family hx of     Other  Cancer No family hx of     Depression No family hx of     Anxiety Disorder No family hx of     Mental Illness No family hx of     Substance Abuse No family hx of     Anesthesia Reaction No family hx of     Asthma No family hx of     Osteoporosis No family hx of     Genetic Disorder No family hx of     Thyroid Disease No family hx of     Obesity No family hx of         Problem history  Patient Active Problem List   Diagnosis    Papanicolaou smear of cervix with atypical squamous cells of undetermined significance (ASC-US)    Supervision of normal intrauterine pregnancy in multigravida in third trimester    H/O cold sores    Constipation during pregnancy in third trimester     (normal spontaneous vaginal delivery)    Lactating mother    Anemia due to blood loss, acute        Allergies  Allergies   Allergen Reactions    Reglan [Metoclopramide] Anxiety        Social History  Social History     Socioeconomic History    Marital status: Single     Spouse name: Not on file    Number of children: 0    Years of education: Not on file    Highest education level: Not on file   Occupational History    Occupation: student   Tobacco Use    Smoking status: Former     Packs/day: 0     Types: Cigarettes    Smokeless tobacco: Never   Substance and Sexual Activity    Alcohol use: Not Currently    Drug use: No    Sexual activity: Yes     Partners: Male     Birth control/protection: None     Comment: none   Other Topics Concern    Parent/sibling w/ CABG, MI or angioplasty before 65F 55M? No   Social History Narrative    Not on file     Social Determinants of Health     Financial Resource Strain: Not on file   Food Insecurity: Not on file   Transportation Needs: Not on file   Physical Activity: Not on file   Stress: Not on file   Social Connections: Not on file   Interpersonal Safety: Not on file   Housing Stability: Not on file        OBJECTIVE     Vital signs reviewed by Valeriano Harrell PA-C  /85   Pulse 87   Temp 99.2  F  (37.3  C) (Tympanic)   Resp 12   Wt 72.2 kg (159 lb 1.6 oz)   LMP 12/21/2023 (Exact Date)   SpO2 96%   BMI 31.07 kg/m       Physical Exam      Valeriano Harrell PA-C  12/21/2023, 10:23 AM

## 2024-01-18 PROBLEM — Z87.59 HISTORY OF ECTOPIC PREGNANCY: Status: ACTIVE | Noted: 2022-06-21

## 2024-02-13 ENCOUNTER — VIRTUAL VISIT (OUTPATIENT)
Dept: OBGYN | Facility: CLINIC | Age: 29
End: 2024-02-13
Payer: COMMERCIAL

## 2024-02-13 ENCOUNTER — MYC MEDICAL ADVICE (OUTPATIENT)
Dept: OBGYN | Facility: CLINIC | Age: 29
End: 2024-02-13

## 2024-02-13 VITALS — HEIGHT: 60 IN | BODY MASS INDEX: 31.41 KG/M2 | WEIGHT: 160 LBS

## 2024-02-13 DIAGNOSIS — O36.80X0 PREGNANCY WITH INCONCLUSIVE FETAL VIABILITY: ICD-10-CM

## 2024-02-13 DIAGNOSIS — O09.90 SUPERVISION OF HIGH-RISK PREGNANCY: Primary | ICD-10-CM

## 2024-02-13 DIAGNOSIS — Z13.79 GENETIC SCREENING: ICD-10-CM

## 2024-02-13 PROCEDURE — 99207 PR NO CHARGE NURSE ONLY: CPT | Mod: 93

## 2024-02-13 NOTE — PROGRESS NOTES
"  SUBJECTIVE:     HPI:    This is a 28 year old female patient,  who presents for her first obstetrical visit.    NIKIA: 2024, by Last Menstrual Period.  She is 8w0d weeks.  Her cycles were historically regular-a little less so after her ectopic. Her last menstrual period was normal.   Since her LMP, she has experienced  nausea, fatigue, and lightheadedness).  Last week had some light spotting with mild intermittent cramps-both have resolved. Hx ectopic-reviewed s/s that would warrant emergent evaluation  Additional History: 3/2022 ectopic pregnancy with R laparoscopic salpingectomy  4 full term vaginal deliveries  Hyperemesis with all 4 pregnancies-will continue Vit B6 and Unisom-will call if unable to stay hydrated.  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  No symptoms.  Recommended Pa speak to Employee nehal at [facility]. Consider TB Gold.    .      She has a history of   ectopic pregnancy     Since her last LMP she denies use of alcohol, tobacco and street drugs.    Past medical, surgical, social and family history were reviewed and updated in Psychiatric.      Current Outpatient Medications "   Medication    valACYclovir (VALTREX) 1000 mg tablet     No current facility-administered medications for this visit.       ROS:   12 point review of systems negative other than symptoms noted below or in the HPI.  Constitutional: Fatigue  Gastrointestinal: Nausea  Neurologic: lightheadedness    Confirmed patient desires delivery at [facility] with the [facility] provider.    Nurse phone visit completed. Prenatal book and folder containing standard educational hand-outs and brochures will be given at the next visit to patient. Information in folder reviewed over the phone. Questions answered. Information given on optional screening available to assess chromosomal anomalies. Pt desires NIPS-ordered as misc lab order. Pt advised to call the clinic if she has any questions or concerns related to her pregnancy. Prenatal labs future ordered.   Raiza Ruff RN on 2/13/2024 at 2:22 PM      Lab Results   Component Value Date    PAP ASC-US 05/25/2016       PHQ-9 score:        2/13/2024     1:02 AM   PHQ   PHQ-9 Total Score 0   Q9: Thoughts of better off dead/self-harm past 2 weeks Not at all                   9/30/2019     9:20 AM 11/13/2020     4:40 PM 2/13/2024     1:01 AM   RACHELLE-7 SCORE   Total Score  0 (minimal anxiety) 0 (minimal anxiety)   Total Score 4 0 0           Patient supplied answers from flow sheet for:  Prenatal OB Questionnaire.  Past Medical History  Have you ever recieved care for your mental health? : No  Have you ever been in a major accident or suffered serious trauma?: No  Within the last year, has anyone hit, slapped, kicked or otherwise hurt you?: No  In the last year, has anyone forced you to have sex when you didn't want to?: No    Past Medical History 2   Have you ever received a blood transfusion?: No  Would you accept a blood transfusion if was medically recommended?: Yes  Does anyone in your home smoke?: (!) Yes   Is your blood type Rh negative?: Unknown  Have you  ever ?: (!) Yes  Have you been hospitalized for a nonsurgical reason excluding normal delivery?: No  Have you ever had an abnormal pap smear?: No    Past Medical History (Continued)  Do you have a history of abnormalities of the uterus?: No  Did your mother take KATEY or any other hormones when she was pregnant with you?: No  Do you have any other problems we have not asked about which you feel may be important to this pregnancy?: No                   OBJECTIVE:     EXAM:  Ht 1.524 m (5')   Wt 72.6 kg (160 lb)   LMP 12/19/2023   BMI 31.25 kg/m   Body mass index is 31.25 kg/m .

## 2024-02-13 NOTE — Clinical Note
FYI is coming on the 15th for US and then phone visit. New ob appts on the 28th Hx ectopic looking for reassurance

## 2024-02-13 NOTE — PATIENT INSTRUCTIONS
Learning About Pregnancy  Your Care Instructions     Your health in the early weeks of your pregnancy is particularly important for your baby's health. Take good care of yourself. Anything you do that harms your body can also harm your baby.  Make sure to go to all of your doctor appointments. Regular checkups will help keep you and your baby healthy.  How can you care for yourself at home?  Diet    Eat a balanced diet. Make sure your diet includes plenty of beans, peas, and leafy green vegetables.     Do not skip meals or go for many hours without eating. If you are nauseated, try to eat a small, healthy snack every 2 to 3 hours.     Do not eat fish that has a high level of mercury, such as shark, swordfish, or mackerel. Do not eat more than one can of tuna each week.     Drink plenty of fluids. If you have kidney, heart, or liver disease and have to limit fluids, talk with your doctor before you increase the amount of fluids you drink.     Cut down on caffeine, such as coffee, tea, and cola.     Do not drink alcohol, such as beer, wine, or hard liquor.     Take a multivitamin that contains at least 400 micrograms (mcg) of folic acid to help prevent birth defects. Fortified cereal and whole wheat bread are good additional sources of folic acid.     Increase the calcium in your diet. Try to drink a quart of skim milk each day. You may also take calcium supplements and choose foods such as cheese and yogurt.   Lifestyle    Make sure you go to your follow-up appointments.     Get plenty of rest. You may be unusually tired while you are pregnant.     Get at least 30 minutes of exercise on most days of the week. Walking is a good choice. If you have not exercised in the past, start out slowly. Take several short walks each day.     Do not smoke. If you need help quitting, talk to your doctor about stop-smoking programs. These can increase your chances of quitting for good.     Do not touch cat feces or litter boxes.  Also, wash your hands after you handle raw meat, and fully cook all meat before you eat it. Wear gloves when you work in the yard or garden, and wash your hands well when you are done. Cat feces, raw or undercooked meat, and contaminated dirt can cause an infection that may harm your baby or lead to a miscarriage.     Avoid things that can make your body too hot and may be harmful to your baby, such as a hot tub or sauna. Or talk with your doctor before doing anything that raises your body temperature. Your doctor can tell you if it's safe.     Avoid chemical fumes, paint fumes, or poisons.     Do not use illegal drugs, marijuana, or alcohol.   Medicines    Review all of your medicines with your doctor. Some of your routine medicines may need to be changed to protect your baby.     Use acetaminophen (Tylenol) to relieve minor problems, such as a mild headache or backache or a mild fever with cold symptoms. Do not use nonsteroidal anti-inflammatory drugs (NSAIDs), such as ibuprofen (Advil, Motrin) or naproxen (Aleve), unless your doctor says it is okay.     Do not take two or more pain medicines at the same time unless the doctor told you to. Many pain medicines have acetaminophen, which is Tylenol. Too much acetaminophen (Tylenol) can be harmful.     Take your medicines exactly as prescribed. Call your doctor if you think you are having a problem with your medicine.   To manage morning sickness    If you feel sick when you first wake up, try eating a small snack (such as crackers) before you get out of bed. Allow some time to digest the snack, and then get out of bed slowly.     Do not skip meals or go for long periods without eating. An empty stomach can make nausea worse.     Eat small, frequent meals instead of three large meals each day.     Drink plenty of fluids.     Eat foods that are high in protein but low in fat.     If you are taking iron supplements, ask your doctor if they are necessary. Iron can make  "nausea worse.     Avoid any smells, such as coffee, that make you feel sick.     Get lots of rest. Morning sickness may be worse when you are tired.   Follow-up care is a key part of your treatment and safety. Be sure to make and go to all appointments, and call your doctor if you are having problems. It's also a good idea to know your test results and keep a list of the medicines you take.  Where can you learn more?  Go to https://www.APROOFED.net/patiented  Enter E868 in the search box to learn more about \"Learning About Pregnancy.\"  Current as of: July 11, 2023               Content Version: 13.8    6582-8703 [employer].   Care instructions adapted under license by your healthcare professional. If you have questions about a medical condition or this instruction, always ask your healthcare professional. [employer] disclaims any warranty or liability for your use of this information.      Weeks 6 to 10 of Your Pregnancy: Care Instructions  During these weeks of pregnancy, your body goes through many changes. You may start to feel different, both in your body and your emotions. Each pregnancy is different, so there's no \"right\" way to feel. These early weeks are a time to make healthy choices for you and your pregnancy.    Take a daily prenatal vitamin. Choose one with folic acid in it.   Avoid alcohol, tobacco, and drugs (including marijuana). If you need help quitting, talk to your doctor.     Drink plenty of liquids.  Be sure to drink enough water. And limit sodas, other sweetened drinks, and caffeine.     Choose foods that are good sources of calcium, iron, and folate.  You can try dairy products, dark leafy greens, fortified orange juice and cereals, almonds, broccoli, dried fruit, and beans.     Avoid foods that may be harmful.  Don't eat raw meat, deli meat, raw seafood, or raw eggs. Avoid soft cheese and unpasteurized dairy, like Brie and blue cheese. And don't eat fish that " "contains a lot of mercury, like shark and swordfish.     Don't touch sonal litter or cat poop.  They can cause an infection that could be harmful during pregnancy.     Avoid things that can make your body too hot.  For example, avoid hot tubs and saunas.     Soothe morning sickness.  Try eating 5 or 6 small meals a day, getting some fresh air, or using jessica to control symptoms.     Ask your doctor about flu and COVID-19 shots.  Getting them can help protect against infection.   Follow-up care is a key part of your treatment and safety. Be sure to make and go to all appointments, and call your doctor if you are having problems. It's also a good idea to know your test results and keep a list of the medicines you take.  Where can you learn more?  Go to [email or web address]  Enter G112 in the search box to learn more about \"Weeks 6 to 10 of Your Pregnancy: Care Instructions.\"  Current as of: July 11, 2023               Content Version: 13.8 2006-2023 [employer].   Care instructions adapted under license by your healthcare professional. If you have questions about a medical condition or this instruction, always ask your healthcare professional. [employer] disclaims any warranty or liability for your use of this information.         Managing Morning Sickness (01:55)  Your health professional recommends that you watch this short online health video.  Learn tips for dealing with morning sickness, no matter what time of day you have it.  Purpose:  Gives tips for managing morning sickness, including eating small low-fat meals and avoiding caffeine and spicy food.  Goal:  The user will learn tips for dealing with morning sickness during pregnancy.     How to watch the video    Scan the QR code   OR Visit the website    [email or web address]   Current as of: July 11, 2023               Content Version: 13.8 2006-2023 [employer].   Care " instructions adapted under license by your healthcare professional. If you have questions about a medical condition or this instruction, always ask your healthcare professional. [employer] disclaims any warranty or liability for your use of this information.      Pregnancy and Heartburn: Care Instructions  Overview     Heartburn is a common problem during pregnancy.  Heartburn happens when stomach acid backs up into the tube that carries food to the stomach. This tube is called the esophagus. Early in pregnancy, heartburn is caused by hormone changes that slow down digestion. Later on, it's also caused by the large uterus pushing up on the stomach.  Even though you can't fix the cause, there are things you can do to get relief. Treating heartburn during pregnancy focuses first on making lifestyle changes, like changing what and how you eat, and on taking medicines.  Heartburn usually improves or goes away after childbirth.  Follow-up care is a key part of your treatment and safety. Be sure to make and go to all appointments, and call your doctor if you are having problems. It's also a good idea to know your test results and keep a list of the medicines you take.  How can you care for yourself at home?  Eat small, frequent meals.  Avoid foods that make your symptoms worse, such as chocolate, peppermint, and spicy foods. Avoid drinks with caffeine, such as coffee, tea, and sodas.  Avoid bending over or lying down after meals.  Take a short walk after you eat.  If heartburn is a problem at night, do not eat for 2 hours before bedtime.  Take antacids like Mylanta, Maalox, Rolaids, or Tums. Do not take antacids that have sodium bicarbonate, magnesium trisilicate, or aspirin. Be careful when you take over-the-counter antacid medicines. Many of these medicines have aspirin in them. While you are pregnant, do not take aspirin or medicines that contain aspirin unless your doctor says it is okay.  If you're not  "getting relief, talk to your doctor. You may be able to take a stronger acid-reducing medicine.  When should you call for help?   Call your doctor now or seek immediate medical care if:    You have new or worse belly pain.     You are vomiting.   Watch closely for changes in your health, and be sure to contact your doctor if:    You have new or worse symptoms of reflux.     You are losing weight.     You have trouble or pain swallowing.     You do not get better as expected.   Where can you learn more?  Go to https://www.Chujian.net/patiented  Enter U946 in the search box to learn more about \"Pregnancy and Heartburn: Care Instructions.\"  Current as of: July 11, 2023               Content Version: 13.8    8216-9826 [employer].   Care instructions adapted under license by your healthcare professional. If you have questions about a medical condition or this instruction, always ask your healthcare professional. [employer] disclaims any warranty or liability for your use of this information.      Constipation: Care Instructions  Overview     Constipation means that you have a hard time passing stools (bowel movements). People pass stools from 3 times a day to once every 3 days. What is normal for you may be different. Constipation may occur with pain in the rectum and cramping. The pain may get worse when you try to pass stools. Sometimes there are small amounts of bright red blood on toilet paper or the surface of stools. This is because of enlarged veins near the rectum (hemorrhoids).  A few changes in your diet and lifestyle may help you avoid ongoing constipation. Your doctor may also prescribe medicine to help loosen your stool.  Some medicines can cause constipation. These include pain medicines and antidepressants. Tell your doctor about all the medicines you take. Your doctor may want to make a medicine change to ease your symptoms.  Follow-up care is a key part of your treatment " "and safety. Be sure to make and go to all appointments, and call your doctor if you are having problems. It's also a good idea to know your test results and keep a list of the medicines you take.  How can you care for yourself at home?  Drink plenty of fluids. If you have kidney, heart, or liver disease and have to limit fluids, talk with your doctor before you increase the amount of fluids you drink.  Include high-fiber foods in your diet each day. These include fruits, vegetables, beans, and whole grains.  Get at least 30 minutes of exercise on most days of the week. Walking is a good choice. You also may want to do other activities, such as running, swimming, cycling, or playing tennis or team sports.  Take a fiber supplement, such as Citrucel or Metamucil, every day. Read and follow all instructions on the label.  Schedule time each day for a bowel movement. A daily routine may help. Take your time having a bowel movement, but don't sit for more than 10 minutes at a time. And don't strain too much.  Support your feet with a small step stool when you sit on the toilet. This helps flex your hips and places your pelvis in a squatting position.  Your doctor may recommend an over-the-counter laxative to relieve your constipation. Examples are Milk of Magnesia and MiraLax. Read and follow all instructions on the label. Do not use laxatives on a long-term basis.  When should you call for help?   Call your doctor now or seek immediate medical care if:    You have new or worse belly pain.     You have new or worse nausea or vomiting.     You have blood in your stools.   Watch closely for changes in your health, and be sure to contact your doctor if:    Your constipation is getting worse.     You do not get better as expected.   Where can you learn more?  Go to https://www.healthwise.net/patiented  Enter P343 in the search box to learn more about \"Constipation: Care Instructions.\"  Current as of: March 21, " "2023               Content Version: 13.8 2006-2023 [employer].   Care instructions adapted under license by your healthcare professional. If you have questions about a medical condition or this instruction, always ask your healthcare professional. [employer] disclaims any warranty or liability for your use of this information.      Learning About High-Iron Foods  What foods are high in iron?     The foods you eat contain nutrients, such as vitamins and minerals. Iron is a nutrient. Your body needs the right amount to stay healthy and work as it should. You can use the list below to help you make choices about which foods to eat.  Here are some foods that contain iron. They have 1 to 2 milligrams of iron per serving.  Fruits  Figs (dried), 5 figs  Vegetables  Asparagus (canned), 6 bueno  Roman, beet, Swiss chard, or turnip greens, 1 cup  Dried peas, cooked,   cup  Seaweed, spirulina (dried),   cup  Spinach, (cooked)   cup or (raw) 1 cup  Grains  Cereals, fortified with iron, 1 cup  Grits (instant, cooked), fortified with iron,   cup  Meats and other protein foods  Beans (kidney, lima, navy, white), canned or cooked,   cup  Beef or lamb, 3 oz  Chicken giblets, 3 oz  Chickpeas (garbanzo beans),   cup  Liver of beef, lamb, or pork, 3 oz  Oysters (cooked), 3 oz  Sardines (canned), 3 oz  Soybeans (boiled),   cup  Tofu (firm),   cup  Work with your doctor to find out how much of this nutrient you need. Depending on your health, you may need more or less of it in your diet.  Where can you learn more?  Go to [email or web address]/[email or web address]  Enter R005 in the search box to learn more about \"Learning About High-Iron Foods.\"  Current as of: February 28, 2023               Content Version: 13.8 2006-2023 [employer].   Care instructions adapted under license by your healthcare professional. If you have questions about a medical condition or this instruction, always ask your " "healthcare professional. [employer], [facility] disclaims any warranty or liability for your use of this information.      Rh Antibodies Screening During Pregnancy: About This Test  What is it?     The Rh antibodies screening test is a blood test. It checks your blood for Rh antibodies. If you have Rh-negative blood and have been exposed to Rh-positive blood, your immune system may make antibodies to attack the Rh-positive blood. When a pregnant woman has these antibodies, it is called Rh sensitization.  Why is this test done?  The Rh antibodies screening test is done during pregnancy to find out if your baby is at risk for Rh disease. This can happen if you have Rh-negative blood and your baby has Rh-positive blood. If your Rh-negative blood mixes with Rh-positive blood, your immune system will make antibodies to attack the Rh-positive blood.  During pregnancy, these antibodies could attach to the baby's red blood cells. This can cause your baby to have serious health problems. The results of this test will help your doctor know how to best care for you and your baby during your pregnancy.  How do you prepare for the test?  In general, there's nothing you have to do before this test, unless your doctor tells you to.  How is the test done?  A health professional uses a needle to take a blood sample, usually from the arm.  What happens after the test?  You will probably be able to go home right away. It depends on the reason for the test.  You can go back to your usual activities right away.  Follow-up care is a key part of your treatment and safety. Be sure to make and go to all appointments, and call your doctor if you are having problems. It's also a good idea to keep a list of the medicines you take. Ask your doctor when you can expect to have your test results.  Where can you learn more?  Go to [email or web address].[employer].net/[email or web address]  Enter P722 in the search box to learn more about \"Rh Antibodies Screening " "During Pregnancy: About This Test.\"  Current as of: 2023               Content Version: 13.8    0924-0258 [employer].   Care instructions adapted under license by your healthcare professional. If you have questions about a medical condition or this instruction, always ask your healthcare professional. [employer] disclaims any warranty or liability for your use of this information.      Learning About Preventing Rh Disease  What is Rh disease?     Rh disease can be a serious problem in pregnancy. It happens when substances called antibodies in the mother's blood cause red blood cells in her baby's blood to be destroyed. This can occur when the blood types of a mother and her baby do not match.  All blood has an Rh factor. This is what makes a blood type positive or negative. When you are Rh-negative, your baby may be Rh-negative or Rh-positive. If your baby has Rh-positive blood and it mixes with yours, your body will make antibodies. This is called Rh sensitization.  Most of the time, this is not a problem in a first pregnancy. But in future pregnancies, it could cause Rh disease.  A  with Rh disease has mild anemia and may have jaundice. In severe cases, anemia, jaundice, and swelling can be very dangerous or fatal. Some babies need to be delivered early. Some need special care in the NICU. A very sick baby will need a blood transfusion before or after birth.  Fortunately, Rh sensitization is usually easy to prevent.  That's why it's important to get your Rh status checked in your first trimester. It doesn't cause any warning signs. A blood test is the only way to know if you are Rh-sensitive or are at risk for it.  How can you prevent Rh disease?  If you are Rh-negative, your doctor gives you an Rh immune globulin shot (such as RhoGAM). It helps prevent your body from making the antibodies that attack your baby's red blood cells.  Timing is important. You need the shot " "at certain times during your pregnancy. And you need one anytime there is a chance that your baby's blood might mix with yours. That can happen with certain prenatal tests or when you have pregnancy bleeding, such as:  Right after any pregnancy loss, amniocentesis, or CVS testing.  After turning of a breech baby.  Before and maybe after childbirth. Your doctor gives you a shot around week 28. If your  is Rh-positive, you will have another shot.  Follow-up care is a key part of your treatment and safety. Be sure to make and go to all appointments, and call your doctor if you are having problems. It's also a good idea to know your test results and keep a list of the medicines you take.  Where can you learn more?  Go to [email or web address]  Enter W177 in the search box to learn more about \"Learning About Preventing Rh Disease.\"  Current as of: 2023               Content Version: 13.8    7783-1580 [employer].   Care instructions adapted under license by your healthcare professional. If you have questions about a medical condition or this instruction, always ask your healthcare professional. [employer] disclaims any warranty or liability for your use of this information.      Learning About Rh Immunoglobulin Shots  Introduction     An Rh immunoglobulin shot is given to pregnant women who have Rh-negative blood.  You may have Rh-negative blood, and your baby may have Rh-positive blood. If the two types of blood mix, your body will make antibodies. This is called Rh sensitization. Most of the time, this is not a problem the first time you're pregnant. But it could cause problems in future pregnancies.  This shot keeps your body from making the antibodies. You get the shot around 28 weeks of pregnancy. After the birth, your baby's blood is tested. If the blood is Rh positive, you will get another shot. You may also get the shot if you have vaginal bleeding while " "you are pregnant or if you have a miscarriage. These shots protect future pregnancies.  Women with Rh negative blood will need this shot each time they get pregnant.  Example  Rh immunoglobulin (HypRho-D, MICRhoGAM, and RhoGAM)  Possible side effects  Rare side effects may include:  Some mild pain where you got the shot.  A slight fever.  An allergic reaction.  You may have other side effects not listed here. Check the information that comes with your medicine.  What to know about taking this medicine  You may need more than one shot. You may need the shot again:  After amniocentesis, fetal blood sampling, or chorionic villus sampling tests.  If you have bleeding in your second or third trimester.  After turning of a breech baby.  After an injury to the belly while you are pregnant.  After a miscarriage or an .  Before or right after treatment for an ectopic or a partial molar pregnancy.  Tell your doctor if you have any allergies or have had a bad response to medicines in the past.  If you get this shot within 3 months of getting a live-virus vaccine, the vaccine may not work. Your doctor will tell you if you need more vaccine.  Check with your doctor or pharmacist before you use any other medicines. This includes over-the-counter medicines. Make sure your doctor knows all of the medicines, vitamins, herbs, and supplements you take. Taking some medicines at the same time can cause problems.  Where can you learn more?  Go to https://www.[employer]all.net/[email or web address]  Enter V615 in the search box to learn more about \"Learning About Rh Immunoglobulin Shots.\"  Current as of: 2023               Content Version: 13.8    2112-1992 [employer].   Care instructions adapted under license by your healthcare professional. If you have questions about a medical condition or this instruction, always ask your healthcare professional. [employer] disclaims any warranty or liability for your use " of this information.      Rubella (Norwegian Measles): Care Instructions  Overview  Rubella, also called Norwegian measles or 3-day measles, is a disease caused by a virus. It spreads by coughs, sneezes, and close contact. Rubella usually is mild and does not cause long-term problems. But if you are pregnant and get it, you can give the disease to your unborn baby. This can cause serious birth defects.  While you have rubella, you may get a rash and a mild fever, and the lymph glands in your neck may swell. Older children often have a fever, eye pain, a sore throat, and body aches. You can relieve most symptoms with care at home. Avoid being around others, especially pregnant people, until your rash has been gone for at least 4 days. People who have not had this disease before or have not had the vaccine have the greatest chance of getting the virus.  Follow-up care is a key part of your treatment and safety. Be sure to make and go to all appointments, and call your doctor if you are having problems. It's also a good idea to know your test results and keep a list of the medicines you take.  How can you care for yourself at home?  Drink plenty of fluids. If you have kidney, heart, or liver disease and have to limit fluids, talk with your doctor before you increase the amount of fluids you drink.  Get plenty of rest to help your body heal.  Take an over-the-counter pain medicine, such as acetaminophen (Tylenol), ibuprofen (Advil, Motrin), or naproxen (Aleve), to reduce fever and discomfort. Read and follow all instructions on the label. Do not give aspirin to anyone younger than 20. It has been linked to Reye syndrome, a serious illness.  Do not take two or more pain medicines at the same time unless the doctor told you to. Many pain medicines have acetaminophen, which is Tylenol. Too much acetaminophen (Tylenol) can be harmful.  Try not to scratch the rash. Put cold, wet cloths on the rash to reduce itching.  Do not smoke.  "Smoking can make your symptoms worse. If you need help quitting, talk to your doctor about stop-smoking programs and medicines. These can increase your chances of quitting for good.  Avoid contact with people who have never had rubella and who have not been immunized.  When should you call for help?   Call your doctor now or seek immediate medical care if:    You have a fever with a stiff neck or a severe headache.     You are sensitive to light or feel very sleepy or confused.   Watch closely for changes in your health, and be sure to contact your doctor if:    You do not get better as expected.   Where can you learn more?  Go to https://www.Tunespeak.net/patiented  Enter B812 in the search box to learn more about \"Rubella (Romansh Measles): Care Instructions.\"  Current as of: 2023               Content Version: 13.8    4786-8226 [employer].   Care instructions adapted under license by your healthcare professional. If you have questions about a medical condition or this instruction, always ask your healthcare professional. [employer] disclaims any warranty or liability for your use of this information.      Gonorrhea and Chlamydia: About These Tests  What is it?  These tests use a sample of urine or other body fluid to look for the bacteria that cause these sexually transmitted infections (STIs). The fluid sample can come from the cervix, vagina, rectum, throat, or eyes.  Why is this test done?  These tests may be done to:  Find out if symptoms are caused by gonorrhea or chlamydia.  Check people who are at high risk of being infected with gonorrhea or chlamydia.  Retest people several months after they have been treated for gonorrhea or chlamydia.  Check for infection in your  if you had a gonorrhea or chlamydia infection at the time of delivery.  How can you prepare for the test?  If you are going to have a urine test, do not urinate for at least 1 hour before the " "test.  If you think you may have chlamydia or gonorrhea, don't have sexual intercourse until you get your test results. And you may want to have tests for other STIs, such as HIV.  How is the test done?  For a direct sample, a swab is used to collect body fluid from the cervix, vagina, rectum, throat, or eyes. Your doctor may collect the sample. Or you may be given instructions on how to collect your own sample.  For a urine sample, you will collect the urine that comes out when you first start to urinate. Don't wipe the genital area clean before you urinate.  How long does the test take?  The test will take a few minutes.  What happens after the test?  You will be able to go home right away.  You can go back to your usual activities right away.  If you do have an infection, don't have sexual intercourse for 7 days after you start treatment. And your sex partner(s) should also be treated.  Follow-up care is a key part of your treatment and safety. Be sure to make and go to all appointments, and call your doctor if you are having problems. It's also a good idea to keep a list of the medicines you take. Ask your doctor when you can expect to have your test results.  Where can you learn more?  Go to https://www.SmartSynch.net/patiented  Enter K976 in the search box to learn more about \"Gonorrhea and Chlamydia: About These Tests.\"  Current as of: April 19, 2023               Content Version: 13.8    [id number] [employer].   Care instructions adapted under license by your healthcare professional. If you have questions about a medical condition or this instruction, always ask your healthcare professional. "[employer] disclaims any warranty or liability for your use of this information.      Trichomoniasis: About This Test  What is it?     This test uses a sample of urine or other body fluid to look for the tiny parasite that causes trichomoniasis (also called trich). The fluid sample can come " from the vagina, cervix, or urethra. Your doctor may choose to use one or more of many available tests.  Why is it done?  A trich test may be done to:  Find out if symptoms are caused by trich.  Check people who are at high risk for being infected with trich.  Check after treatment to make sure that the infection is gone.  How do you prepare for the test?  If you are going to have a urine test, do not urinate for at least 1 hour before the test.  How is the test done?  For a direct sample, a swab is used to collect body fluid from the cervix, vagina, or urethra. Your doctor may collect the sample. Or you may be given instructions on how to collect your own sample.  For a urine sample, you will collect the urine that comes out when you first start to urinate. Don't wipe the area clean before you urinate.  How long does the test take?  It will take a few minutes to collect a sample.  What happens after the test?  You can go home right away.  You can go back to your usual activities right away.  You may get the test results the same day or several days later. It depends on the test used.  If you do have an infection, don't have sexual intercourse for 7 days after you start treatment. Your sex partner(s) should also be treated.  Follow-up care is a key part of your treatment and safety. Be sure to make and go to all appointments, and call your doctor if you are having problems. Ask your doctor when you can expect to have your test results.  Current as of: April 19, 2023               Content Version: 13.8    5570-7461 [employer].   Care instructions adapted under license by your healthcare professional. If you have questions about a medical condition or this instruction, always ask your healthcare professional. [employer] disclaims any warranty or liability for your use of this information.      HIV Testing: Care Instructions  Overview  You can get tested for the human immunodeficiency virus  "(HIV). Most doctors use a blood test to check for HIV antibodies and antigens in your blood. It may also check for the genetic material (RNA) of HIV. Some tests use saliva to check for HIV antibodies. But these aren't as accurate. For example, they may give a false result if you've just been infected.  What do the results mean?    Normal (negative)    No HIV antibodies, antigens, or RNA were found.  You may need more testing. It can make sure your test results are correct.    Uncertain (indeterminate)    Test results didn't clearly show if you have an HIV infection.  HIV antibodies or antigens may not have formed yet.  Some other type of antibody or antigen may have affected the results.  You will need another test to be sure.    Abnormal (positive)    HIV antibodies, antigens, or RNA were found.  If you haven't had an RNA test yet, one will be done. If it's positive, you have HIV.  If your test result is positive, your doctor will talk to you. You will discuss starting treatment.  Follow-up care is a key part of your treatment and safety. Be sure to make and go to all appointments, and call your doctor if you are having problems. It's also a good idea to know your test results and keep a list of the medicines you take.  Where can you learn more?  Go to https://www.Smava.net/patiented  Enter T792 in the search box to learn more about \"HIV Testing: Care Instructions.\"  Current as of: June 13, 2023               Content Version: 13.8    1198-6486 [employer].   Care instructions adapted under license by your healthcare professional. If you have questions about a medical condition or this instruction, always ask your healthcare professional. [employer] disclaims any warranty or liability for your use of this information.      Hepatitis C Virus Tests: About These Tests  What are they?     Hepatitis C virus tests are blood tests that check for substances in the blood that show whether you " "have hepatitis C now or had it in the past. The tests can also tell you what type of hepatitis C you have and how severe the disease is. This can help your doctor with treatment.  If the tests show that you have long-term hepatitis C, you need to take steps to prevent spreading the disease.  Why are these tests done?  You may need these tests if:  You have symptoms of hepatitis.  You may have been exposed to the virus. You are more likely to have been exposed to the virus if you inject drugs or are exposed to body fluids (such as if you are a health care worker).  You've had other tests that show you have liver problems.  You are 18 to 79 years old.  You have an HIV infection.  The tests also are done to help your doctor decide about your treatment and see how well it works.  How do you prepare for the test?  In general, there's nothing you have to do before this test, unless your doctor tells you to.  How is the test done?  A health professional uses a needle to take a blood sample, usually from the arm.  What happens after these tests?  You will probably be able to go home right away.  You can go back to your usual activities right away.  Follow-up care is a key part of your treatment and safety. Be sure to make and go to all appointments, and call your doctor if you are having problems. It's also a good idea to keep a list of the medicines you take. Ask your doctor when you can expect to have your test results.  Where can you learn more?  Go to https://www.Money Toolkit.net/patiented  Enter W551 in the search box to learn more about \"Hepatitis C Virus Tests: About These Tests.\"  Current as of: June 13, 2023               Content Version: 13.8    [id number] [employer].   Care instructions adapted under license by your healthcare professional. If you have questions about a medical condition or this instruction, always ask your healthcare professional. [employer] disclaims any warranty or " liability for your use of this information.      Learning About Fetal Ultrasound Results  What is a fetal ultrasound?     Fetal ultrasound is a test that lets your doctor see an image of your baby. Your doctor learns information about your baby from this picture. You may find out, for example, if you are having a boy or a girl. But the main reason you have this test is to get information about your baby's health.  (You may hear your baby called a fetus. This is a common medical term for a baby that's growing in the mother's uterus.)  What kind of information can you learn from this test?  The findings of an ultrasound fall into two categories, normal and abnormal.  Normal  The fetus is the right size for its age.  The placenta is the expected size and does not cover the cervix.  There is enough amniotic fluid in the uterus.  No birth defects can be seen.  Abnormal  The fetus is small or large for its age.  The placenta covers the cervix.  There is too much or too little amniotic fluid in the uterus.  The fetus may have a birth defect.  What does an abnormal result mean?  Abnormal seems to imply that something is wrong with your baby. But what it means is that the test has shown something the doctor wants to take a closer look at.  And that's what happens next. Your doctor will talk to you about what further test or tests you may need.  What do the results mean?  Some of the things your doctor may see on an abnormal ultrasound include:  Echogenic bowel.  The bowel looks very bright on the screen. This could mean that there's blood in the bowel. Or it could mean that something is blocking the small bowel.  Increased nuchal translucency.  The ultrasound measures the thickness at the back of the baby's neck. An increase in thickness is sometimes an early sign of Down syndrome.  Increased or decreased amniotic fluid.  The doctor will look for a reason for the level of amniotic fluid and will watch the pregnancy closely  "as it progresses.  Large ventricles.  Ventricles in the brain look larger than they should. Your doctor may take a closer look at the brain.  Renal pyelectasis/hydronephrosis.  The ultrasound measures the fluid around the kidney. If there is more fluid than expected, there is a chance of urinary tract or kidney problems.  Short long bones.  The ultrasound measures certain arm and leg bones. A long bone (humerus or femur) that is shorter than average could be a sign of Down syndrome.  Subchorionic hemorrhage.  An ultrasound can show bleeding under one of the membranes that surrounds the fetus. Some women don't have symptoms of bleeding. The ultrasound can find this problem when women are not bleeding from their vagina. Women who have this condition have a slightly higher chance of miscarriage.  What do you do now?  Take a deep breath, and let it out. Keep in mind that an abnormal finding on an ultrasound, after it's coupled with more information, may:  Turn out to be nothing.  Turn out to be something mild that won't affect the baby.  Turn out to be something more serious. But if this happens, early diagnosis helps you and your doctor plan treatment options sooner rather than later.  Your medical team is there for you. So are your family and friends. Ask questions, and get the help and support you need.  Follow-up care is a key part of your treatment and safety. Be sure to make and go to all appointments, and call your doctor if you are having problems. It's also a good idea to know your test results and keep a list of the medicines you take.  Where can you learn more?  Go to https://www.Boston Logic.net/patiented  Enter K451 in the search box to learn more about \"Learning About Fetal Ultrasound Results.\"  Current as of: July 11, 2023               Content Version: 13.8    9355-2531 [employer].   Care instructions adapted under license by your healthcare professional. If you have questions about a medical " condition or this instruction, always ask your healthcare professional. Healthwise, [facility] disclaims any warranty or liability for your use of this information.      Learning About Prenatal Visits  Overview     Regular prenatal visits are very important during any pregnancy. These quick office visits may seem simple and routine. But they can help you have a safe and healthy pregnancy. Your doctor is watching for problems that can only be found through regular checkups. The visits also give you and your doctor time to build a good relationship.  It's common to see your doctor every 4 weeks until week 28 of pregnancy. Then the visits will happen more often. From weeks 28 to 36, it's common to have visits every 2 to 3 weeks. In the final month of pregnancy, you likely will see your doctor every week. Your doctor may want to see you more or less often, depending on your health, your age, and if you've had a normal, full-term pregnancy before.  At different times in your pregnancy, you will have exams and tests. Some are routine. Others are done only when there is a chance of a problem. Everything healthy you do for your body helps you have a healthy pregnancy. Rest when you need it. Eat well, drink plenty of water, and exercise regularly.  What happens during a prenatal visit?  You will have blood pressure checks, along with urine tests. You also may have blood tests. If you need to go to the bathroom while waiting for the doctor, tell the nurse. You will be given a sample cup so your urine can be tested.  You will be weighed and have your belly measured.  Your doctor may listen to the fetal heartbeat with a special device.  At about 24 weeks, and possibly earlier in your pregnancy, your doctor will check your blood sugar (glucose tolerance test) for diabetes that can occur during pregnancy. This is gestational diabetes, which can be harmful.  You will have tests to check for infections that could harm your  ". These include group B streptococcus and hepatitis B.  Your doctor may do ultrasounds to check for problems. This also checks the position of the fetus. An ultrasound uses sound waves to produce a picture of the fetus.  You may get your vaccines updated.  Your doctor may ask you questions to check for signs of anxiety or depression. Tell your doctor if you feel sad, anxious, or hopeless for more than a few days.  You may have other tests at any time during your pregnancy.  Use your visits to discuss with your doctor any concerns you have.  How can you care for yourself at home?  Get plenty of rest.  Try to exercise every day, if your doctor says it is okay. If you have not exercised in the past, start out slowly. For example, you can take short walks each day.  Choose healthy foods, such as fruits, vegetables, whole grains, lean proteins, low-fat dairy, and healthy fats.  Drink plenty of fluids. Cut down on drinks with caffeine, such as coffee, tea, and cola. If you have kidney, heart, or liver disease and have to limit fluids, talk with your doctor before you increase the amount of fluids you drink.  Try to avoid chemical fumes, paint fumes, and poisons.  If you smoke, vape, or use alcohol, marijuana, or other drugs, quit or cut back as much as you can. Talk to your doctor if you need help quitting.  Review all of your medicines, including over-the-counter medicines and supplements, with your doctor. Some of your routine medicines may need to be changed. Do not stop or start taking any medicines without talking to your doctor first.  Follow-up care is a key part of your treatment and safety. Be sure to make and go to all appointments, and call your doctor if you are having problems. It's also a good idea to know your test results and keep a list of the medicines you take.  Where can you learn more?  Go to https://www.healthwise.net/patiented  Enter J502 in the search box to learn more about \"Learning About " "Prenatal Visits.\"  Current as of: July 11, 2023               Content Version: 13.8    3491-8394 [employer].   Care instructions adapted under license by your healthcare professional. If you have questions about a medical condition or this instruction, always ask your healthcare professional. [employer] disclaims any warranty or liability for your use of this information.      Intimate Partner Violence: Care Instructions  Overview     If you want to save this information but don't think it is safe to take it home, see if a trusted friend can keep it for you. Plan ahead. Know who you can call for help, and memorize the phone number.   Be careful online too. Your online activity may be seen by others. Do not use your personal computer or device to read about this topic. Use a safe computer, such as one at work, a friend's home, or a library.    Intimate partner violence--a type of domestic abuse--is different from an argument now and then. It is a pattern of abuse that one person may use to control another person's behavior. It may start with threats and name-calling. Then, it may lead to more serious acts, like pushing and slapping. The abuse also may occur in other areas. For example, the abuser may withhold money or spend a partner's money without their knowledge.  Abuse can cause serious harm. You are more likely to have a long-term health problem from the injuries and stress of living in a violent relationship. People who are sexually abused by their partners have more sexually transmitted infections and unplanned pregnancies. Anyone who is abused also faces emotional pain. Anyone can be abused in relationships. In some relationships, both people use abusive behavior.  If you are pregnant, abuse can cause problems such as poor weight gain, infections, and bleeding. Abuse during this time may increase your baby's risk of low birth weight, premature birth, and death.  Follow-up care is a " "key part of your treatment and safety. Be sure to make and go to all appointments, and call your doctor if you are having problems. It's also a good idea to know your test results and keep a list of the medicines you take.  How can you care for yourself at home?  If you do not have a safe place to stay, discuss this with your doctor before you leave.  Have a plan for where to go, how to leave your home, and where to stay in case of an emergency. Do not tell your partner about your plan. Contact:  The National Domestic Violence Hotline toll-free at 1-176.900.1376. They can help you find resources in your area.  Your local police department, hospital, or clinic for information about shelters and safe homes near you.  Talk to a trusted friend or neighbor, a counselor, or a migue leader. Do not feel that you have to hide what happened.  Teach your children how to call for help in an emergency.  Be alert to warning signs, such as threats, heavy alcohol use, or drug use. This can help you avoid danger.  If you can, make sure that there are no guns or other weapons in your home.  When should you call for help?   Call 911 anytime you think you may need emergency care. For example, call if:    You or someone else has just been abused.     You think you or someone else is in danger of being abused.   Watch closely for changes in your health, and be sure to contact your doctor if you have any problems.  Where can you learn more?  Go to https://www.Shopistan.net/patiented  Enter G282 in the search box to learn more about \"Intimate Partner Violence: Care Instructions.\"  Current as of: June 25, 2023               Content Version: 13.8    9560-7953 [employer].   Care instructions adapted under license by your healthcare professional. If you have questions about a medical condition or this instruction, always ask your healthcare professional. [employer] disclaims any warranty or liability for your use " of this information.      Intimate Partner Violence Safety Instructions: Care Instructions  Overview     If you want to save this information but don't think it is safe to take it home, see if a trusted friend can keep it for you. Plan ahead. Know who you can call for help, and memorize the phone number.   Be careful online too. Your online activity may be seen by others. Do not use your personal computer or device to read about this topic. Use a safe computer, such as one at work, a friend's home, or a library.    When you are abused by a spouse or partner, you can take actions to protect yourself and your children.  You can increase your safety whether you decide to stay with your spouse or partner or you decide to leave. You may want to make a safety plan and pack a bag ahead of time. This will help you leave quickly when you decide to. Remember, you cannot change your partner's actions, but you can find help for you and your children. No one deserves to be abused.  Follow-up care is a key part of your treatment and safety. Be sure to make and go to all appointments, and call your doctor if you are having problems. It's also a good idea to know your test results and keep a list of the medicines you take.  How can you care for yourself at home?  Make a plan for your safety   If you decide to stay with your abusive spouse or partner, you can do the following to increase your safety:  Decide what works best to keep you safe in an emergency.  Know who you can call to help you in an emergency.  Decide if you will call the police if you get hurt again. If you can, agree on a signal with your children or neighbor to call the police for you if you need help. You can flash lights or hang something out of a window.  Choose a safe place to go for a short time if you need to leave home. Memorize the address and phone number.  Learn escape routes out of your home in case you need to leave in a hurry. Teach your children  different ways to get out of your home quickly if they need to.  If you can, hide or lock up things that can be used as weapons (guns, knives, hammers).  Learn the number of a domestic violence shelter. Talk to the people there about how they can help.  Find out about other community resources that can help you.  Take pictures of bruises or other injuries if you can. You can also take pictures of things your abuser has broken.  Teach your children that violence is never okay. Tell them that they do not deserve to be hurt.  Pack a bag   Prepare a bag with things you will need if you leave suddenly. Leave it with a friend, a relative, or someone else you trust. You could include the following items in the bag:  A set of keys to your home and car.  Emergency phone numbers and addresses.  Money such as cash or checks. You can also ask a friend, a relative, or someone else you trust to hold money for you.  Copies of legal documents such as house and car titles or rent receipts, birth certificates, Social Security card, voter registration, marriage and 's licenses, and your children's health records.  Personal items you would need for a few days, such as clothes, a toothbrush, toothpaste, and any medicines you or your children need.  A favorite toy or book for your child or children.  Diapers and bottles, if you have very young children.  Pictures that show signs of abuse and violence. You may also add pictures of your abuser.  If you leave   If you decide to leave, you can take the following steps:  Go to the emergency room at a hospital if you have been hurt.  Think about asking the police to be with you as you leave. They can protect you as you leave your home.  If you decide to leave secretly, remember that activities can be tracked. Your abuser may still have access to your cell phone, email, and credit cards. It may be possible for these to be traced. Always be aware of your surroundings.  If this is an  "emergency, do not worry about gathering up anything. Just leave--your safety is most important.  If your abuser moves out, change the locks on the doors. If you have a security system, change the access code.  When should you call for help?   Call 911 anytime you think you may need emergency care. For example, call if:    You or someone else has just been abused.     You think you or someone else is in danger of being abused.   Watch closely for changes in your health, and be sure to contact your doctor if you have any problems.  Where can you learn more?  Go to https://www.Physcient.net/patiented  Enter A752 in the search box to learn more about \"Intimate Partner Violence Safety Instructions: Care Instructions.\"  Current as of: June 25, 2023               Content Version: 13.8    8089-8952 [employer].   Care instructions adapted under license by your healthcare professional. If you have questions about a medical condition or this instruction, always ask your healthcare professional. [employer] disclaims any warranty or liability for your use of this information.      Learning About Intimate Partner Violence  What is intimate partner violence?  Intimate partner violence is a type of domestic abuse. It's threatening, emotionally harmful, or violent behavior in a personal relationship. It can happen between past or current partners or spouses. In some relationships both people abuse each other. One partner may be more abusive. Or the abuse may be equal.  Abuse can affect people of any ethnic group, race, or Ep[employer]alian. It can affect teens, adults, or the elderly. And it can happen to people of any sexual orientation, gender, or social status.  Abusers use fear, bullying, and threats to control their partners. They may control what their partners do. They may control where their partners go or who they see. They may act jealous, controlling, or possessive. These early signs of abuse may " happen soon after the start of the relationship. Sometimes it can be hard to notice abuse at first. But after the relationship becomes more serious, the abuse may get worse.  If you are being abused in your relationship, it's important to get help. The abuse is not your fault. You don't have to face it alone.  Be careful  It may not be safe to take home domestic abuse information like this handout. Some people ask a trusted friend to keep it for them. It's also important to plan ahead and to memorize the phone number of places you can go for help. If you are concerned about your safety, do not use your computer, smartphone, or tablet to read about domestic abuse.   What are the types of intimate partner violence?  Abuse can happen in different ways. Each type can happen on its own or in combination with others.  Emotional abuse  Emotional abuse is a pattern of threats, insults, or controlling behavior. It includes verbal abuse. It goes beyond healthy disagreements in a relationship. It's a sign of an unhealthy relationship.  Do you feel threatened, intimidated, or controlled?  Does your partner:  Threaten your children, other family members, or pets?  Use jokes meant to embarrass or shame you?  Call you names?  Tell you that you are a bad parent?  Threaten to take away your children?  Threaten to have you or your family members deported?  Control your access to money or other basic needs?  Control what you do, who you see or talk to, or where you go?  Another form of emotional abuse is denying that it is happening. Or the abuser may act like the abuse is no big deal or is your fault.  Sexual abuse  With sexual abuse, abusers may try to convince or force you to have sex. They may force you into sex acts you're not comfortable with. Or they may sexually assault you. Sexual abuse can happen even if you are in a committed relationship.  Physical abuse  Physical abuse means that a partner hits, kicks, or does something  else to physically hurt you. Physical abuse that starts with a slap might lead to kicking, shoving, and choking over time. The abuser may also threaten to hurt or kill you.  Stalking  Stalking means that an abuser gives you attention that you do not want and that causes you fear. Examples of stalking include:  Following you.  Showing up at places where the abuser isn't invited, such as at your work or school.  Constantly calling or texting you.  What problems can  to?  Intimate partner violence can be very dangerous. It can cause serious, repeated injury. It can even lead to death.  All forms of abuse can cause long-term health problems from the stress of a violent relationship. Verbal abuse can lead to sexual and physical abuse.  Abuse causes:  Emotional pain.  Depression.  Anxiety.  Post-traumatic stress.  Sexual abuse can lead to sexually transmitted infections (such as HIV/AIDS) and unplanned pregnancy.  Pregnancy can be a very dangerous time for people in abusive relationships. Abuse can cause or increase the risk of problems during pregnancy. These include low weight gain, anemia, infections, and bleeding. Abuse may also increase your baby's risk of low birth weight, premature birth, and death.  It can be hard for some victims of abuse to ask for help or to leave their relationship. You may feel scared, stuck, or not sure what steps to take. But it's important not to ignore abuse. Talking to someone you trust could be the first step to ending the abuse and taking care of your own health and happiness again. There are resources available that can help keep you safe.  Where can you get help?  Talk to a trusted friend. Find a local advocacy group, or talk to your doctor about the abuse.  Contact the National Domestic Violence Hotline at 8-141-584-RAZW (1-756.442.7168) for more safety tips. They can guide you to groups in your area that can help. Or go to the National Coalition Against Domestic Violence  "website at www.GoNetYourself.Youxigu to learn more.  Domestic violence groups or a counselor in your area can help you make a safety plan for yourself and your children.  When to call for help  Call 911 anytime you think you may need emergency care. For example, call if:  You think that you or someone you know is in danger of being abused.  You have been hurt and can't have someone safely take you to emergency care.  You have just been abused.  A family member has just been abused.  Where can you learn more?  Go to https://www.ChargeBee.net/patiented  Enter S665 in the search box to learn more about \"Learning About Intimate Partner Violence.\"  Current as of: June 25, 2023               Content Version: 13.8    2494-9246 [employer]   Care instructions adapted under license by your healthcare professional. If you have questions about a medical condition or this instruction, always ask your healthcare professional. [employer] disclaims any warranty or liability for your use of this information.      Vaginal Bleeding During Pregnancy: Care Instructions  Overview     It's common to have some vaginal spotting when you are pregnant. In some cases, the bleeding isn't serious. And there aren't any more problems with the pregnancy.  But sometimes bleeding is a sign of a more serious problem. This is more common if the bleeding is heavy or painful. Examples of more serious problems include miscarriage, an ectopic pregnancy, and a problem with the placenta.  You may have to see your doctor again to be sure everything is okay. You may also need more tests to find the cause of the bleeding.  Home treatment may be all you need. But it depends on what is causing the bleeding. Be sure to tell your doctor if you have any new symptoms or if your symptoms get worse.  The doctor has checked you carefully, but problems can develop later. If you notice any problems or new symptoms, get medical treatment right " away.  Follow-up care is a key part of your treatment and safety. Be sure to make and go to all appointments, and call your doctor if you are having problems. It's also a good idea to know your test results and keep a list of the medicines you take.  How can you care for yourself at home?  If your doctor prescribed medicines, take them exactly as directed. Call your doctor if you think you are having a problem with your medicine.  Do not have vaginal sex until your doctor says it's okay.  Do not put anything in your vagina until your doctor says it's okay.  Ask your doctor about other activities you can or can't do.  Get a lot of rest. Being pregnant can make you tired.  Do not use nonsteroidal anti-inflammatory drugs (NSAIDs), such as ibuprofen (Advil, Motrin), naproxen (Aleve), or aspirin, unless your doctor says it is okay.  When should you call for help?   Call 911 anytime you think you may need emergency care. For example, call if:    You passed out (lost consciousness).     You have severe vaginal bleeding. This means you are soaking through a pad each hour for 2 or more hours.     You have sudden, severe pain in your belly or pelvis.   Call your doctor now or seek immediate medical care if:    You have new or worse vaginal bleeding.     You are dizzy or lightheaded, or you feel like you may faint.     You have pain in your belly, pelvis, or lower back.     You think that you are in labor.     You have a sudden release of fluid from your vagina.     You've been having regular contractions for an hour. This means that you've had at least 8 contractions within 1 hour or at least 4 contractions within 20 minutes, even after you change your position and drink fluids.     You notice that your baby has stopped moving or is moving much less than normal.   Watch closely for changes in your health, and be sure to contact your doctor if you have any problems.  Where can you learn more?  Go to  "https://www.[employer].net/patiented  Enter N829 in the search box to learn more about \"Vaginal Bleeding During Pregnancy: Care Instructions.\"  Current as of: July 11, 2023               Content Version: 13.8    6499-0931 [employer], Robert Applebaum MD.   Care instructions adapted under license by your healthcare professional. If you have questions about a medical condition or this instruction, always ask your healthcare professional. Healthwise, Robert Applebaum MD disclaims any warranty or liability for your use of this information.      "

## 2024-02-13 NOTE — ASSESSMENT & PLAN NOTE
**HR  FOB: Foua NIKIA: Sep 24, 2024  BT Placenta:  Sex:   Genetic: Yes     Tdap:          Flu:           GBS:     Problems  F/U next visit

## 2024-02-15 ENCOUNTER — ANCILLARY PROCEDURE (OUTPATIENT)
Dept: ULTRASOUND IMAGING | Facility: CLINIC | Age: 29
End: 2024-02-15
Payer: COMMERCIAL

## 2024-02-15 ENCOUNTER — OFFICE VISIT (OUTPATIENT)
Dept: MIDWIFE SERVICES | Facility: CLINIC | Age: 29
End: 2024-02-15
Payer: COMMERCIAL

## 2024-02-15 VITALS
BODY MASS INDEX: 32.83 KG/M2 | WEIGHT: 167.2 LBS | HEIGHT: 60 IN | DIASTOLIC BLOOD PRESSURE: 62 MMHG | SYSTOLIC BLOOD PRESSURE: 114 MMHG

## 2024-02-15 DIAGNOSIS — O36.80X0 PREGNANCY WITH INCONCLUSIVE FETAL VIABILITY: ICD-10-CM

## 2024-02-15 DIAGNOSIS — Z87.59 HISTORY OF ECTOPIC PREGNANCY: ICD-10-CM

## 2024-02-15 DIAGNOSIS — O20.9 VAGINAL BLEEDING IN PREGNANCY, FIRST TRIMESTER: ICD-10-CM

## 2024-02-15 DIAGNOSIS — O09.91 HIGH-RISK PREGNANCY IN FIRST TRIMESTER: ICD-10-CM

## 2024-02-15 DIAGNOSIS — O21.9 NAUSEA/VOMITING IN PREGNANCY: ICD-10-CM

## 2024-02-15 PROBLEM — Z34.83 SUPERVISION OF NORMAL INTRAUTERINE PREGNANCY IN MULTIGRAVIDA IN THIRD TRIMESTER: Status: RESOLVED | Noted: 2020-11-16 | Resolved: 2024-02-15

## 2024-02-15 PROBLEM — O09.90 SUPERVISION OF HIGH-RISK PREGNANCY: Status: RESOLVED | Noted: 2024-02-13 | Resolved: 2024-02-15

## 2024-02-15 PROBLEM — D62 ANEMIA DUE TO BLOOD LOSS, ACUTE: Chronic | Status: RESOLVED | Noted: 2021-07-11 | Resolved: 2024-02-15

## 2024-02-15 PROCEDURE — 99214 OFFICE O/P EST MOD 30 MIN: CPT | Performed by: NURSE PRACTITIONER

## 2024-02-15 PROCEDURE — 76817 TRANSVAGINAL US OBSTETRIC: CPT | Performed by: OBSTETRICS & GYNECOLOGY

## 2024-02-15 RX ORDER — ONDANSETRON 2 MG/ML
4 INJECTION INTRAMUSCULAR; INTRAVENOUS ONCE
Status: CANCELLED | OUTPATIENT
Start: 2024-02-16 | End: 2024-02-16

## 2024-02-15 RX ORDER — LANOLIN ALCOHOL/MO/W.PET/CERES
400 CREAM (GRAM) TOPICAL DAILY
Qty: 90 TABLET | Refills: 3 | Status: SHIPPED | OUTPATIENT
Start: 2024-02-15 | End: 2025-02-09

## 2024-02-15 RX ORDER — HEPARIN SODIUM,PORCINE 10 UNIT/ML
5-20 VIAL (ML) INTRAVENOUS DAILY PRN
Status: CANCELLED | OUTPATIENT
Start: 2024-02-16

## 2024-02-15 RX ORDER — MECLIZINE HYDROCHLORIDE 25 MG/1
25 TABLET ORAL 3 TIMES DAILY PRN
Qty: 90 TABLET | Refills: 1 | Status: SHIPPED | OUTPATIENT
Start: 2024-02-15 | End: 2024-04-15

## 2024-02-15 RX ORDER — HEPARIN SODIUM (PORCINE) LOCK FLUSH IV SOLN 100 UNIT/ML 100 UNIT/ML
5 SOLUTION INTRAVENOUS
Status: CANCELLED | OUTPATIENT
Start: 2024-02-16

## 2024-02-15 NOTE — Clinical Note
Please call Pa and schedule viability US and in person follow up visit with any CNM in 2 weeks.  Thanks!  Taina GREEN CNM, [facility]--[phone]

## 2024-02-15 NOTE — LETTER
February 15, 2024      Kd Garcia  [address]  [place] [address]        To Whom It May Concern:    Kd Garcia  was seen on 2/15/2024.  Please excuse her  until 3/8/2024 due to nausea and vomiting in pregnancy.        Sincerely,        PETER Mackey CNM

## 2024-02-15 NOTE — PROGRESS NOTES
"  SUBJECTIVE:                                                   Kd Garcia is a 28 year old who presents to clinic today for the following health issue(s):  Patient presents with:  Follow Up: Viability US follow up       HPI:  Pa presents today for viability US and follow up d/t history of ectopic pregnancy.  She reports some light bleeding which has not resolved.  She also reports \"severe\" nausea, dizziness and fatigue.  She states that she had nausea and vomiting with her last pregnancy that required IV hydration.  She is requesting time off of work d/t her symptoms and inability to go to work.  She works as a RN.    Has not been able to take PNV.    Patient's last menstrual period was 2023.  .    Last PHQ-9 score on record =       2024     1:02 AM   PHQ-9 SCORE   PHQ-9 Total Score MyChart 0   PHQ-9 Total Score 0     Last GAD7 score on record =       2024     1:01 AM   RACHELLE-7 SCORE   Total Score 0 (minimal anxiety)   Total Score 0     Alcohol Score = 0    Problem list and histories reviewed & adjusted, as indicated.  Additional history: as documented.    Patient Active Problem List   Diagnosis    Papanicolaou smear of cervix with atypical squamous cells of undetermined significance (ASC-US)    H/O cold sores    Constipation during pregnancy in third trimester    History of ectopic pregnancy    Nausea/vomiting in pregnancy    Vaginal bleeding in pregnancy, first trimester    High-risk pregnancy in first trimester     Past Surgical History:   Procedure Laterality Date    HC OPEN RX DISTAL RADIUS FX, EXTRA-ARTICULAR      left arm fx    LAPAROSCOPIC SALPINGECTOMY Right 2022    Procedure: SALPINGECTOMY, LAPAROSCOPIC, Evacuation of Pneumoperitoneum;  Surgeon: María Elena Holland MD;  Location: [facility] OR    [place]o TOOTH EXTRACTION        Social History     Tobacco Use    Smoking status: Former     Packs/day: 0     Types: Cigarettes    Smokeless tobacco: Never   Substance Use " Topics    Alcohol use: Not Currently      Problem (# of Occurrences) Relation (Name,Age of Onset)    Unknown/Adopted (1) Mother    Hypertension (1) Father    Cerebrovascular Disease (1) Father    C.A.D. (1) Father (70): MI    Coronary Artery Disease (1) Father           Negative family history of: Diabetes, Hyperlipidemia, Breast Cancer, Colon Cancer, Prostate Cancer, Other Cancer, Depression, Anxiety Disorder, Mental Illness, Substance Abuse, Anesthesia Reaction, Asthma, Osteoporosis, Genetic Disorder, Thyroid Disease, Obesity              Current Outpatient Medications   Medication Sig    folic acid (FOLVITE) 400 MCG tablet Take 1 tablet (400 mcg) by mouth daily for 360 days    meclizine (ANTIVERT) 25 MG tablet Take 1 tablet (25 mg) by mouth 3 times daily as needed for dizziness or nausea    valACYclovir (VALTREX) 1000 mg tablet TAKE 2 TABLETS(2000 MG) BY MOUTH TWICE DAILY     No current facility-administered medications for this visit.     Allergies   Allergen Reactions    Reglan [Metoclopramide] Anxiety       ROS:  12 point review of systems negative other than symptoms noted below or in the HPI.  Constitutional: Fatigue, Loss of Appetite, and dizziness  Gastrointestinal: Nausea    OBJECTIVE:     /62   Ht 1.524 m (5')   Wt 75.8 kg (167 lb 3.2 oz)   LMP 12/19/2023   BMI 32.65 kg/m    Body mass index is 32.65 kg/m .    PHYSICAL EXAM:  Constitutional:  Appearance: Well nourished, well developed alert, in no acute distress     In-Clinic Test Results:  Results for orders placed or performed in visit on 02/15/24 (from the past 24 hour(s))   US OB Transvaginal Only ([id number])    Narrative    Obstetrical Ultrasound Report  OB U/S  < 14 Weeks -  Transvaginal  [facility]  Referring Provider: PETER Esquivel CNM  Sonographer: Fabrice Huff RDMS  Indication:  Viability check  and Size and Dates     Dating (mm/dd/yyyy):   LMP: 12/19/2023            EDC:  09/24/2024            GA by LMP:            8w2d     Current Scan On:  2/15/2024          EDC:  10/10/2024            GA by Current Scan:          6w0d  The calculation of the gestational age by current scan was based on CRL.  Anatomy Scan:  Quezada gestation.  Biometry:  CRL                       0.34 cm                6w0d                      Yolk Sac                              0.3 cm                                                       Fetal heart activity:  Rate and rhythm is within normal limits.  Fetal   heart rate: 117 bpm  Findings: Single viable IUP, rebecca 3.2 x 1.0 x 2.7 cm     Maternal Structures:  Cervix: The cervix appears long and closed.  Right Ovary: Wnl  Left Ovary: Complex cyst 1.8 x 1.6 x 2.2 cm  Technique:Transvaginal Imaging performed  Impression:      Quezada IUP with growth at 6w 0d (+/- 7-10 days) which is not consistent   with menstrual dating, EDC 10/10/2024 by this ultrasound.  Recommend   clinical correlation and NIKIA adjustment if indicated.  A subchorionic hemorrhage measuring 3.2x1x2.7cm was seen.  Lower fetal heart rate appropriate for early gestation.  Given discrepancy between dating, recommend repeat viability ultrasound in   2 weeks.    Mickie Cedeno MD       ASSESSMENT/PLAN:                                                        ICD-10-CM    1. Subchorionic hemorrhage of placenta in first trimester, single or unspecified fetus  O41.8X10 US OB <14 Weeks w Transvaginal Single    O46.8X1       2. Vaginal bleeding in pregnancy, first trimester  O20.9 US OB <14 Weeks w Transvaginal Single      3. Nausea/vomiting in pregnancy  O21.9 meclizine (ANTIVERT) 25 MG tablet      4. High-risk pregnancy in first trimester  O09.91 folic acid (FOLVITE) 400 MCG tablet          COUNSELING:    -Reviewed US results: REBECCA, dating discrepency, FHR, recommendation to repeat in 2 weeks  -reviewed danger signs, signs and symptoms SAB  -reviewed nausea relief measures.  D/t allergy to Reglan, will start with meclizine.  Reports Zofran  worked in the past, will plan to send Zofran if no relief with meclizine.   -IV hydration orders placed at infusion center.   -Letter to be off of work through 3/8 provided  -Rx for meclizine and folic acid sent to preferred pharmacy  -return to clinic 2 weeks for US and follow up visit, or sooner PRN      30 minutes spent by me on the date of the encounter doing chart review, review of test results, interpretation of tests, and patient visit     Taina GREEN CNM, [facility]  [phone]

## 2024-02-16 NOTE — RESULT ENCOUNTER NOTE
US report sent via [employer]. Dating discrepancy by more than 2 weeks. Already has repeat US scheduled with FAYE visit on 2/28    PETER Viramontes CNM

## 2024-02-19 ENCOUNTER — TELEPHONE (OUTPATIENT)
Dept: OBGYN | Facility: CLINIC | Age: 29
End: 2024-02-19
Payer: COMMERCIAL

## 2024-02-19 ENCOUNTER — INFUSION THERAPY VISIT (OUTPATIENT)
Dept: INFUSION THERAPY | Facility: CLINIC | Age: 29
End: 2024-02-19
Attending: NURSE PRACTITIONER
Payer: COMMERCIAL

## 2024-02-19 VITALS
SYSTOLIC BLOOD PRESSURE: 115 MMHG | RESPIRATION RATE: 16 BRPM | OXYGEN SATURATION: 96 % | HEART RATE: 76 BPM | WEIGHT: 162.5 LBS | TEMPERATURE: 98.2 F | BODY MASS INDEX: 31.74 KG/M2 | DIASTOLIC BLOOD PRESSURE: 81 MMHG

## 2024-02-19 DIAGNOSIS — O21.9 NAUSEA/VOMITING IN PREGNANCY: Primary | ICD-10-CM

## 2024-02-19 PROCEDURE — 258N000003 HC RX IP 258 OP 636: Performed by: NURSE PRACTITIONER

## 2024-02-19 PROCEDURE — 99207 PR NO CHARGE LOS: CPT

## 2024-02-19 PROCEDURE — 96374 THER/PROPH/DIAG INJ IV PUSH: CPT

## 2024-02-19 PROCEDURE — 96361 HYDRATE IV INFUSION ADD-ON: CPT

## 2024-02-19 PROCEDURE — 250N000011 HC RX IP 250 OP 636: Performed by: NURSE PRACTITIONER

## 2024-02-19 RX ORDER — HEPARIN SODIUM,PORCINE 10 UNIT/ML
5-20 VIAL (ML) INTRAVENOUS DAILY PRN
Status: CANCELLED | OUTPATIENT
Start: 2024-02-20

## 2024-02-19 RX ORDER — HEPARIN SODIUM (PORCINE) LOCK FLUSH IV SOLN 100 UNIT/ML 100 UNIT/ML
5 SOLUTION INTRAVENOUS
Status: CANCELLED | OUTPATIENT
Start: 2024-02-20

## 2024-02-19 RX ORDER — ONDANSETRON 2 MG/ML
4 INJECTION INTRAMUSCULAR; INTRAVENOUS ONCE
Status: COMPLETED | OUTPATIENT
Start: 2024-02-19 | End: 2024-02-19

## 2024-02-19 RX ORDER — ONDANSETRON 2 MG/ML
4 INJECTION INTRAMUSCULAR; INTRAVENOUS ONCE
Status: CANCELLED | OUTPATIENT
Start: 2024-02-20 | End: 2024-02-20

## 2024-02-19 RX ADMIN — ONDANSETRON 4 MG: 2 INJECTION INTRAMUSCULAR; INTRAVENOUS at 13:48

## 2024-02-19 RX ADMIN — SODIUM CHLORIDE, POTASSIUM CHLORIDE, SODIUM LACTATE AND CALCIUM CHLORIDE 1000 ML: 600; 310; 30; 20 INJECTION, SOLUTION INTRAVENOUS at 13:45

## 2024-02-19 NOTE — TELEPHONE ENCOUNTER
Type of Paperwork received:  accommodations     Date Rcvd:  na    Rcvd From (Company name): [employer]    Provider:  Midwives    Placed on Provider Cart Date:  Na      Faxed to [employer]   Scanned to Chart

## 2024-02-19 NOTE — PROGRESS NOTES
Infusion Nursing Note:  Kd Garcia presents today for IVF and Zofran.   Patient seen by provider today: No   present during visit today: Not Applicable.    Note: This is the pts first time to [facility]. Orientation provided to infusion center, pt also instructed on how and when to use her call light. Questions answered to pt satisfaction.     Patient is visibly uncomfortable and nauseous. She states she has been having a lot of vomiting lately. See flowsheet for assessment.    Intravenous Access:  Peripheral IV placed.    Treatment Conditions:  Not Applicable.      Post Infusion Assessment:  Patient tolerated infusion without incident.  Blood return noted pre and post infusion.  Site patent and intact, free from redness, edema or discomfort.  No evidence of extravasations.  Access discontinued per protocol.       Discharge Plan:   Patient and/or family verbalized understanding of discharge instructions and all questions answered.  AVS to patient via [employer].  Patient given scheduling phone number to schedule more appts.   Patient discharged in stable condition accompanied by: self.  Departure Mode: Ambulatory.      Cathy Nicole RN

## 2024-02-21 ENCOUNTER — INFUSION THERAPY VISIT (OUTPATIENT)
Dept: INFUSION THERAPY | Facility: CLINIC | Age: 29
End: 2024-02-21
Attending: INTERNAL MEDICINE
Payer: COMMERCIAL

## 2024-02-21 VITALS
HEART RATE: 68 BPM | OXYGEN SATURATION: 97 % | DIASTOLIC BLOOD PRESSURE: 66 MMHG | SYSTOLIC BLOOD PRESSURE: 102 MMHG | TEMPERATURE: 98.7 F | RESPIRATION RATE: 16 BRPM | BODY MASS INDEX: 32.61 KG/M2 | WEIGHT: 167 LBS

## 2024-02-21 DIAGNOSIS — O21.9 NAUSEA/VOMITING IN PREGNANCY: Primary | ICD-10-CM

## 2024-02-21 PROCEDURE — 96374 THER/PROPH/DIAG INJ IV PUSH: CPT

## 2024-02-21 PROCEDURE — 258N000003 HC RX IP 258 OP 636: Performed by: NURSE PRACTITIONER

## 2024-02-21 PROCEDURE — 96361 HYDRATE IV INFUSION ADD-ON: CPT

## 2024-02-21 PROCEDURE — 250N000011 HC RX IP 250 OP 636: Performed by: NURSE PRACTITIONER

## 2024-02-21 PROCEDURE — 99207 PR NO CHARGE LOS: CPT

## 2024-02-21 RX ORDER — ONDANSETRON 2 MG/ML
4 INJECTION INTRAMUSCULAR; INTRAVENOUS ONCE
Status: COMPLETED | OUTPATIENT
Start: 2024-02-21 | End: 2024-02-21

## 2024-02-21 RX ORDER — ONDANSETRON 2 MG/ML
4 INJECTION INTRAMUSCULAR; INTRAVENOUS ONCE
OUTPATIENT
Start: 2024-02-25 | End: 2024-02-22

## 2024-02-21 RX ORDER — HEPARIN SODIUM,PORCINE 10 UNIT/ML
5-20 VIAL (ML) INTRAVENOUS DAILY PRN
OUTPATIENT
Start: 2024-02-22

## 2024-02-21 RX ORDER — HEPARIN SODIUM (PORCINE) LOCK FLUSH IV SOLN 100 UNIT/ML 100 UNIT/ML
5 SOLUTION INTRAVENOUS
OUTPATIENT
Start: 2024-02-22

## 2024-02-21 RX ADMIN — SODIUM CHLORIDE, POTASSIUM CHLORIDE, SODIUM LACTATE AND CALCIUM CHLORIDE 1000 ML: 600; 310; 30; 20 INJECTION, SOLUTION INTRAVENOUS at 13:33

## 2024-02-21 RX ADMIN — ONDANSETRON 4 MG: 2 INJECTION INTRAMUSCULAR; INTRAVENOUS at 13:37

## 2024-02-21 ASSESSMENT — PAIN SCALES - GENERAL: PAINLEVEL: NO PAIN (0)

## 2024-02-21 NOTE — PROGRESS NOTES
Infusion Nursing Note:  Kd Garcia presents today for IVF/ zofran.    Patient seen by provider today: No   present during visit today: Not Applicable.    Note: Pa reports no worsening of symptoms but stable nausea vomiting, and lightheadedness. She reports the IVF on Monday really helped.      Intravenous Access:  Peripheral IV placed.    Treatment Conditions:  See systems review flow sheet and notation above.      Post Infusion Assessment:  Patient tolerated infusion without incident.  Blood return noted pre and post infusion.  Site patent and intact, free from redness, edema or discomfort.  Access discontinued per protocol.       Discharge Plan:   Patient discharged in stable condition accompanied by: self and daughter.  Departure Mode: Ambulatory.  Currently has next appointment on Monday. Working with charge nurse to find her an appointment at an alternate site on Friday 2/24 or Saturday 2/24/24.      Emilee Lopez RN

## 2024-03-01 ENCOUNTER — MYC MEDICAL ADVICE (OUTPATIENT)
Dept: MIDWIFE SERVICES | Facility: CLINIC | Age: 29
End: 2024-03-01
Payer: COMMERCIAL

## 2024-03-01 DIAGNOSIS — O03.9 SAB (SPONTANEOUS ABORTION): Primary | ICD-10-CM

## 2024-03-01 NOTE — TELEPHONE ENCOUNTER
Reviewed chart. Recommend follow-up with HCG in 2 weeks, may be paired with short OV. Recommend earlier evaluation for heavy bleeding, fever, severe abdominal pain, or ongoing cramping. CNMs to fill out paperwork when available.     Maritza Littlejohn, PETER, CNM  [phone]

## 2024-03-04 ENCOUNTER — MYC MEDICAL ADVICE (OUTPATIENT)
Dept: MIDWIFE SERVICES | Facility: CLINIC | Age: 29
End: 2024-03-04
Payer: COMMERCIAL

## 2024-03-04 NOTE — TELEPHONE ENCOUNTER
Type of Paperwork received:  workability      Date Rcvd:  3/4/2024    Rcvd From (Company name): [employer]    Provider:  Midwives    Placed on Provider Cart Date:  3/4/2024

## 2024-04-01 PROBLEM — O41.8X10 OTHER SPECIFIED DISORDERS OF AMNIOTIC FLUID AND MEMBRANES, FIRST TRIMESTER, NOT APPLICABLE OR UNSPECIFIED: Status: ACTIVE | Noted: 2024-02-15

## 2024-08-10 ENCOUNTER — HOSPITAL ENCOUNTER (EMERGENCY)
Facility: CLINIC | Age: 29
Discharge: HOME | End: 2024-08-11
Attending: EMERGENCY MEDICINE | Admitting: EMERGENCY MEDICINE
Payer: COMMERCIAL

## 2024-08-10 ENCOUNTER — APPOINTMENT (OUTPATIENT)
Dept: GENERAL RADIOLOGY | Facility: CLINIC | Age: 29
End: 2024-08-10
Attending: EMERGENCY MEDICINE
Payer: COMMERCIAL

## 2024-08-10 DIAGNOSIS — R07.81 PLEURITIC CHEST PAIN: ICD-10-CM

## 2024-08-10 LAB
ALBUMIN UR-MCNC: NEGATIVE MG/DL
ANION GAP SERPL CALCULATED.3IONS-SCNC: 13 MMOL/L (ref 7–15)
APPEARANCE UR: CLEAR
ATRIAL RATE - MUSE: 101 BPM
BASOPHILS # BLD AUTO: 0.1 10E3/UL (ref 0–0.2)
BASOPHILS NFR BLD AUTO: 1 %
BILIRUB UR QL STRIP: NEGATIVE
BUN SERPL-MCNC: 16.1 MG/DL (ref 6–20)
CALCIUM SERPL-MCNC: 9.4 MG/DL (ref 8.8–10.4)
CHLORIDE SERPL-SCNC: 100 MMOL/L (ref 98–107)
COLOR UR AUTO: ABNORMAL
CREAT SERPL-MCNC: 0.87 MG/DL (ref 0.51–0.95)
DIASTOLIC BLOOD PRESSURE - MUSE: NORMAL MMHG
EGFRCR SERPLBLD CKD-EPI 2021: >90 ML/MIN/1.73M2
EOSINOPHIL # BLD AUTO: 0.2 10E3/UL (ref 0–0.7)
EOSINOPHIL NFR BLD AUTO: 3 %
ERYTHROCYTE [DISTWIDTH] IN BLOOD BY AUTOMATED COUNT: 12.4 % (ref 10–15)
GLUCOSE SERPL-MCNC: 95 MG/DL (ref 70–99)
GLUCOSE UR STRIP-MCNC: NEGATIVE MG/DL
HCG UR QL: NEGATIVE
HCO3 SERPL-SCNC: 24 MMOL/L (ref 22–29)
HCT VFR BLD AUTO: 38.9 % (ref 35–47)
HGB BLD-MCNC: 13.2 G/DL (ref 11.7–15.7)
HGB UR QL STRIP: NEGATIVE
IMM GRANULOCYTES # BLD: 0 10E3/UL
IMM GRANULOCYTES NFR BLD: 0 %
INTERPRETATION ECG - MUSE: NORMAL
KETONES UR STRIP-MCNC: 100 MG/DL
LEUKOCYTE ESTERASE UR QL STRIP: NEGATIVE
LYMPHOCYTES # BLD AUTO: 2.6 10E3/UL (ref 0.8–5.3)
LYMPHOCYTES NFR BLD AUTO: 32 %
MCH RBC QN AUTO: 30.1 PG (ref 26.5–33)
MCHC RBC AUTO-ENTMCNC: 33.9 G/DL (ref 31.5–36.5)
MCV RBC AUTO: 89 FL (ref 78–100)
MONOCYTES # BLD AUTO: 0.7 10E3/UL (ref 0–1.3)
MONOCYTES NFR BLD AUTO: 9 %
NEUTROPHILS # BLD AUTO: 4.4 10E3/UL (ref 1.6–8.3)
NEUTROPHILS NFR BLD AUTO: 56 %
NITRATE UR QL: NEGATIVE
NRBC # BLD AUTO: 0 10E3/UL
NRBC BLD AUTO-RTO: 0 /100
P AXIS - MUSE: 77 DEGREES
PH UR STRIP: 6.5 [PH] (ref 5–7)
PLATELET # BLD AUTO: 299 10E3/UL (ref 150–450)
POTASSIUM SERPL-SCNC: 3.5 MMOL/L (ref 3.4–5.3)
PR INTERVAL - MUSE: 136 MS
QRS DURATION - MUSE: 100 MS
QT - MUSE: 358 MS
QTC - MUSE: 464 MS
R AXIS - MUSE: 48 DEGREES
RBC # BLD AUTO: 4.38 10E6/UL (ref 3.8–5.2)
SODIUM SERPL-SCNC: 137 MMOL/L (ref 135–145)
SP GR UR STRIP: 1.03 (ref 1–1.03)
SYSTOLIC BLOOD PRESSURE - MUSE: NORMAL MMHG
T AXIS - MUSE: 76 DEGREES
TROPONIN T SERPL HS-MCNC: <6 NG/L
UROBILINOGEN UR STRIP-MCNC: NORMAL MG/DL
VENTRICULAR RATE- MUSE: 101 BPM
WBC # BLD AUTO: 8 10E3/UL (ref 4–11)

## 2024-08-10 PROCEDURE — 85379 FIBRIN DEGRADATION QUANT: CPT | Performed by: EMERGENCY MEDICINE

## 2024-08-10 PROCEDURE — 93005 ELECTROCARDIOGRAM TRACING: CPT

## 2024-08-10 PROCEDURE — 84484 ASSAY OF TROPONIN QUANT: CPT | Performed by: EMERGENCY MEDICINE

## 2024-08-10 PROCEDURE — 81003 URINALYSIS AUTO W/O SCOPE: CPT | Performed by: EMERGENCY MEDICINE

## 2024-08-10 PROCEDURE — 80053 COMPREHEN METABOLIC PANEL: CPT | Performed by: EMERGENCY MEDICINE

## 2024-08-10 PROCEDURE — 71046 X-RAY EXAM CHEST 2 VIEWS: CPT

## 2024-08-10 PROCEDURE — 96374 THER/PROPH/DIAG INJ IV PUSH: CPT | Mod: 59

## 2024-08-10 PROCEDURE — 81025 URINE PREGNANCY TEST: CPT | Performed by: EMERGENCY MEDICINE

## 2024-08-10 PROCEDURE — 80048 BASIC METABOLIC PNL TOTAL CA: CPT | Performed by: EMERGENCY MEDICINE

## 2024-08-10 PROCEDURE — 99285 EMERGENCY DEPT VISIT HI MDM: CPT | Mod: 25

## 2024-08-10 PROCEDURE — 36415 COLL VENOUS BLD VENIPUNCTURE: CPT | Performed by: EMERGENCY MEDICINE

## 2024-08-10 PROCEDURE — 85004 AUTOMATED DIFF WBC COUNT: CPT | Performed by: EMERGENCY MEDICINE

## 2024-08-10 ASSESSMENT — ACTIVITIES OF DAILY LIVING (ADL)
ADLS_ACUITY_SCORE: 35
ADLS_ACUITY_SCORE: 35

## 2024-08-11 VITALS
RESPIRATION RATE: 16 BRPM | HEART RATE: 90 BPM | OXYGEN SATURATION: 98 % | SYSTOLIC BLOOD PRESSURE: 109 MMHG | TEMPERATURE: 98.1 F | DIASTOLIC BLOOD PRESSURE: 79 MMHG

## 2024-08-11 LAB
ALBUMIN SERPL BCG-MCNC: 4.6 G/DL (ref 3.5–5.2)
ALP SERPL-CCNC: 71 U/L (ref 40–150)
ALT SERPL W P-5'-P-CCNC: 28 U/L (ref 0–50)
ANION GAP SERPL CALCULATED.3IONS-SCNC: 13 MMOL/L (ref 7–15)
AST SERPL W P-5'-P-CCNC: 33 U/L (ref 0–45)
BILIRUB SERPL-MCNC: 0.4 MG/DL
BUN SERPL-MCNC: 16.1 MG/DL (ref 6–20)
CALCIUM SERPL-MCNC: 9.4 MG/DL (ref 8.8–10.4)
CHLORIDE SERPL-SCNC: 100 MMOL/L (ref 98–107)
CREAT SERPL-MCNC: 0.87 MG/DL (ref 0.51–0.95)
D DIMER PPP FEU-MCNC: <0.27 UG/ML FEU (ref 0–0.5)
EGFRCR SERPLBLD CKD-EPI 2021: >90 ML/MIN/1.73M2
GLUCOSE SERPL-MCNC: 95 MG/DL (ref 70–99)
HCO3 SERPL-SCNC: 24 MMOL/L (ref 22–29)
POTASSIUM SERPL-SCNC: 3.5 MMOL/L (ref 3.4–5.3)
PROT SERPL-MCNC: 7.7 G/DL (ref 6.4–8.3)
SODIUM SERPL-SCNC: 137 MMOL/L (ref 135–145)

## 2024-08-11 PROCEDURE — 250N000011 HC RX IP 250 OP 636: Performed by: EMERGENCY MEDICINE

## 2024-08-11 RX ORDER — KETOROLAC TROMETHAMINE 15 MG/ML
15 INJECTION, SOLUTION INTRAMUSCULAR; INTRAVENOUS ONCE
Status: COMPLETED | OUTPATIENT
Start: 2024-08-11 | End: 2024-08-11

## 2024-08-11 RX ADMIN — KETOROLAC TROMETHAMINE 15 MG: 15 INJECTION, SOLUTION INTRAMUSCULAR; INTRAVENOUS at 00:10

## 2024-08-11 ASSESSMENT — ACTIVITIES OF DAILY LIVING (ADL): ADLS_ACUITY_SCORE: 35

## 2024-08-11 NOTE — ED PROVIDER NOTES
Emergency Department Note      History of Present Illness     Chief Complaint   Chest Pain and Shortness of Breath      HPI   Pa Kev Garcia is a 29 year old female who presents for evaluation of chest pain. Patient reports that at 2020 today she began experiencing chest pain under her right breast along with lightheadedness. She states that laying back and breathing deeply exacerbate this pain. 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 Patient states that she has experienced some similar right sided chest pain when she was pregnant 3 years ago but denies pregnancy today. She denies any recent travel.       Independent Historian   None    Past Medical History     Medical History and Problem List   HSV  Ectopic pregnancy  Anemia due to blood loss  Intrinsic eczema      Medications   Folvite  Valtrex       Surgical History   Laparoscopic salpingectomy  Distal radius fixture, extra-articular       Physical Exam     Patient Vitals for the past 24 hrs:   BP Temp Temp src Pulse Resp SpO2   08/11/24 0010 -- -- -- 83 16 96 %   08/10/24 2323 109/79 -- -- 87 15 98 %   08/10/24 2146 -- -- -- 106 -- --   08/10/24 2145 136/78 98.1  F (36.7  C) Oral -- 16 99 %     Physical Exam  General: Resting on the gurney, appears uncomfortable  Head:  The scalp, face, and head appear normal  Mouth/Throat: Mucus membranes are moist  CV:  Regular rate    Normal S1 and S2  No pathological murmur   Resp:  Breath sounds clear and equal bilaterally    Non-labored, no retractions or accessory muscle use    No coarseness    No wheezing   GI:  Abdomen is soft, no rigidity    No tenderness to palpation  MS:  Normal motor assessment of all extremities.    Good capillary refill noted.  Skin:  No rash or lesions noted.  Neuro:   Speech is normal and fluent. No apparent deficit.  Psych: Awake. Alert.  Normal affect.      Appropriate interactions.      Diagnostics     Lab Results    Labs Ordered and Resulted from Time of ED Arrival to Time of ED Departure   UA MACROSCOPIC WITH REFLEX TO MICRO AND CULTURE - Abnormal       Result Value    Color Urine Light Yellow      Appearance Urine Clear      Glucose Urine Negative      Bilirubin Urine Negative      Ketones Urine 100 (*)     Specific Gravity Urine 1.026      Blood Urine Negative      pH Urine 6.5      Protein Albumin Urine Negative      Urobilinogen Urine Normal      Nitrite Urine Negative      Leukocyte Esterase Urine Negative     BASIC METABOLIC PANEL - Normal    Sodium 137      Potassium 3.5      Chloride 100      Carbon Dioxide (CO2) 24      Anion Gap 13      Urea Nitrogen 16.1      Creatinine 0.87      GFR Estimate >90      Calcium 9.4      Glucose 95     TROPONIN T, HIGH SENSITIVITY - Normal    Troponin T, High Sensitivity <6     D DIMER QUANTITATIVE - Normal    D-Dimer Quantitative <0.27     HCG QUALITATIVE URINE - Normal    hCG Urine Qualitative Negative     COMPREHENSIVE METABOLIC PANEL - Normal    Sodium 137      Potassium 3.5      Carbon Dioxide (CO2) 24      Anion Gap 13      Urea Nitrogen 16.1      Creatinine 0.87      GFR Estimate >90      Calcium 9.4      Chloride 100      Glucose 95      Alkaline Phosphatase 71      AST 33      ALT 28      Protein Total 7.7      Albumin 4.6      Bilirubin Total 0.4     CBC WITH PLATELETS AND DIFFERENTIAL    WBC Count 8.0      RBC Count 4.38      Hemoglobin 13.2      Hematocrit 38.9      MCV 89      MCH 30.1      MCHC 33.9      RDW 12.4      Platelet Count 299      % Neutrophils 56      % Lymphocytes 32      % Monocytes 9      % Eosinophils 3      % Basophils 1      % Immature Granulocytes 0      NRBCs per 100 WBC 0      Absolute Neutrophils 4.4      Absolute Lymphocytes 2.6      Absolute Monocytes 0.7      Absolute Eosinophils 0.2      Absolute Basophils 0.1      Absolute Immature Granulocytes 0.0      Absolute NRBCs 0.0         Imaging   Chest XR,  PA & LAT   Final Result   IMPRESSION: PA  and lateral views of the chest were obtained. Cardiomediastinal silhouette is within normal limits. No suspicious focal pulmonary opacities. No significant pleural effusion or pneumothorax.               EKG   ECG taken at 2144  Sinus tachycardia  Rate 101 bpm. WI interval 136 ms. QRS duration 100 ms. QT/QTc 358/464 ms.         ED Course      Medications Administered   Medications   ketorolac (TORADOL) injection 15 mg (15 mg Intravenous $Given 8/11/24 0010)       Procedures   Procedures     Discussion of Management   None    ED Course   ED Course as of 08/11/24 0107   Sat Aug 10, 2024   2141 I obtained patient history and performed a physical exam.        Additional Documentation  None    Medical Decision Making / Diagnosis     MDM   Pa Kev Garcia is a 29 year old female who presents to the emergency department complaining of pleuritic chest pain.  D-dimer was checked and was negative.  Troponin negative as well.  Her pain significantly improved with Toradol.  Her symptoms are most likely due to pleurisy though costochondritis/intercostal muscle strain is also possible.  I recommended she take ibuprofen 3 times a day for the next 2 to 3 days with food should her pain persist.  She is comfortable with the plan for discharge and will follow-up with her primary care doctor.    Disposition   The patient was discharged.     Diagnosis     ICD-10-CM    1. Pleuritic chest pain  R07.81              Scribe Disclosure:  I, Soo Rubin, am serving as a scribe at 11:01 PM on 8/10/2024 to document services personally performed by Lorrie Murrell MD based on my observations and the provider's statements to me.        Lorrie Murrell MD  08/11/24 0604

## 2024-08-11 NOTE — ED NOTES
Patient comes in for right sided chest pain that awoke her from sleep around 2030. She states it is worse with inspiration and radiates through to her back. Slightly SOB as well. Denies any cough or fever.

## 2024-08-17 ENCOUNTER — HEALTH MAINTENANCE LETTER (OUTPATIENT)
Age: 29
End: 2024-08-17

## 2025-07-27 DIAGNOSIS — B00.1 RECURRENT COLD SORES: ICD-10-CM

## 2025-07-28 RX ORDER — VALACYCLOVIR HYDROCHLORIDE 1 G/1
2000 TABLET, FILM COATED ORAL 2 TIMES DAILY
Qty: 360 TABLET | Refills: 3 | Status: SHIPPED | OUTPATIENT
Start: 2025-07-28

## (undated) DEVICE — TROCAR ADV FIXATION NONBLADED 5X100MM

## (undated) DEVICE — APPLICATOR ENDOSCOPIC 5 SURGICEL POWDER 3123SPEA

## (undated) DEVICE — SOL WATER IRRIG 1000ML BOTTLE 2F7114

## (undated) DEVICE — SUCTION MANIFOLD NEPTUNE 2 SYS 1 PORT 702-025-000

## (undated) DEVICE — NEEDLE SPINAL DISP 22X4-3/4 QUIN 333315

## (undated) DEVICE — GLOVE UNDER INDICATOR PI SZ 6 LF 41660

## (undated) DEVICE — SYRINGE 10ML FILL SALINE FLUSH STERILE 306553

## (undated) DEVICE — CATH FOLEY 5CC 16FR SIL/LTX 0165V16S

## (undated) DEVICE — BRIEF STRETCH XL MPS40

## (undated) DEVICE — PAD POS XL 1X20X40IN PINK PIGAZZI

## (undated) DEVICE — ENDO TROCAR SLEEVE KII ADV FIXATION 05X100MM CFS02

## (undated) DEVICE — TUBING LAP SUCT/IRRIG STRYKER 250070500

## (undated) DEVICE — TUBING SMOKE EVAC PNEUMOCLEAR HIGH FLOW 0620050250

## (undated) DEVICE — Device

## (undated) DEVICE — ENDO SHEARS RENEW LAP ENDOCUT SCISSOR TIP 16.5MM 3142

## (undated) DEVICE — ADH SKIN CLOSURE PREMIERPRO EXOFIN 1.0ML 3470

## (undated) DEVICE — GLOVE BIOGEL PI ULTRATOUCH G SZ 6.0 42160

## (undated) DEVICE — CUSTOM PACK PELVISCOPY SMA5BPVHEA

## (undated) DEVICE — SYR 10ML FINGER CONTROL W/O NDL 309695

## (undated) DEVICE — PREP SCRUB SOL EXIDINE 4% CHG 4OZ 29002-404

## (undated) DEVICE — PREP CHLORAPREP 26ML TINTED HI-LITE ORANGE 930815

## (undated) DEVICE — ESU HOLDER LAP INST DISP PURPLE LONG 330MM H-PRO-330

## (undated) RX ORDER — BUPIVACAINE HYDROCHLORIDE 2.5 MG/ML
INJECTION, SOLUTION EPIDURAL; INFILTRATION; INTRACAUDAL
Status: DISPENSED
Start: 2022-06-06

## (undated) RX ORDER — PROPOFOL 10 MG/ML
INJECTION, EMULSION INTRAVENOUS
Status: DISPENSED
Start: 2022-06-06

## (undated) RX ORDER — ONDANSETRON 2 MG/ML
INJECTION INTRAMUSCULAR; INTRAVENOUS
Status: DISPENSED
Start: 2022-06-06

## (undated) RX ORDER — DEXAMETHASONE SODIUM PHOSPHATE 10 MG/ML
INJECTION INTRAMUSCULAR; INTRAVENOUS
Status: DISPENSED
Start: 2022-06-06

## (undated) RX ORDER — FENTANYL CITRATE 50 UG/ML
INJECTION, SOLUTION INTRAMUSCULAR; INTRAVENOUS
Status: DISPENSED
Start: 2022-06-06